# Patient Record
Sex: FEMALE | Race: WHITE | NOT HISPANIC OR LATINO | Employment: FULL TIME | URBAN - METROPOLITAN AREA
[De-identification: names, ages, dates, MRNs, and addresses within clinical notes are randomized per-mention and may not be internally consistent; named-entity substitution may affect disease eponyms.]

---

## 2017-03-20 PROBLEM — D25.9 UTERINE LEIOMYOMA: Status: ACTIVE | Noted: 2017-03-20

## 2017-03-23 ENCOUNTER — ANESTHESIA EVENT (OUTPATIENT)
Dept: PERIOP | Facility: HOSPITAL | Age: 37
End: 2017-03-23
Payer: COMMERCIAL

## 2017-03-24 ENCOUNTER — ANESTHESIA (OUTPATIENT)
Dept: PERIOP | Facility: HOSPITAL | Age: 37
End: 2017-03-24
Payer: COMMERCIAL

## 2017-03-24 ENCOUNTER — HOSPITAL ENCOUNTER (OUTPATIENT)
Facility: HOSPITAL | Age: 37
Setting detail: OUTPATIENT SURGERY
Discharge: HOME | End: 2017-03-24
Attending: OBSTETRICS & GYNECOLOGY | Admitting: OBSTETRICS & GYNECOLOGY
Payer: COMMERCIAL

## 2017-03-24 VITALS
BODY MASS INDEX: 21.66 KG/M2 | HEIGHT: 65 IN | HEART RATE: 97 BPM | OXYGEN SATURATION: 100 % | WEIGHT: 130 LBS | DIASTOLIC BLOOD PRESSURE: 63 MMHG | TEMPERATURE: 99.9 F | RESPIRATION RATE: 16 BRPM | SYSTOLIC BLOOD PRESSURE: 106 MMHG

## 2017-03-24 DIAGNOSIS — N92.1 EXCESSIVE AND FREQUENT MENSTRUATION WITH IRREGULAR CYCLE: ICD-10-CM

## 2017-03-24 DIAGNOSIS — D25.9 FIBROID UTERUS: ICD-10-CM

## 2017-03-24 LAB
ABO GROUP BLD: NORMAL
BASOPHILS # BLD AUTO: 0.02 THOUSANDS/ΜL (ref 0–0.1)
BASOPHILS NFR BLD AUTO: 1 % (ref 0–1)
BLD GP AB SCN SERPL QL: NEGATIVE
EOSINOPHIL # BLD AUTO: 0.06 THOUSAND/ΜL (ref 0–0.61)
EOSINOPHIL NFR BLD AUTO: 2 % (ref 0–6)
ERYTHROCYTE [DISTWIDTH] IN BLOOD BY AUTOMATED COUNT: 16.5 % (ref 11.6–15.1)
EXT PREGNANCY TEST URINE: NEGATIVE
GLUCOSE SERPL-MCNC: 128 MG/DL (ref 65–140)
HCT VFR BLD AUTO: 25.3 % (ref 34.8–46.1)
HGB BLD-MCNC: 7.3 G/DL (ref 11.5–15.4)
LYMPHOCYTES # BLD AUTO: 1.61 THOUSANDS/ΜL (ref 0.6–4.47)
LYMPHOCYTES NFR BLD AUTO: 42 % (ref 14–44)
MCH RBC QN AUTO: 21 PG (ref 26.8–34.3)
MCHC RBC AUTO-ENTMCNC: 28.9 G/DL (ref 31.4–37.4)
MCV RBC AUTO: 73 FL (ref 82–98)
MONOCYTES # BLD AUTO: 0.28 THOUSAND/ΜL (ref 0.17–1.22)
MONOCYTES NFR BLD AUTO: 7 % (ref 4–12)
NEUTROPHILS # BLD AUTO: 1.86 THOUSANDS/ΜL (ref 1.85–7.62)
NEUTS SEG NFR BLD AUTO: 48 % (ref 43–75)
NRBC BLD AUTO-RTO: 0 /100 WBCS
PLATELET # BLD AUTO: 211 THOUSANDS/UL (ref 149–390)
PMV BLD AUTO: 10.9 FL (ref 8.9–12.7)
RBC # BLD AUTO: 3.48 MILLION/UL (ref 3.81–5.12)
RH BLD: POSITIVE
WBC # BLD AUTO: 3.83 THOUSAND/UL (ref 4.31–10.16)

## 2017-03-24 PROCEDURE — 81025 URINE PREGNANCY TEST: CPT | Performed by: OBSTETRICS & GYNECOLOGY

## 2017-03-24 PROCEDURE — 82948 REAGENT STRIP/BLOOD GLUCOSE: CPT

## 2017-03-24 PROCEDURE — 86850 RBC ANTIBODY SCREEN: CPT | Performed by: OBSTETRICS & GYNECOLOGY

## 2017-03-24 PROCEDURE — 88307 TISSUE EXAM BY PATHOLOGIST: CPT | Performed by: OBSTETRICS & GYNECOLOGY

## 2017-03-24 PROCEDURE — 86901 BLOOD TYPING SEROLOGIC RH(D): CPT | Performed by: OBSTETRICS & GYNECOLOGY

## 2017-03-24 PROCEDURE — 86900 BLOOD TYPING SEROLOGIC ABO: CPT | Performed by: OBSTETRICS & GYNECOLOGY

## 2017-03-24 PROCEDURE — 85025 COMPLETE CBC W/AUTO DIFF WBC: CPT | Performed by: OBSTETRICS & GYNECOLOGY

## 2017-03-24 RX ORDER — FENTANYL CITRATE 50 UG/ML
INJECTION, SOLUTION INTRAMUSCULAR; INTRAVENOUS AS NEEDED
Status: DISCONTINUED | OUTPATIENT
Start: 2017-03-24 | End: 2017-03-24 | Stop reason: SURG

## 2017-03-24 RX ORDER — ONDANSETRON 2 MG/ML
INJECTION INTRAMUSCULAR; INTRAVENOUS AS NEEDED
Status: DISCONTINUED | OUTPATIENT
Start: 2017-03-24 | End: 2017-03-24 | Stop reason: SURG

## 2017-03-24 RX ORDER — FENTANYL CITRATE/PF 50 MCG/ML
25 SYRINGE (ML) INJECTION
Status: DISCONTINUED | OUTPATIENT
Start: 2017-03-24 | End: 2017-03-24 | Stop reason: HOSPADM

## 2017-03-24 RX ORDER — PROMETHAZINE HYDROCHLORIDE 25 MG/ML
12.5 INJECTION, SOLUTION INTRAMUSCULAR; INTRAVENOUS ONCE
Status: COMPLETED | OUTPATIENT
Start: 2017-03-24 | End: 2017-03-24

## 2017-03-24 RX ORDER — OXYCODONE HYDROCHLORIDE AND ACETAMINOPHEN 5; 325 MG/1; MG/1
TABLET ORAL
Qty: 28 TABLET | Refills: 0 | Status: SHIPPED | OUTPATIENT
Start: 2017-03-24 | End: 2017-10-29

## 2017-03-24 RX ORDER — IBUPROFEN 600 MG/1
600 TABLET ORAL EVERY 6 HOURS PRN
Status: DISCONTINUED | OUTPATIENT
Start: 2017-03-24 | End: 2017-03-24 | Stop reason: HOSPADM

## 2017-03-24 RX ORDER — PROPOFOL 10 MG/ML
INJECTION, EMULSION INTRAVENOUS AS NEEDED
Status: DISCONTINUED | OUTPATIENT
Start: 2017-03-24 | End: 2017-03-24 | Stop reason: SURG

## 2017-03-24 RX ORDER — ONDANSETRON 2 MG/ML
4 INJECTION INTRAMUSCULAR; INTRAVENOUS ONCE
Status: DISCONTINUED | OUTPATIENT
Start: 2017-03-24 | End: 2017-03-24 | Stop reason: HOSPADM

## 2017-03-24 RX ORDER — LIDOCAINE HYDROCHLORIDE 10 MG/ML
INJECTION, SOLUTION INFILTRATION; PERINEURAL AS NEEDED
Status: DISCONTINUED | OUTPATIENT
Start: 2017-03-24 | End: 2017-03-24 | Stop reason: SURG

## 2017-03-24 RX ORDER — KETOROLAC TROMETHAMINE 30 MG/ML
INJECTION, SOLUTION INTRAMUSCULAR; INTRAVENOUS AS NEEDED
Status: DISCONTINUED | OUTPATIENT
Start: 2017-03-24 | End: 2017-03-24 | Stop reason: SURG

## 2017-03-24 RX ORDER — OXYCODONE HYDROCHLORIDE AND ACETAMINOPHEN 5; 325 MG/1; MG/1
2 TABLET ORAL EVERY 4 HOURS PRN
Status: DISCONTINUED | OUTPATIENT
Start: 2017-03-24 | End: 2017-03-24 | Stop reason: HOSPADM

## 2017-03-24 RX ORDER — ROCURONIUM BROMIDE 10 MG/ML
INJECTION, SOLUTION INTRAVENOUS AS NEEDED
Status: DISCONTINUED | OUTPATIENT
Start: 2017-03-24 | End: 2017-03-24 | Stop reason: SURG

## 2017-03-24 RX ORDER — OXYCODONE HYDROCHLORIDE AND ACETAMINOPHEN 5; 325 MG/1; MG/1
1 TABLET ORAL EVERY 4 HOURS PRN
Status: DISCONTINUED | OUTPATIENT
Start: 2017-03-24 | End: 2017-03-24 | Stop reason: HOSPADM

## 2017-03-24 RX ORDER — SODIUM CHLORIDE, SODIUM LACTATE, POTASSIUM CHLORIDE, CALCIUM CHLORIDE 600; 310; 30; 20 MG/100ML; MG/100ML; MG/100ML; MG/100ML
100 INJECTION, SOLUTION INTRAVENOUS CONTINUOUS
Status: DISCONTINUED | OUTPATIENT
Start: 2017-03-24 | End: 2017-03-24 | Stop reason: HOSPADM

## 2017-03-24 RX ORDER — GLYCOPYRROLATE 0.2 MG/ML
INJECTION INTRAMUSCULAR; INTRAVENOUS AS NEEDED
Status: DISCONTINUED | OUTPATIENT
Start: 2017-03-24 | End: 2017-03-24 | Stop reason: SURG

## 2017-03-24 RX ORDER — METOCLOPRAMIDE HYDROCHLORIDE 5 MG/ML
INJECTION INTRAMUSCULAR; INTRAVENOUS AS NEEDED
Status: DISCONTINUED | OUTPATIENT
Start: 2017-03-24 | End: 2017-03-24 | Stop reason: SURG

## 2017-03-24 RX ORDER — SODIUM CHLORIDE, SODIUM LACTATE, POTASSIUM CHLORIDE, CALCIUM CHLORIDE 600; 310; 30; 20 MG/100ML; MG/100ML; MG/100ML; MG/100ML
125 INJECTION, SOLUTION INTRAVENOUS CONTINUOUS
Status: DISCONTINUED | OUTPATIENT
Start: 2017-03-24 | End: 2017-03-24 | Stop reason: HOSPADM

## 2017-03-24 RX ORDER — ONDANSETRON 2 MG/ML
4 INJECTION INTRAMUSCULAR; INTRAVENOUS EVERY 6 HOURS PRN
Status: DISCONTINUED | OUTPATIENT
Start: 2017-03-24 | End: 2017-03-24 | Stop reason: HOSPADM

## 2017-03-24 RX ORDER — MAGNESIUM HYDROXIDE 1200 MG/15ML
LIQUID ORAL AS NEEDED
Status: DISCONTINUED | OUTPATIENT
Start: 2017-03-24 | End: 2017-03-24 | Stop reason: HOSPADM

## 2017-03-24 RX ORDER — BUPIVACAINE HYDROCHLORIDE 2.5 MG/ML
INJECTION, SOLUTION INFILTRATION; PERINEURAL AS NEEDED
Status: DISCONTINUED | OUTPATIENT
Start: 2017-03-24 | End: 2017-03-24 | Stop reason: HOSPADM

## 2017-03-24 RX ADMIN — PROPOFOL 130 MG: 10 INJECTION, EMULSION INTRAVENOUS at 12:23

## 2017-03-24 RX ADMIN — METOCLOPRAMIDE HYDROCHLORIDE 10 MG: 5 INJECTION INTRAMUSCULAR; INTRAVENOUS at 14:50

## 2017-03-24 RX ADMIN — CEFAZOLIN SODIUM 1000 MG: 1 SOLUTION INTRAVENOUS at 12:34

## 2017-03-24 RX ADMIN — ROCURONIUM BROMIDE 40 MG: 10 INJECTION, SOLUTION INTRAVENOUS at 12:24

## 2017-03-24 RX ADMIN — DEXAMETHASONE SODIUM PHOSPHATE 10 MG: 10 INJECTION INTRAMUSCULAR; INTRAVENOUS at 12:47

## 2017-03-24 RX ADMIN — IBUPROFEN 600 MG: 600 TABLET, FILM COATED ORAL at 18:26

## 2017-03-24 RX ADMIN — PROMETHAZINE HYDROCHLORIDE 12.5 MG: 25 INJECTION, SOLUTION INTRAMUSCULAR; INTRAVENOUS at 15:26

## 2017-03-24 RX ADMIN — LIDOCAINE HYDROCHLORIDE 50 MG: 10 INJECTION, SOLUTION INFILTRATION; PERINEURAL at 12:22

## 2017-03-24 RX ADMIN — PROPOFOL 30 MG: 10 INJECTION, EMULSION INTRAVENOUS at 13:47

## 2017-03-24 RX ADMIN — FENTANYL CITRATE 50 MCG: 50 INJECTION, SOLUTION INTRAMUSCULAR; INTRAVENOUS at 13:47

## 2017-03-24 RX ADMIN — KETOROLAC TROMETHAMINE 30 MG: 30 INJECTION, SOLUTION INTRAMUSCULAR at 15:06

## 2017-03-24 RX ADMIN — SODIUM CHLORIDE, SODIUM LACTATE, POTASSIUM CHLORIDE, AND CALCIUM CHLORIDE: .6; .31; .03; .02 INJECTION, SOLUTION INTRAVENOUS at 14:24

## 2017-03-24 RX ADMIN — FENTANYL CITRATE 50 MCG: 50 INJECTION, SOLUTION INTRAMUSCULAR; INTRAVENOUS at 14:19

## 2017-03-24 RX ADMIN — ONDANSETRON 4 MG: 2 INJECTION INTRAMUSCULAR; INTRAVENOUS at 15:01

## 2017-03-24 RX ADMIN — NEOSTIGMINE METHYLSULFATE 2.5 MG: 1 INJECTION INTRAMUSCULAR; INTRAVENOUS; SUBCUTANEOUS at 15:08

## 2017-03-24 RX ADMIN — FENTANYL CITRATE 50 MCG: 50 INJECTION, SOLUTION INTRAMUSCULAR; INTRAVENOUS at 12:22

## 2017-03-24 RX ADMIN — OXYCODONE HYDROCHLORIDE AND ACETAMINOPHEN 1 TABLET: 5; 325 TABLET ORAL at 18:26

## 2017-03-24 RX ADMIN — SODIUM CHLORIDE, SODIUM LACTATE, POTASSIUM CHLORIDE, AND CALCIUM CHLORIDE 100 ML/HR: .6; .31; .03; .02 INJECTION, SOLUTION INTRAVENOUS at 11:58

## 2017-03-24 RX ADMIN — GLYCOPYRROLATE 0.5 MG: 0.2 INJECTION INTRAMUSCULAR; INTRAVENOUS at 15:08

## 2017-03-27 ENCOUNTER — ANESTHESIA (OUTPATIENT)
Dept: PERIOP | Facility: HOSPITAL | Age: 37
DRG: 694 | End: 2017-03-27
Payer: COMMERCIAL

## 2017-03-27 ENCOUNTER — APPOINTMENT (EMERGENCY)
Dept: RADIOLOGY | Facility: HOSPITAL | Age: 37
DRG: 694 | End: 2017-03-27
Payer: COMMERCIAL

## 2017-03-27 ENCOUNTER — APPOINTMENT (OUTPATIENT)
Dept: RADIOLOGY | Facility: HOSPITAL | Age: 37
DRG: 694 | End: 2017-03-27
Payer: COMMERCIAL

## 2017-03-27 ENCOUNTER — HOSPITAL ENCOUNTER (INPATIENT)
Facility: HOSPITAL | Age: 37
LOS: 1 days | Discharge: HOME | DRG: 694 | End: 2017-03-28
Attending: OBSTETRICS & GYNECOLOGY | Admitting: OBSTETRICS & GYNECOLOGY
Payer: COMMERCIAL

## 2017-03-27 ENCOUNTER — ANESTHESIA EVENT (OUTPATIENT)
Dept: PERIOP | Facility: HOSPITAL | Age: 37
DRG: 694 | End: 2017-03-27
Payer: COMMERCIAL

## 2017-03-27 DIAGNOSIS — N13.30 HYDRONEPHROSIS OF RIGHT KIDNEY: Primary | ICD-10-CM

## 2017-03-27 LAB
ABO GROUP BLD BPU: NORMAL
ABO GROUP BLD BPU: NORMAL
ABO GROUP BLD: NORMAL
ANION GAP SERPL CALCULATED.3IONS-SCNC: 8 MMOL/L (ref 4–13)
BASOPHILS # BLD AUTO: 0.02 THOUSANDS/ΜL (ref 0–0.1)
BASOPHILS NFR BLD AUTO: 0 % (ref 0–1)
BILIRUB UR QL STRIP: NEGATIVE
BLD GP AB SCN SERPL QL: NEGATIVE
BPU ID: NORMAL
BPU ID: NORMAL
BUN SERPL-MCNC: 9 MG/DL (ref 5–25)
CALCIUM SERPL-MCNC: 8.8 MG/DL (ref 8.3–10.1)
CHLORIDE SERPL-SCNC: 107 MMOL/L (ref 100–108)
CLARITY UR: CLEAR
CO2 SERPL-SCNC: 26 MMOL/L (ref 21–32)
COLOR UR: YELLOW
CREAT SERPL-MCNC: 1.36 MG/DL (ref 0.6–1.3)
EOSINOPHIL # BLD AUTO: 0.08 THOUSAND/ΜL (ref 0–0.61)
EOSINOPHIL NFR BLD AUTO: 1 % (ref 0–6)
ERYTHROCYTE [DISTWIDTH] IN BLOOD BY AUTOMATED COUNT: 16.8 % (ref 11.6–15.1)
GFR SERPL CREATININE-BSD FRML MDRD: 43.5 ML/MIN/1.73SQ M
GLUCOSE SERPL-MCNC: 90 MG/DL (ref 65–140)
GLUCOSE UR STRIP-MCNC: NEGATIVE MG/DL
HCT VFR BLD AUTO: 21.6 % (ref 34.8–46.1)
HGB BLD-MCNC: 6.2 G/DL (ref 11.5–15.4)
HGB UR QL STRIP.AUTO: NEGATIVE
KETONES UR STRIP-MCNC: NEGATIVE MG/DL
LEUKOCYTE ESTERASE UR QL STRIP: NEGATIVE
LYMPHOCYTES # BLD AUTO: 1.82 THOUSANDS/ΜL (ref 0.6–4.47)
LYMPHOCYTES NFR BLD AUTO: 22 % (ref 14–44)
MCH RBC QN AUTO: 20.3 PG (ref 26.8–34.3)
MCHC RBC AUTO-ENTMCNC: 28.7 G/DL (ref 31.4–37.4)
MCV RBC AUTO: 71 FL (ref 82–98)
MONOCYTES # BLD AUTO: 0.63 THOUSAND/ΜL (ref 0.17–1.22)
MONOCYTES NFR BLD AUTO: 8 % (ref 4–12)
NEUTROPHILS # BLD AUTO: 5.63 THOUSANDS/ΜL (ref 1.85–7.62)
NEUTS SEG NFR BLD AUTO: 69 % (ref 43–75)
NITRITE UR QL STRIP: NEGATIVE
PH UR STRIP.AUTO: 7.5 [PH] (ref 4.5–8)
PLATELET # BLD AUTO: 194 THOUSANDS/UL (ref 149–390)
PMV BLD AUTO: 11 FL (ref 8.9–12.7)
POTASSIUM SERPL-SCNC: 3.9 MMOL/L (ref 3.5–5.3)
PROT UR STRIP-MCNC: NEGATIVE MG/DL
RBC # BLD AUTO: 3.05 MILLION/UL (ref 3.81–5.12)
RH BLD: POSITIVE
SODIUM SERPL-SCNC: 141 MMOL/L (ref 136–145)
SP GR UR STRIP.AUTO: 1 (ref 1–1.03)
UNIT DISPENSE STATUS: NORMAL
UNIT DISPENSE STATUS: NORMAL
UNIT PRODUCT CODE: NORMAL
UNIT PRODUCT CODE: NORMAL
UNIT RH: NORMAL
UNIT RH: NORMAL
UROBILINOGEN UR QL STRIP.AUTO: 0.2 E.U./DL
WBC # BLD AUTO: 8.22 THOUSAND/UL (ref 4.31–10.16)

## 2017-03-27 PROCEDURE — P9016 RBC LEUKOCYTES REDUCED: HCPCS

## 2017-03-27 PROCEDURE — P9021 RED BLOOD CELLS UNIT: HCPCS

## 2017-03-27 PROCEDURE — 74176 CT ABD & PELVIS W/O CONTRAST: CPT

## 2017-03-27 PROCEDURE — 86850 RBC ANTIBODY SCREEN: CPT | Performed by: OBSTETRICS & GYNECOLOGY

## 2017-03-27 PROCEDURE — 85025 COMPLETE CBC W/AUTO DIFF WBC: CPT | Performed by: OBSTETRICS & GYNECOLOGY

## 2017-03-27 PROCEDURE — C1769 GUIDE WIRE: HCPCS | Performed by: UROLOGY

## 2017-03-27 PROCEDURE — 86901 BLOOD TYPING SEROLOGIC RH(D): CPT | Performed by: OBSTETRICS & GYNECOLOGY

## 2017-03-27 PROCEDURE — 86923 COMPATIBILITY TEST ELECTRIC: CPT

## 2017-03-27 PROCEDURE — 99284 EMERGENCY DEPT VISIT MOD MDM: CPT

## 2017-03-27 PROCEDURE — 74420 UROGRAPHY RTRGR +-KUB: CPT

## 2017-03-27 PROCEDURE — 36430 TRANSFUSION BLD/BLD COMPNT: CPT

## 2017-03-27 PROCEDURE — 36415 COLL VENOUS BLD VENIPUNCTURE: CPT | Performed by: OBSTETRICS & GYNECOLOGY

## 2017-03-27 PROCEDURE — 81003 URINALYSIS AUTO W/O SCOPE: CPT | Performed by: OBSTETRICS & GYNECOLOGY

## 2017-03-27 PROCEDURE — 86900 BLOOD TYPING SEROLOGIC ABO: CPT | Performed by: OBSTETRICS & GYNECOLOGY

## 2017-03-27 PROCEDURE — 0T768DZ DILATION OF RIGHT URETER WITH INTRALUMINAL DEVICE, VIA NATURAL OR ARTIFICIAL OPENING ENDOSCOPIC: ICD-10-PCS | Performed by: UROLOGY

## 2017-03-27 PROCEDURE — 80048 BASIC METABOLIC PNL TOTAL CA: CPT | Performed by: OBSTETRICS & GYNECOLOGY

## 2017-03-27 PROCEDURE — C2617 STENT, NON-COR, TEM W/O DEL: HCPCS | Performed by: UROLOGY

## 2017-03-27 PROCEDURE — BT140ZZ FLUOROSCOPY OF KIDNEYS, URETERS AND BLADDER USING HIGH OSMOLAR CONTRAST: ICD-10-PCS | Performed by: UROLOGY

## 2017-03-27 PROCEDURE — 30233N1 TRANSFUSION OF NONAUTOLOGOUS RED BLOOD CELLS INTO PERIPHERAL VEIN, PERCUTANEOUS APPROACH: ICD-10-PCS | Performed by: OBSTETRICS & GYNECOLOGY

## 2017-03-27 DEVICE — STENT URETERAL 6 FR 26CM INLAY OPTIMA: Type: IMPLANTABLE DEVICE | Site: URETER | Status: FUNCTIONAL

## 2017-03-27 RX ORDER — DIPHENHYDRAMINE HYDROCHLORIDE 50 MG/ML
25 INJECTION INTRAMUSCULAR; INTRAVENOUS ONCE
Status: COMPLETED | OUTPATIENT
Start: 2017-03-27 | End: 2017-03-27

## 2017-03-27 RX ORDER — SODIUM CHLORIDE, SODIUM LACTATE, POTASSIUM CHLORIDE, CALCIUM CHLORIDE 600; 310; 30; 20 MG/100ML; MG/100ML; MG/100ML; MG/100ML
100 INJECTION, SOLUTION INTRAVENOUS CONTINUOUS
Status: DISCONTINUED | OUTPATIENT
Start: 2017-03-27 | End: 2017-03-28

## 2017-03-27 RX ORDER — SODIUM CHLORIDE 9 MG/ML
125 INJECTION, SOLUTION INTRAVENOUS CONTINUOUS
Status: DISCONTINUED | OUTPATIENT
Start: 2017-03-27 | End: 2017-03-27

## 2017-03-27 RX ORDER — DOCUSATE SODIUM 100 MG/1
100 CAPSULE, LIQUID FILLED ORAL 2 TIMES DAILY
Status: DISCONTINUED | OUTPATIENT
Start: 2017-03-27 | End: 2017-03-27 | Stop reason: SDUPTHER

## 2017-03-27 RX ORDER — OXYCODONE HYDROCHLORIDE AND ACETAMINOPHEN 5; 325 MG/1; MG/1
1 TABLET ORAL EVERY 4 HOURS PRN
Status: DISCONTINUED | OUTPATIENT
Start: 2017-03-27 | End: 2017-03-28

## 2017-03-27 RX ORDER — DOCUSATE SODIUM 100 MG/1
100 CAPSULE, LIQUID FILLED ORAL 2 TIMES DAILY
Status: DISCONTINUED | OUTPATIENT
Start: 2017-03-28 | End: 2017-03-28 | Stop reason: HOSPADM

## 2017-03-27 RX ORDER — PROPOFOL 10 MG/ML
INJECTION, EMULSION INTRAVENOUS AS NEEDED
Status: DISCONTINUED | OUTPATIENT
Start: 2017-03-27 | End: 2017-03-27 | Stop reason: SURG

## 2017-03-27 RX ORDER — ROCURONIUM BROMIDE 10 MG/ML
INJECTION, SOLUTION INTRAVENOUS AS NEEDED
Status: DISCONTINUED | OUTPATIENT
Start: 2017-03-27 | End: 2017-03-27 | Stop reason: SURG

## 2017-03-27 RX ORDER — SODIUM CHLORIDE, SODIUM LACTATE, POTASSIUM CHLORIDE, CALCIUM CHLORIDE 600; 310; 30; 20 MG/100ML; MG/100ML; MG/100ML; MG/100ML
INJECTION, SOLUTION INTRAVENOUS CONTINUOUS PRN
Status: DISCONTINUED | OUTPATIENT
Start: 2017-03-27 | End: 2017-03-27 | Stop reason: SURG

## 2017-03-27 RX ORDER — GLYCOPYRROLATE 0.2 MG/ML
INJECTION INTRAMUSCULAR; INTRAVENOUS AS NEEDED
Status: DISCONTINUED | OUTPATIENT
Start: 2017-03-27 | End: 2017-03-27 | Stop reason: SURG

## 2017-03-27 RX ORDER — SUCCINYLCHOLINE CHLORIDE 20 MG/ML
INJECTION INTRAMUSCULAR; INTRAVENOUS AS NEEDED
Status: DISCONTINUED | OUTPATIENT
Start: 2017-03-27 | End: 2017-03-27 | Stop reason: SURG

## 2017-03-27 RX ORDER — SODIUM CHLORIDE 9 MG/ML
INJECTION, SOLUTION INTRAVENOUS CONTINUOUS PRN
Status: DISCONTINUED | OUTPATIENT
Start: 2017-03-27 | End: 2017-03-27 | Stop reason: SURG

## 2017-03-27 RX ORDER — FENTANYL CITRATE 50 UG/ML
INJECTION, SOLUTION INTRAMUSCULAR; INTRAVENOUS AS NEEDED
Status: DISCONTINUED | OUTPATIENT
Start: 2017-03-27 | End: 2017-03-27

## 2017-03-27 RX ORDER — FERROUS SULFATE 325(65) MG
325 TABLET ORAL
Status: DISCONTINUED | OUTPATIENT
Start: 2017-03-28 | End: 2017-03-28 | Stop reason: HOSPADM

## 2017-03-27 RX ORDER — SODIUM CHLORIDE, SODIUM LACTATE, POTASSIUM CHLORIDE, CALCIUM CHLORIDE 600; 310; 30; 20 MG/100ML; MG/100ML; MG/100ML; MG/100ML
50 INJECTION, SOLUTION INTRAVENOUS CONTINUOUS
Status: DISCONTINUED | OUTPATIENT
Start: 2017-03-27 | End: 2017-03-27

## 2017-03-27 RX ORDER — MAGNESIUM HYDROXIDE 1200 MG/15ML
LIQUID ORAL AS NEEDED
Status: DISCONTINUED | OUTPATIENT
Start: 2017-03-27 | End: 2017-03-27 | Stop reason: HOSPADM

## 2017-03-27 RX ORDER — ONDANSETRON 2 MG/ML
INJECTION INTRAMUSCULAR; INTRAVENOUS AS NEEDED
Status: DISCONTINUED | OUTPATIENT
Start: 2017-03-27 | End: 2017-03-27 | Stop reason: SURG

## 2017-03-27 RX ORDER — POLYETHYLENE GLYCOL 3350 17 G/17G
17 POWDER, FOR SOLUTION ORAL DAILY
Status: DISCONTINUED | OUTPATIENT
Start: 2017-03-28 | End: 2017-03-28 | Stop reason: HOSPADM

## 2017-03-27 RX ORDER — FENTANYL CITRATE/PF 50 MCG/ML
50 SYRINGE (ML) INJECTION
Status: DISCONTINUED | OUTPATIENT
Start: 2017-03-27 | End: 2017-03-27 | Stop reason: HOSPADM

## 2017-03-27 RX ORDER — ONDANSETRON 2 MG/ML
4 INJECTION INTRAMUSCULAR; INTRAVENOUS EVERY 4 HOURS PRN
Status: DISCONTINUED | OUTPATIENT
Start: 2017-03-27 | End: 2017-03-28 | Stop reason: HOSPADM

## 2017-03-27 RX ORDER — MIDAZOLAM HYDROCHLORIDE 1 MG/ML
INJECTION INTRAMUSCULAR; INTRAVENOUS AS NEEDED
Status: DISCONTINUED | OUTPATIENT
Start: 2017-03-27 | End: 2017-03-27

## 2017-03-27 RX ORDER — METOCLOPRAMIDE HYDROCHLORIDE 5 MG/ML
INJECTION INTRAMUSCULAR; INTRAVENOUS AS NEEDED
Status: DISCONTINUED | OUTPATIENT
Start: 2017-03-27 | End: 2017-03-27 | Stop reason: SURG

## 2017-03-27 RX ADMIN — NEOSTIGMINE METHYLSULFATE 1 MG: 1 INJECTION INTRAMUSCULAR; INTRAVENOUS; SUBCUTANEOUS at 19:04

## 2017-03-27 RX ADMIN — SUCCINYLCHOLINE CHLORIDE 100 MG: 20 INJECTION, SOLUTION INTRAMUSCULAR; INTRAVENOUS at 18:32

## 2017-03-27 RX ADMIN — SODIUM CHLORIDE: 0.9 INJECTION, SOLUTION INTRAVENOUS at 18:36

## 2017-03-27 RX ADMIN — PROPOFOL 150 MG: 10 INJECTION, EMULSION INTRAVENOUS at 18:32

## 2017-03-27 RX ADMIN — DIPHENHYDRAMINE HYDROCHLORIDE 25 MG: 50 INJECTION, SOLUTION INTRAMUSCULAR; INTRAVENOUS at 16:59

## 2017-03-27 RX ADMIN — ONDANSETRON 4 MG: 2 INJECTION INTRAMUSCULAR; INTRAVENOUS at 16:59

## 2017-03-27 RX ADMIN — ROCURONIUM BROMIDE 10 MG: 10 INJECTION, SOLUTION INTRAVENOUS at 18:45

## 2017-03-27 RX ADMIN — METOCLOPRAMIDE HYDROCHLORIDE 10 MG: 5 INJECTION INTRAMUSCULAR; INTRAVENOUS at 19:15

## 2017-03-27 RX ADMIN — HYDROMORPHONE HYDROCHLORIDE 0.5 MG: 1 INJECTION, SOLUTION INTRAMUSCULAR; INTRAVENOUS; SUBCUTANEOUS at 16:59

## 2017-03-27 RX ADMIN — ONDANSETRON 4 MG: 2 INJECTION INTRAMUSCULAR; INTRAVENOUS at 19:14

## 2017-03-27 RX ADMIN — ONDANSETRON 4 MG: 2 INJECTION INTRAMUSCULAR; INTRAVENOUS at 18:47

## 2017-03-27 RX ADMIN — PROPOFOL 50 MG: 10 INJECTION, EMULSION INTRAVENOUS at 18:40

## 2017-03-27 RX ADMIN — GLYCOPYRROLATE 0.2 MG: 0.2 INJECTION INTRAMUSCULAR; INTRAVENOUS at 19:04

## 2017-03-27 RX ADMIN — SODIUM CHLORIDE, SODIUM LACTATE, POTASSIUM CHLORIDE, AND CALCIUM CHLORIDE: .6; .31; .03; .02 INJECTION, SOLUTION INTRAVENOUS at 18:19

## 2017-03-27 RX ADMIN — DEXAMETHASONE SODIUM PHOSPHATE 10 MG: 10 INJECTION INTRAMUSCULAR; INTRAVENOUS at 18:47

## 2017-03-27 RX ADMIN — CEFAZOLIN SODIUM 1000 MG: 2 SOLUTION INTRAVENOUS at 18:38

## 2017-03-28 VITALS
TEMPERATURE: 97.8 F | DIASTOLIC BLOOD PRESSURE: 65 MMHG | SYSTOLIC BLOOD PRESSURE: 115 MMHG | HEART RATE: 57 BPM | OXYGEN SATURATION: 98 % | RESPIRATION RATE: 20 BRPM

## 2017-03-28 PROBLEM — N13.30 HYDRONEPHROSIS OF RIGHT KIDNEY: Status: ACTIVE | Noted: 2017-03-28

## 2017-03-28 LAB
ANION GAP SERPL CALCULATED.3IONS-SCNC: 6 MMOL/L (ref 4–13)
BUN SERPL-MCNC: 9 MG/DL (ref 5–25)
CALCIUM SERPL-MCNC: 8.6 MG/DL (ref 8.3–10.1)
CHLORIDE SERPL-SCNC: 107 MMOL/L (ref 100–108)
CO2 SERPL-SCNC: 27 MMOL/L (ref 21–32)
CREAT SERPL-MCNC: 0.71 MG/DL (ref 0.6–1.3)
ERYTHROCYTE [DISTWIDTH] IN BLOOD BY AUTOMATED COUNT: 17.7 % (ref 11.6–15.1)
GFR SERPL CREATININE-BSD FRML MDRD: >60 ML/MIN/1.73SQ M
GLUCOSE SERPL-MCNC: 139 MG/DL (ref 65–140)
HCT VFR BLD AUTO: 26.8 % (ref 34.8–46.1)
HGB BLD-MCNC: 8.2 G/DL (ref 11.5–15.4)
MCH RBC QN AUTO: 23.1 PG (ref 26.8–34.3)
MCHC RBC AUTO-ENTMCNC: 30.6 G/DL (ref 31.4–37.4)
MCV RBC AUTO: 76 FL (ref 82–98)
PLATELET # BLD AUTO: 125 THOUSANDS/UL (ref 149–390)
PMV BLD AUTO: 10.6 FL (ref 8.9–12.7)
POTASSIUM SERPL-SCNC: 4.1 MMOL/L (ref 3.5–5.3)
RBC # BLD AUTO: 3.55 MILLION/UL (ref 3.81–5.12)
SODIUM SERPL-SCNC: 140 MMOL/L (ref 136–145)
WBC # BLD AUTO: 9.22 THOUSAND/UL (ref 4.31–10.16)

## 2017-03-28 PROCEDURE — 85027 COMPLETE CBC AUTOMATED: CPT | Performed by: OBSTETRICS & GYNECOLOGY

## 2017-03-28 PROCEDURE — 80048 BASIC METABOLIC PNL TOTAL CA: CPT | Performed by: UROLOGY

## 2017-03-28 RX ORDER — POLYETHYLENE GLYCOL 3350 17 G/17G
17 POWDER, FOR SOLUTION ORAL DAILY
Qty: 510 G | Refills: 0
Start: 2017-03-28 | End: 2017-10-29

## 2017-03-28 RX ORDER — OXYCODONE HYDROCHLORIDE AND ACETAMINOPHEN 5; 325 MG/1; MG/1
2 TABLET ORAL EVERY 4 HOURS PRN
Status: DISCONTINUED | OUTPATIENT
Start: 2017-03-28 | End: 2017-03-28 | Stop reason: HOSPADM

## 2017-03-28 RX ORDER — OXYCODONE HYDROCHLORIDE AND ACETAMINOPHEN 5; 325 MG/1; MG/1
1 TABLET ORAL EVERY 4 HOURS PRN
Status: DISCONTINUED | OUTPATIENT
Start: 2017-03-28 | End: 2017-03-28 | Stop reason: HOSPADM

## 2017-03-28 RX ORDER — IBUPROFEN 600 MG/1
600 TABLET ORAL EVERY 4 HOURS PRN
Status: DISCONTINUED | OUTPATIENT
Start: 2017-03-28 | End: 2017-03-28

## 2017-03-28 RX ORDER — DOCUSATE SODIUM 100 MG/1
100 CAPSULE, LIQUID FILLED ORAL 2 TIMES DAILY
Qty: 60 CAPSULE | Refills: 0
Start: 2017-03-28 | End: 2017-10-29

## 2017-03-28 RX ADMIN — ENOXAPARIN SODIUM 30 MG: 30 INJECTION SUBCUTANEOUS at 08:03

## 2017-03-28 RX ADMIN — DOCUSATE SODIUM 100 MG: 100 CAPSULE, LIQUID FILLED ORAL at 08:03

## 2017-03-28 RX ADMIN — SODIUM CHLORIDE, SODIUM LACTATE, POTASSIUM CHLORIDE, AND CALCIUM CHLORIDE 100 ML/HR: .6; .31; .03; .02 INJECTION, SOLUTION INTRAVENOUS at 01:18

## 2017-03-28 RX ADMIN — POLYETHYLENE GLYCOL 3350 17 G: 17 POWDER, FOR SOLUTION ORAL at 08:03

## 2017-03-28 RX ADMIN — FERROUS SULFATE TAB 325 MG (65 MG ELEMENTAL FE) 325 MG: 325 (65 FE) TAB at 08:03

## 2017-03-29 LAB
ABO GROUP BLD BPU: NORMAL
ABO GROUP BLD BPU: NORMAL
BPU ID: NORMAL
BPU ID: NORMAL
UNIT DISPENSE STATUS: NORMAL
UNIT DISPENSE STATUS: NORMAL
UNIT PRODUCT CODE: NORMAL
UNIT PRODUCT CODE: NORMAL
UNIT RH: NORMAL
UNIT RH: NORMAL

## 2017-04-04 ENCOUNTER — TRANSCRIBE ORDERS (OUTPATIENT)
Dept: ADMINISTRATIVE | Facility: HOSPITAL | Age: 37
End: 2017-04-04

## 2017-04-04 DIAGNOSIS — N13.30 HYDRONEPHROSIS, UNSPECIFIED HYDRONEPHROSIS TYPE: Primary | ICD-10-CM

## 2017-04-12 ENCOUNTER — HOSPITAL ENCOUNTER (OUTPATIENT)
Dept: RADIOLOGY | Age: 37
Discharge: HOME | End: 2017-04-12
Payer: COMMERCIAL

## 2017-04-12 DIAGNOSIS — N13.30 HYDRONEPHROSIS, UNSPECIFIED HYDRONEPHROSIS TYPE: ICD-10-CM

## 2017-04-12 PROCEDURE — 76770 US EXAM ABDO BACK WALL COMP: CPT

## 2017-04-28 ENCOUNTER — LAB CONVERSION - ENCOUNTER (OUTPATIENT)
Dept: OTHER | Facility: OTHER | Age: 37
End: 2017-04-28

## 2017-04-28 ENCOUNTER — LAB REQUISITION (OUTPATIENT)
Dept: LAB | Facility: HOSPITAL | Age: 37
End: 2017-04-28
Payer: COMMERCIAL

## 2017-04-28 ENCOUNTER — TRANSCRIBE ORDERS (OUTPATIENT)
Dept: ADMINISTRATIVE | Facility: HOSPITAL | Age: 37
End: 2017-04-28

## 2017-04-28 DIAGNOSIS — R31.29 OTHER MICROSCOPIC HEMATURIA: ICD-10-CM

## 2017-04-28 DIAGNOSIS — N13.30 HYDRONEPHROSIS, UNSPECIFIED HYDRONEPHROSIS TYPE: Primary | ICD-10-CM

## 2017-04-28 PROCEDURE — 87086 URINE CULTURE/COLONY COUNT: CPT | Performed by: UROLOGY

## 2017-04-29 LAB — BACTERIA UR CULT: NORMAL

## 2017-05-22 ENCOUNTER — ANESTHESIA (EMERGENCY)
Dept: PERIOP | Facility: HOSPITAL | Age: 37
End: 2017-05-22

## 2017-05-22 ENCOUNTER — HOSPITAL ENCOUNTER (OUTPATIENT)
Facility: HOSPITAL | Age: 37
Setting detail: OUTPATIENT SURGERY
Discharge: HOME | End: 2017-05-22
Attending: OBSTETRICS & GYNECOLOGY | Admitting: OBSTETRICS & GYNECOLOGY

## 2017-05-22 ENCOUNTER — ANESTHESIA EVENT (EMERGENCY)
Dept: PERIOP | Facility: HOSPITAL | Age: 37
End: 2017-05-22

## 2017-05-22 VITALS
SYSTOLIC BLOOD PRESSURE: 100 MMHG | DIASTOLIC BLOOD PRESSURE: 55 MMHG | WEIGHT: 130 LBS | TEMPERATURE: 97.9 F | OXYGEN SATURATION: 98 % | BODY MASS INDEX: 19.26 KG/M2 | RESPIRATION RATE: 16 BRPM | HEART RATE: 74 BPM | HEIGHT: 69 IN

## 2017-05-22 PROBLEM — D25.9 UTERINE LEIOMYOMA: Status: RESOLVED | Noted: 2017-03-20 | Resolved: 2017-05-22

## 2017-05-22 PROBLEM — N13.30 HYDRONEPHROSIS OF RIGHT KIDNEY: Status: RESOLVED | Noted: 2017-03-28 | Resolved: 2017-05-22

## 2017-05-22 PROBLEM — S39.93XA VAGINAL INJURY: Status: ACTIVE | Noted: 2017-05-22

## 2017-05-22 PROCEDURE — 99284 EMERGENCY DEPT VISIT MOD MDM: CPT

## 2017-05-22 RX ORDER — LIDOCAINE HYDROCHLORIDE 10 MG/ML
INJECTION, SOLUTION INFILTRATION; PERINEURAL AS NEEDED
Status: DISCONTINUED | OUTPATIENT
Start: 2017-05-22 | End: 2017-05-22 | Stop reason: SURG

## 2017-05-22 RX ORDER — METOCLOPRAMIDE HYDROCHLORIDE 5 MG/ML
10 INJECTION INTRAMUSCULAR; INTRAVENOUS ONCE AS NEEDED
Status: COMPLETED | OUTPATIENT
Start: 2017-05-22 | End: 2017-05-22

## 2017-05-22 RX ORDER — CLINDAMYCIN PHOSPHATE 150 MG/ML
INJECTION, SOLUTION INTRAVENOUS AS NEEDED
Status: DISCONTINUED | OUTPATIENT
Start: 2017-05-22 | End: 2017-05-22 | Stop reason: SURG

## 2017-05-22 RX ORDER — MIDAZOLAM HYDROCHLORIDE 1 MG/ML
INJECTION INTRAMUSCULAR; INTRAVENOUS AS NEEDED
Status: DISCONTINUED | OUTPATIENT
Start: 2017-05-22 | End: 2017-05-22 | Stop reason: SURG

## 2017-05-22 RX ORDER — SODIUM CHLORIDE, SODIUM LACTATE, POTASSIUM CHLORIDE, CALCIUM CHLORIDE 600; 310; 30; 20 MG/100ML; MG/100ML; MG/100ML; MG/100ML
INJECTION, SOLUTION INTRAVENOUS CONTINUOUS PRN
Status: DISCONTINUED | OUTPATIENT
Start: 2017-05-22 | End: 2017-05-22 | Stop reason: SURG

## 2017-05-22 RX ORDER — AMPICILLIN 2 G/1
INJECTION, POWDER, FOR SOLUTION INTRAVENOUS AS NEEDED
Status: DISCONTINUED | OUTPATIENT
Start: 2017-05-22 | End: 2017-05-22 | Stop reason: SURG

## 2017-05-22 RX ORDER — PROPOFOL 10 MG/ML
INJECTION, EMULSION INTRAVENOUS AS NEEDED
Status: DISCONTINUED | OUTPATIENT
Start: 2017-05-22 | End: 2017-05-22 | Stop reason: SURG

## 2017-05-22 RX ORDER — ONDANSETRON 2 MG/ML
4 INJECTION INTRAMUSCULAR; INTRAVENOUS ONCE AS NEEDED
Status: COMPLETED | OUTPATIENT
Start: 2017-05-22 | End: 2017-05-22

## 2017-05-22 RX ORDER — FENTANYL CITRATE 50 UG/ML
INJECTION, SOLUTION INTRAMUSCULAR; INTRAVENOUS AS NEEDED
Status: DISCONTINUED | OUTPATIENT
Start: 2017-05-22 | End: 2017-05-22 | Stop reason: SURG

## 2017-05-22 RX ORDER — ONDANSETRON 2 MG/ML
INJECTION INTRAMUSCULAR; INTRAVENOUS AS NEEDED
Status: DISCONTINUED | OUTPATIENT
Start: 2017-05-22 | End: 2017-05-22 | Stop reason: SURG

## 2017-05-22 RX ORDER — MEPERIDINE HYDROCHLORIDE 25 MG/ML
12.5 INJECTION INTRAMUSCULAR; INTRAVENOUS; SUBCUTANEOUS AS NEEDED
Status: DISCONTINUED | OUTPATIENT
Start: 2017-05-22 | End: 2017-05-22 | Stop reason: HOSPADM

## 2017-05-22 RX ORDER — FENTANYL CITRATE/PF 50 MCG/ML
25 SYRINGE (ML) INJECTION
Status: DISCONTINUED | OUTPATIENT
Start: 2017-05-22 | End: 2017-05-22 | Stop reason: HOSPADM

## 2017-05-22 RX ORDER — GENTAMICIN SULFATE 40 MG/ML
INJECTION, SOLUTION INTRAMUSCULAR; INTRAVENOUS AS NEEDED
Status: DISCONTINUED | OUTPATIENT
Start: 2017-05-22 | End: 2017-05-22 | Stop reason: SURG

## 2017-05-22 RX ORDER — SUCCINYLCHOLINE CHLORIDE 20 MG/ML
INJECTION INTRAMUSCULAR; INTRAVENOUS AS NEEDED
Status: DISCONTINUED | OUTPATIENT
Start: 2017-05-22 | End: 2017-05-22 | Stop reason: SURG

## 2017-05-22 RX ADMIN — LIDOCAINE HYDROCHLORIDE 40 MG: 10 INJECTION, SOLUTION INFILTRATION; PERINEURAL at 19:53

## 2017-05-22 RX ADMIN — FENTANYL CITRATE 50 MCG: 50 INJECTION, SOLUTION INTRAMUSCULAR; INTRAVENOUS at 19:37

## 2017-05-22 RX ADMIN — CLINDAMYCIN PHOSPHATE 900 MG: 150 INJECTION, SOLUTION INTRAMUSCULAR; INTRAVENOUS at 19:41

## 2017-05-22 RX ADMIN — AMPICILLIN SODIUM 2000 MG: 2 INJECTION, POWDER, FOR SOLUTION INTRAMUSCULAR; INTRAVENOUS at 19:41

## 2017-05-22 RX ADMIN — FENTANYL CITRATE 50 MCG: 50 INJECTION, SOLUTION INTRAMUSCULAR; INTRAVENOUS at 19:53

## 2017-05-22 RX ADMIN — METOCLOPRAMIDE 10 MG: 5 INJECTION, SOLUTION INTRAMUSCULAR; INTRAVENOUS at 20:53

## 2017-05-22 RX ADMIN — SODIUM CHLORIDE, SODIUM LACTATE, POTASSIUM CHLORIDE, AND CALCIUM CHLORIDE: .6; .31; .03; .02 INJECTION, SOLUTION INTRAVENOUS at 19:20

## 2017-05-22 RX ADMIN — ONDANSETRON 4 MG: 2 INJECTION INTRAMUSCULAR; INTRAVENOUS at 20:33

## 2017-05-22 RX ADMIN — LIDOCAINE HYDROCHLORIDE 60 MG: 10 INJECTION, SOLUTION INFILTRATION; PERINEURAL at 19:37

## 2017-05-22 RX ADMIN — ONDANSETRON 4 MG: 2 INJECTION INTRAMUSCULAR; INTRAVENOUS at 19:58

## 2017-05-22 RX ADMIN — DEXAMETHASONE SODIUM PHOSPHATE 10 MG: 10 INJECTION INTRAMUSCULAR; INTRAVENOUS at 19:58

## 2017-05-22 RX ADMIN — SUCCINYLCHOLINE CHLORIDE 100 MG: 20 INJECTION, SOLUTION INTRAMUSCULAR; INTRAVENOUS at 19:37

## 2017-05-22 RX ADMIN — PROPOFOL 200 MG: 10 INJECTION, EMULSION INTRAVENOUS at 19:37

## 2017-05-22 RX ADMIN — MIDAZOLAM HYDROCHLORIDE 2 MG: 1 INJECTION, SOLUTION INTRAMUSCULAR; INTRAVENOUS at 19:29

## 2017-05-22 RX ADMIN — PROPOFOL 100 MG: 10 INJECTION, EMULSION INTRAVENOUS at 19:53

## 2017-05-22 RX ADMIN — GENTAMICIN SULFATE 300 MG: 40 INJECTION, SOLUTION INTRAMUSCULAR; INTRAVENOUS at 19:41

## 2017-10-29 ENCOUNTER — HOSPITAL ENCOUNTER (EMERGENCY)
Facility: HOSPITAL | Age: 37
Discharge: HOME | End: 2017-10-29
Attending: EMERGENCY MEDICINE | Admitting: EMERGENCY MEDICINE
Payer: COMMERCIAL

## 2017-10-29 ENCOUNTER — APPOINTMENT (EMERGENCY)
Dept: RADIOLOGY | Facility: HOSPITAL | Age: 37
End: 2017-10-29
Payer: COMMERCIAL

## 2017-10-29 VITALS
DIASTOLIC BLOOD PRESSURE: 73 MMHG | RESPIRATION RATE: 18 BRPM | OXYGEN SATURATION: 95 % | BODY MASS INDEX: 19.2 KG/M2 | TEMPERATURE: 98.6 F | HEART RATE: 84 BPM | WEIGHT: 130 LBS | SYSTOLIC BLOOD PRESSURE: 131 MMHG

## 2017-10-29 DIAGNOSIS — R10.9 RT FLANK PAIN: Primary | ICD-10-CM

## 2017-10-29 LAB
ANION GAP SERPL CALCULATED.3IONS-SCNC: 6 MMOL/L (ref 4–13)
BACTERIA UR QL AUTO: ABNORMAL /HPF
BASOPHILS # BLD AUTO: 0.03 THOUSANDS/ΜL (ref 0–0.1)
BASOPHILS NFR BLD AUTO: 0 % (ref 0–1)
BILIRUB UR QL STRIP: NEGATIVE
BUN SERPL-MCNC: 12 MG/DL (ref 5–25)
CALCIUM SERPL-MCNC: 8.6 MG/DL (ref 8.3–10.1)
CHLORIDE SERPL-SCNC: 107 MMOL/L (ref 100–108)
CLARITY UR: CLEAR
CO2 SERPL-SCNC: 27 MMOL/L (ref 21–32)
COLOR UR: YELLOW
CREAT SERPL-MCNC: 0.75 MG/DL (ref 0.6–1.3)
EOSINOPHIL # BLD AUTO: 0.08 THOUSAND/ΜL (ref 0–0.61)
EOSINOPHIL NFR BLD AUTO: 1 % (ref 0–6)
ERYTHROCYTE [DISTWIDTH] IN BLOOD BY AUTOMATED COUNT: 13.8 % (ref 11.6–15.1)
GFR SERPL CREATININE-BSD FRML MDRD: 101 ML/MIN/1.73SQ M
GLUCOSE SERPL-MCNC: 111 MG/DL (ref 65–140)
GLUCOSE UR STRIP-MCNC: NEGATIVE MG/DL
HCT VFR BLD AUTO: 36 % (ref 34.8–46.1)
HGB BLD-MCNC: 12.3 G/DL (ref 11.5–15.4)
HGB UR QL STRIP.AUTO: NEGATIVE
HYALINE CASTS #/AREA URNS LPF: ABNORMAL /LPF
KETONES UR STRIP-MCNC: NEGATIVE MG/DL
LEUKOCYTE ESTERASE UR QL STRIP: NEGATIVE
LYMPHOCYTES # BLD AUTO: 1.06 THOUSANDS/ΜL (ref 0.6–4.47)
LYMPHOCYTES NFR BLD AUTO: 14 % (ref 14–44)
MCH RBC QN AUTO: 28.9 PG (ref 26.8–34.3)
MCHC RBC AUTO-ENTMCNC: 34.2 G/DL (ref 31.4–37.4)
MCV RBC AUTO: 85 FL (ref 82–98)
MONOCYTES # BLD AUTO: 0.6 THOUSAND/ΜL (ref 0.17–1.22)
MONOCYTES NFR BLD AUTO: 8 % (ref 4–12)
NEUTROPHILS # BLD AUTO: 6 THOUSANDS/ΜL (ref 1.85–7.62)
NEUTS SEG NFR BLD AUTO: 77 % (ref 43–75)
NITRITE UR QL STRIP: NEGATIVE
NON-SQ EPI CELLS URNS QL MICRO: ABNORMAL /HPF
NRBC BLD AUTO-RTO: 0 /100 WBCS
PH UR STRIP.AUTO: 6.5 [PH] (ref 4.5–8)
PLATELET # BLD AUTO: 172 THOUSANDS/UL (ref 149–390)
PMV BLD AUTO: 10.6 FL (ref 8.9–12.7)
POTASSIUM SERPL-SCNC: 3.9 MMOL/L (ref 3.5–5.3)
PROT UR STRIP-MCNC: ABNORMAL MG/DL
RBC # BLD AUTO: 4.26 MILLION/UL (ref 3.81–5.12)
RBC #/AREA URNS AUTO: ABNORMAL /HPF
SODIUM SERPL-SCNC: 140 MMOL/L (ref 136–145)
SP GR UR STRIP.AUTO: 1.02 (ref 1–1.03)
UROBILINOGEN UR QL STRIP.AUTO: 1 E.U./DL
WBC # BLD AUTO: 7.78 THOUSAND/UL (ref 4.31–10.16)
WBC #/AREA URNS AUTO: ABNORMAL /HPF

## 2017-10-29 PROCEDURE — 36415 COLL VENOUS BLD VENIPUNCTURE: CPT | Performed by: EMERGENCY MEDICINE

## 2017-10-29 PROCEDURE — 85025 COMPLETE CBC W/AUTO DIFF WBC: CPT | Performed by: EMERGENCY MEDICINE

## 2017-10-29 PROCEDURE — 96360 HYDRATION IV INFUSION INIT: CPT

## 2017-10-29 PROCEDURE — 99284 EMERGENCY DEPT VISIT MOD MDM: CPT

## 2017-10-29 PROCEDURE — 81001 URINALYSIS AUTO W/SCOPE: CPT | Performed by: EMERGENCY MEDICINE

## 2017-10-29 PROCEDURE — 80048 BASIC METABOLIC PNL TOTAL CA: CPT | Performed by: EMERGENCY MEDICINE

## 2017-10-29 PROCEDURE — 74176 CT ABD & PELVIS W/O CONTRAST: CPT

## 2017-10-29 RX ORDER — OXYCODONE HYDROCHLORIDE AND ACETAMINOPHEN 5; 325 MG/1; MG/1
1 TABLET ORAL ONCE
Status: COMPLETED | OUTPATIENT
Start: 2017-10-29 | End: 2017-10-29

## 2017-10-29 RX ORDER — KETOROLAC TROMETHAMINE 30 MG/ML
15 INJECTION, SOLUTION INTRAMUSCULAR; INTRAVENOUS ONCE
Status: DISCONTINUED | OUTPATIENT
Start: 2017-10-29 | End: 2017-10-29

## 2017-10-29 RX ORDER — OXYCODONE HYDROCHLORIDE AND ACETAMINOPHEN 5; 325 MG/1; MG/1
1 TABLET ORAL EVERY 4 HOURS PRN
Qty: 5 TABLET | Refills: 0 | Status: SHIPPED | OUTPATIENT
Start: 2017-10-29 | End: 2017-11-08

## 2017-10-29 RX ADMIN — SODIUM CHLORIDE 1000 ML: 0.9 INJECTION, SOLUTION INTRAVENOUS at 02:07

## 2017-10-29 RX ADMIN — OXYCODONE HYDROCHLORIDE AND ACETAMINOPHEN 1 TABLET: 5; 325 TABLET ORAL at 02:08

## 2017-10-29 NOTE — ED ATTENDING ATTESTATION
Zoey Ramos MD, saw and evaluated the patient  I have discussed the patient with the resident/non-physician practitioner and agree with the resident's/non-physician practitioner's findings, Plan of Care, and MDM as documented in the resident's/non-physician practitioner's note, except where noted  All available labs and Radiology studies were reviewed  At this point I agree with the current assessment done in the Emergency Department  I have conducted an independent evaluation of this patient including a focused history of:    Emergency Department Note- Hannah Mark 42 y o  female MRN: [id number]    Unit/Bed#: ED 06 Encounter: [id number]    Hannah Mark is a 42 y o  female who presents with   Chief Complaint   Patient presents with    Flank Pain     pt c/o flank pain starting tonight getting worse now          History of Present Illness   HPI:  Hannah Mark is a 42 y o  female who presents for evaluation of:  Right flank pain reminiscent of when she had postoperative ureteral damage  Pain is severe  Patient denies hematuria and dysuria  The patient denies associated nausea, vomiting, fevers, chills, dyspnea, sputum production  The pain is a dull aching sensation in her right flank right lower back area  Movement does not exacerbate the discomfort  The pain is moderate to severe in intensity      Review of Systems    Historical Information   Past Medical History:   Diagnosis Date    Migraine     PONV (postoperative nausea and vomiting)      Past Surgical History:   Procedure Laterality Date    APPENDECTOMY      DILATION AND CURETTAGE OF UTERUS N/A 12/30/2016    Procedure: DILATATION AND CURETTAGE;  Surgeon: Antoine Yepez MD;  [facility];  Service:    Bryanna Irish DILATION AND EVACUATION      HYSTERECTOMY N/A 5/22/2017    Procedure: EXAM UNDER ANESTHESIA;  Surgeon: Antoine Yepez MD;  [facility];  Service:     HYSTEROSCOPY N/A 12/30/2016    Procedure: HYSTEROSCOPY;  Surgeon: Ani Bradley Lilly Donnelly MD;  [facility];  Service:    Karson Salazar CYSTOURETHROSCOPY,URETER CATHETER Bilateral 3/27/2017    Procedure: CYSTOSCOPY WITH RETROGRADE PYELOGRAM; RIGHT STENT INSERTION;  Surgeon: Rambo Ardno MD;  [facility];  Service: Urology    OK LAPAROSCOPY W TOT HYSTERECT UTERUS 250 GRAM OR LESS N/A 3/24/2017    Procedure: TOTAL LAPAROSCOPIC HYSTERECTOMY   bilateral salpingectomy, cysto; Surgeon: Mirta Moffett MD;  [facility];  Service: Gynecology     Social History   History   Alcohol Use    Yes     Comment: occasional     History   Drug Use No     History   Smoking Status    Former Smoker    Packs/day: 0 20    Types: Cigarettes    Quit date: 2007   Smokeless Tobacco    Never Used     Family History: non-contributory    Meds/Allergies   all medications and allergies reviewed  Allergies   Allergen Reactions    Shellfish-Derived Products Anaphylaxis       Objective   First Vitals:   Blood Pressure: 131/73 (10/29/17 0034)  Pulse: 84 (10/29/17 0034)  Temperature: 98 6 °F (37 °C) (10/29/17 0034)  Temp Source: Oral (10/29/17 0034)  Respirations: 18 (10/29/17 0034)  Weight - Scale: 59 kg (130 lb) (10/29/17 0034)  SpO2: 95 % (10/29/17 0034)    Current Vitals:   Blood Pressure: 131/73 (10/29/17 0034)  Pulse: 84 (10/29/17 0034)  Temperature: 98 6 °F (37 °C) (10/29/17 0034)  Temp Source: Oral (10/29/17 0034)  Respirations: 18 (10/29/17 0034)  Weight - Scale: 59 kg (130 lb) (10/29/17 0034)  SpO2: 95 % (10/29/17 0034)      Intake/Output Summary (Last 24 hours) at 10/29/17 1447  Last data filed at 10/29/17 0426   Gross per 24 hour   Intake              330 ml   Output                0 ml   Net              330 ml       Invasive Devices          No matching active lines, drains, or airways          Physical Exam   Constitutional: She is oriented to person, place, and time  She appears well-developed and well-nourished  HENT:   Head: Normocephalic and atraumatic     Eyes: Conjunctivae are normal  Pupils are equal, round, and reactive to light  Abdominal: Soft  Bowel sounds are normal    Musculoskeletal: Normal range of motion  She exhibits no deformity  Neurological: She is alert and oriented to person, place, and time  Skin: Skin is warm and dry  Psychiatric: She has a normal mood and affect  Her behavior is normal  Judgment and thought content normal    Nursing note and vitals reviewed  Medical Decision Makin  Acute flank pain:  Plan to obtain a CT scan of the abdomen and pelvis to rule out acute ureteral colic  Plan to obtain a basic metabolic profile to rule out uremia and electrolyte disturbance  Plan to administer pain control      Recent Results (from the past 36 hour(s))   Basic metabolic panel    Collection Time: 10/29/17  2:07 AM   Result Value Ref Range    Sodium 140 136 - 145 mmol/L    Potassium 3 9 3 5 - 5 3 mmol/L    Chloride 107 100 - 108 mmol/L    CO2 27 21 - 32 mmol/L    Anion Gap 6 4 - 13 mmol/L    BUN 12 5 - 25 mg/dL    Creatinine 0 75 0 60 - 1 30 mg/dL    Glucose 111 65 - 140 mg/dL    Calcium 8 6 8 3 - 10 1 mg/dL    eGFR 101 ml/min/1 73sq m   CBC and differential    Collection Time: 10/29/17  2:07 AM   Result Value Ref Range    WBC 7 78 4 31 - 10 16 Thousand/uL    RBC 4 26 3 81 - 5 12 Million/uL    Hemoglobin 12 3 11 5 - 15 4 g/dL    Hematocrit 36 0 34 8 - 46 1 %    MCV 85 82 - 98 fL    MCH 28 9 26 8 - 34 3 pg    MCHC 34 2 31 4 - 37 4 g/dL    RDW 13 8 11 6 - 15 1 %    MPV 10 6 8 9 - 12 7 fL    Platelets 447 845 - 944 Thousands/uL    nRBC 0 /100 WBCs    Neutrophils Relative 77 (H) 43 - 75 %    Lymphocytes Relative 14 14 - 44 %    Monocytes Relative 8 4 - 12 %    Eosinophils Relative 1 0 - 6 %    Basophils Relative 0 0 - 1 %    Neutrophils Absolute 6 00 1 85 - 7 62 Thousands/µL    Lymphocytes Absolute 1 06 0 60 - 4 47 Thousands/µL    Monocytes Absolute 0 60 0 17 - 1 22 Thousand/µL    Eosinophils Absolute 0 08 0 00 - 0 61 Thousand/µL    Basophils Absolute 0 03 0 00 - 0 10 Thousands/µL   UA w Reflex to Microscopic w Reflex to Culture    Collection Time: 10/29/17  2:09 AM   Result Value Ref Range    Color, UA Yellow     Clarity, UA Clear     Specific Gravity, UA 1 020 1 003 - 1 030    pH, UA 6 5 4 5 - 8 0    Leukocytes, UA Negative Negative    Nitrite, UA Negative Negative    Protein, UA 30 (1+) (A) Negative mg/dl    Glucose, UA Negative Negative mg/dl    Ketones, UA Negative Negative mg/dl    Urobilinogen, UA 1 0 0 2, 1 0 E U /dl E U /dl    Bilirubin, UA Negative Negative    Blood, UA Negative Negative   Urine Microscopic    Collection Time: 10/29/17  2:09 AM   Result Value Ref Range    RBC, UA 2-4 (A) None Seen, 0-5 /hpf    WBC, UA None Seen None Seen, 0-5, 5-55, 5-65 /hpf    Epithelial Cells None Seen None Seen, Occasional /hpf    Bacteria, UA None Seen None Seen, Occasional /hpf    Hyaline Casts, UA None Seen None Seen /lpf     CT renal stone study abdomen pelvis wo contrast   Final Result   Recurrent severe right-sided hydroureteronephrosis, similar to the prior CT scan of March 27, 2017  This however is new from the ultrasound of April 12, 2017  Findings are consistent with the preliminary report from [facility] which was provided shortly after completion of the exam                       Workstation performed: [id number]               Portions of the record may have been created with voice recognition software  Occasional wrong word or "sound a like" substitutions may have occurred due to the inherent limitations of voice recognition software  Read the chart carefully and recognize, using context, where substitutions have occurred

## 2017-10-29 NOTE — ED PROVIDER NOTES
ASSESSMENT AND PLAN    Yael Wilson is a 42 y o  female with a history of a hysterectomy earlier this year, complicated by ureteral damage intraoperatively which has required a stent in the past, which was removed in approximately April of 2017, who presents with right-sided flank pain, which the patient states is similar to how she presented initially with her ureteral damage  She is here currently hemodynamically stable, well-appearing, and appears very uncomfortable at bedside, but is in no other acute distress  Differential diagnosis nephrolithiasis, versus exacerbation of her previous ureteral condition   -check CBC and BMP  -bedside right right upper quadrant ultrasound significant for marked hydronephrosis and hydroureter  According to her patient's previous imaging, and previous urology notes, this is likely a chronic finding   -will check CT stone study  -urinalysis  -will get Percocet for pain  Toradol likely contraindicated, as it is likely that her right kidney is minimally functional due to the nature of her chronic hydronephrosis  -frequent vital signs  -disposition pending clinical workup    ED COURSE    Labwork and Imaging reviewed and unremarkable  CT stone study negative for stone, but did show chronic hydrocephalus  -on re-evaluation, the patient pain is now under control, and the patient expresses relief with Percocet  The patient expresses that she feels able to go home, and follow up with her urologist and PCP as an outpatient for further workup  -plans discharge the patient home to self-care  The patient was prescribed with strict return precautions  I discussed this with the patient and her , they are both in agreement understanding of this plan  -I will write for 5 dose of Percocet p r n  for pain  The patient states that she will call her urologist on Monday with her office is open to schedule appointment for further follow-up      History  Chief Complaint   Patient presents with    Flank Pain     pt c/o flank pain starting tonight getting worse now      42-year-old female with a history of a hysterectomy earlier this year, complicated at that time by ureteral damage intraoperatively, which was treated with a stent for 1 month her urologist Dr Marck Rothman  No other relevant medical history  The patient presents with right-sided flank pain, the patient states that the pain started earlier this evening approximately between 10 and 11 o'clock, the patient did go to sleep this time hoping that would resolve, the pain did wake up again at around midnight  She took Advil at this time which did relieve her pain mildly, due to her discussed comfort she decided to come to the emergency department for further evaluation  The patient states the pain is sharp, constant, nonradiating, and reminds her of the pain she had immediately after surgery when she had her ureteral complication  She denies fevers, chills, chest pain, shortness of breath,, nausea, vomiting, diarrhea, abdominal pain, hematuria, dysuria, vaginal discharge  Prior to Admission Medications   Prescriptions Last Dose Informant Patient Reported?  Taking?   ferrous sulfate 325 (65 Fe) mg tablet 10/28/2017 at Unknown time  Yes Yes   Sig: Take 325 mg by mouth daily with breakfast      Facility-Administered Medications: None       Past Medical History:   Diagnosis Date    Migraine     PONV (postoperative nausea and vomiting)        Past Surgical History:   Procedure Laterality Date    APPENDECTOMY      DILATION AND CURETTAGE OF UTERUS N/A 12/30/2016    Procedure: DILATATION AND CURETTAGE;  Surgeon: Yoni Pretty MD;  [facility];  Service:    [address] [place] [address] N/A 5/22/2017    Procedure: EXAM UNDER ANESTHESIA;  Surgeon: Yoni Pretty MD;  [facility];  Service:     HYSTEROSCOPY N/A 12/30/2016    Procedure: HYSTEROSCOPY;  Surgeon: Yoni Pretty MD;  [facility]; Service:     VA CYSTOURETHROSCOPY,URETER CATHETER Bilateral 3/27/2017    Procedure: CYSTOSCOPY WITH RETROGRADE PYELOGRAM; RIGHT STENT INSERTION;  Surgeon: Radha Amado MD;  [facility];  Service: Urology    VA LAPAROSCOPY W TOT HYSTERECT UTERUS 250 GRAM OR LESS N/A 3/24/2017    Procedure: TOTAL LAPAROSCOPIC HYSTERECTOMY   bilateral salpingectomy, cysto; Surgeon: Dionne Alarcon MD;  [facility];  Service: Gynecology       History reviewed  No pertinent family history  I have reviewed and agree with the history as documented  Social History   Substance Use Topics    Smoking status: Former Smoker     Packs/day: 0 20     Types: Cigarettes     Quit date: 2007    Smokeless tobacco: Never Used    Alcohol use Yes      Comment: occasional        Review of Systems   Constitutional: Negative for chills and fever  HENT: Negative for congestion  Eyes: Negative for photophobia and visual disturbance  Respiratory: Negative for cough and shortness of breath  Cardiovascular: Negative for chest pain and palpitations  Gastrointestinal: Negative for diarrhea, nausea and vomiting  Endocrine: Negative for polyphagia and polyuria  Genitourinary: Positive for flank pain  Negative for decreased urine volume, difficulty urinating, dysuria and hematuria  Musculoskeletal: Negative for neck pain and neck stiffness  Skin: Negative for pallor and rash  Neurological: Negative for syncope, light-headedness and headaches         Physical Exam  ED Triage Vitals [10/29/17 0034]   Temperature Pulse Respirations Blood Pressure SpO2   98 6 °F (37 °C) 84 18 131/73 95 %      Temp Source Heart Rate Source Patient Position - Orthostatic VS BP Location FiO2 (%)   Oral Monitor Lying Right arm --      Pain Score       --           Orthostatic Vital Signs  Vitals:    10/29/17 0034   BP: 131/73   Pulse: 84   Patient Position - Orthostatic VS: Lying       Physical Exam   Constitutional: She is oriented to person, place, and time  Awake and alert, uncomfortable appearing, no acute distress   HENT:   Head: Normocephalic  Mouth/Throat: No oropharyngeal exudate  Eyes: Pupils are equal, round, and reactive to light  No scleral icterus  Neck: Normal range of motion  No JVD present  Cardiovascular: Normal rate, regular rhythm and normal heart sounds  No murmur heard  Pulmonary/Chest: Effort normal  No respiratory distress  She has no wheezes  She has no rales  Abdominal: Soft  She exhibits no distension  There is tenderness (Right lower quadrant tenderness to palpation, that rigidity, guarding, distention, rebound)  Musculoskeletal: Normal range of motion  She exhibits no edema  Neurological: She is alert and oriented to person, place, and time  No cranial nerve deficit  Skin: Skin is warm and dry  No rash noted  ED Medications  Medications   sodium chloride 0 9 % bolus 1,000 mL (0 mL Intravenous Stopped 10/29/17 0426)   oxyCODONE-acetaminophen (PERCOCET) 5-325 mg per tablet 1 tablet (1 tablet Oral Given 10/29/17 0208)       Diagnostic Studies  Results Reviewed     Procedure Component Value Units Date/Time    Basic metabolic panel [[id number]] Collected:  10/29/17 0207    Lab Status:  Final result Specimen:  Blood from Arm, Left Updated:  10/29/17 0234     Sodium 140 mmol/L      Potassium 3 9 mmol/L      Chloride 107 mmol/L      CO2 27 mmol/L      Anion Gap 6 mmol/L      BUN 12 mg/dL      Creatinine 0 75 mg/dL      Glucose 111 mg/dL      Calcium 8 6 mg/dL      eGFR 101 ml/min/1 73sq m     Narrative:         National Kidney Disease Education Program recommendations are as follows:  GFR calculation is accurate only with a steady state creatinine  Chronic Kidney disease less than 60 ml/min/1 73 sq  meters  Kidney failure less than 15 ml/min/1 73 sq  meters      Urine Microscopic [[id number]]  (Abnormal) Collected:  10/29/17 0209    Lab Status:  Final result Specimen:  Urine from Urine, Clean Catch Updated: 10/29/17 0225     RBC, UA 2-4 (A) /hpf      WBC, UA None Seen /hpf      Epithelial Cells None Seen /hpf      Bacteria, UA None Seen /hpf      Hyaline Casts, UA None Seen /lpf     UA w Reflex to Microscopic w Reflex to Culture [[id number]]  (Abnormal) Collected:  10/29/17 0209    Lab Status:  Final result Specimen:  Urine from Urine, Clean Catch Updated:  10/29/17 0221     Color, UA Yellow     Clarity, UA Clear     Specific [place], UA 1 020     pH, UA 6 5     Leukocytes, UA Negative     Nitrite, UA Negative     Protein, UA 30 (1+) (A) mg/dl      Glucose, UA Negative mg/dl      Ketones, UA Negative mg/dl      Urobilinogen, UA 1 0 E U /dl      Bilirubin, UA Negative     Blood, UA Negative    CBC and differential [[id number]]  (Abnormal) Collected:  10/29/17 0207    Lab Status:  Final result Specimen:  Blood from Arm, Left Updated:  10/29/17 0216     WBC 7 78 Thousand/uL      RBC 4 26 Million/uL      Hemoglobin 12 3 g/dL      Hematocrit 36 0 %      MCV 85 fL      MCH 28 9 pg      MCHC 34 2 g/dL      RDW 13 8 %      MPV 10 6 fL      Platelets 989 Thousands/uL      nRBC 0 /100 WBCs      Neutrophils Relative 77 (H) %      Lymphocytes Relative 14 %      Monocytes Relative 8 %      Eosinophils Relative 1 %      Basophils Relative 0 %      Neutrophils Absolute 6 00 Thousands/µL      Lymphocytes Absolute 1 06 Thousands/µL      Monocytes Absolute 0 60 Thousand/µL      Eosinophils Absolute 0 08 Thousand/µL      Basophils Absolute 0 03 Thousands/µL                  CT renal stone study abdomen pelvis wo contrast   Final Result by Namita Mercado DO (10/29 2192)   Recurrent severe right-sided hydroureteronephrosis, similar to the prior CT scan of March 27, 2017  This however is new from the ultrasound of April 12, 2017        Findings are consistent with the preliminary report from [facility] which was provided shortly after completion of the exam                       Workstation performed: [id number] Procedures  Procedures      Phone Consults  ED Phone Contact    ED Course  ED Course                                MDM  CritCare Time    Disposition  Final diagnoses:   Rt flank pain     Time reflects when diagnosis was documented in both MDM as applicable and the Disposition within this note     Time User Action Codes Description Comment    10/29/2017  3:05 AM Roxanne List Add [R10 9] Rt flank pain       ED Disposition     ED Disposition Condition Comment    Discharge  Génesis Villagran discharge to home/self care  Condition at discharge: Good        Follow-up Information     Follow up With Specialties Details Why Contact Info    Ladonna Oseguera DO Internal Medicine, [facility] Call  [address] [facility]      Trey Casiano MD Urology Call today  [address], [address]  [facility] [address]  [phone]          Discharge Medication List as of 10/29/2017  3:08 AM      START taking these medications    Details   oxyCODONE-acetaminophen (PERCOCET) 5-325 mg per tablet Take 1 tablet by mouth every 4 (four) hours as needed for moderate pain for up to 10 days Max Daily Amount: 6 tablets, Starting Sun 10/29/2017, Until Wed 11/8/2017, Print         CONTINUE these medications which have NOT CHANGED    Details   ferrous sulfate 325 (65 Fe) mg tablet Take 325 mg by mouth daily with breakfast, Until Discontinued, Historical Med           No discharge procedures on file  ED Provider  Attending physically available and evaluated May Jas WILKERSON managed the patient along with the ED Attending      Electronically Signed by         Reba Giordano MD  Resident  10/29/17 7451

## 2017-11-02 ENCOUNTER — ANESTHESIA EVENT (OUTPATIENT)
Dept: PERIOP | Facility: HOSPITAL | Age: 37
End: 2017-11-02
Payer: COMMERCIAL

## 2017-11-02 ENCOUNTER — HOSPITAL ENCOUNTER (OUTPATIENT)
Facility: HOSPITAL | Age: 37
Setting detail: OUTPATIENT SURGERY
Discharge: HOME | End: 2017-11-03
Attending: UROLOGY | Admitting: UROLOGY
Payer: COMMERCIAL

## 2017-11-02 ENCOUNTER — ANESTHESIA (OUTPATIENT)
Dept: PERIOP | Facility: HOSPITAL | Age: 37
End: 2017-11-02
Payer: COMMERCIAL

## 2017-11-02 ENCOUNTER — APPOINTMENT (OUTPATIENT)
Dept: RADIOLOGY | Facility: HOSPITAL | Age: 37
End: 2017-11-02
Payer: COMMERCIAL

## 2017-11-02 DIAGNOSIS — N13.39 OTHER HYDRONEPHROSIS: ICD-10-CM

## 2017-11-02 PROBLEM — N13.5 URETERAL STRICTURE, RIGHT: Status: ACTIVE | Noted: 2017-11-02

## 2017-11-02 PROCEDURE — C1758 CATHETER, URETERAL: HCPCS | Performed by: UROLOGY

## 2017-11-02 PROCEDURE — C2617 STENT, NON-COR, TEM W/O DEL: HCPCS | Performed by: UROLOGY

## 2017-11-02 PROCEDURE — C1726 CATH, BAL DIL, NON-VASCULAR: HCPCS | Performed by: UROLOGY

## 2017-11-02 PROCEDURE — C1769 GUIDE WIRE: HCPCS | Performed by: UROLOGY

## 2017-11-02 PROCEDURE — 74420 UROGRAPHY RTRGR +-KUB: CPT

## 2017-11-02 PROCEDURE — 87086 URINE CULTURE/COLONY COUNT: CPT | Performed by: UROLOGY

## 2017-11-02 DEVICE — STENT URETERAL 4.7FR 26CM INLAY OPTIMA
Type: IMPLANTABLE DEVICE | Site: KIDNEY | Status: NON-FUNCTIONAL
Removed: 2018-01-11

## 2017-11-02 RX ORDER — PROPOFOL 10 MG/ML
INJECTION, EMULSION INTRAVENOUS AS NEEDED
Status: DISCONTINUED | OUTPATIENT
Start: 2017-11-02 | End: 2017-11-02 | Stop reason: SURG

## 2017-11-02 RX ORDER — SODIUM CHLORIDE, SODIUM LACTATE, POTASSIUM CHLORIDE, CALCIUM CHLORIDE 600; 310; 30; 20 MG/100ML; MG/100ML; MG/100ML; MG/100ML
75 INJECTION, SOLUTION INTRAVENOUS CONTINUOUS
Status: DISCONTINUED | OUTPATIENT
Start: 2017-11-02 | End: 2017-11-02

## 2017-11-02 RX ORDER — FENTANYL CITRATE 50 UG/ML
INJECTION, SOLUTION INTRAMUSCULAR; INTRAVENOUS AS NEEDED
Status: DISCONTINUED | OUTPATIENT
Start: 2017-11-02 | End: 2017-11-02 | Stop reason: SURG

## 2017-11-02 RX ORDER — ONDANSETRON 2 MG/ML
4 INJECTION INTRAMUSCULAR; INTRAVENOUS EVERY 6 HOURS PRN
Status: DISCONTINUED | OUTPATIENT
Start: 2017-11-02 | End: 2017-11-03 | Stop reason: HOSPADM

## 2017-11-02 RX ORDER — ONDANSETRON 2 MG/ML
INJECTION INTRAMUSCULAR; INTRAVENOUS AS NEEDED
Status: DISCONTINUED | OUTPATIENT
Start: 2017-11-02 | End: 2017-11-02 | Stop reason: SURG

## 2017-11-02 RX ORDER — OXYCODONE HYDROCHLORIDE AND ACETAMINOPHEN 5; 325 MG/1; MG/1
1 TABLET ORAL EVERY 4 HOURS PRN
Status: DISCONTINUED | OUTPATIENT
Start: 2017-11-02 | End: 2017-11-03

## 2017-11-02 RX ORDER — DIPHENHYDRAMINE HYDROCHLORIDE 50 MG/ML
INJECTION INTRAMUSCULAR; INTRAVENOUS AS NEEDED
Status: DISCONTINUED | OUTPATIENT
Start: 2017-11-02 | End: 2017-11-02 | Stop reason: SURG

## 2017-11-02 RX ORDER — OXYCODONE HYDROCHLORIDE AND ACETAMINOPHEN 5; 325 MG/1; MG/1
1 TABLET ORAL EVERY 4 HOURS PRN
Status: DISCONTINUED | OUTPATIENT
Start: 2017-11-02 | End: 2017-11-02 | Stop reason: SDUPTHER

## 2017-11-02 RX ORDER — MAGNESIUM HYDROXIDE 1200 MG/15ML
LIQUID ORAL AS NEEDED
Status: DISCONTINUED | OUTPATIENT
Start: 2017-11-02 | End: 2017-11-02 | Stop reason: HOSPADM

## 2017-11-02 RX ORDER — CEFUROXIME AXETIL 500 MG/1
500 TABLET ORAL EVERY 12 HOURS SCHEDULED
Qty: 10 TABLET | Refills: 0 | Status: SHIPPED | OUTPATIENT
Start: 2017-11-02 | End: 2017-11-03

## 2017-11-02 RX ORDER — MIDAZOLAM HYDROCHLORIDE 1 MG/ML
INJECTION INTRAMUSCULAR; INTRAVENOUS AS NEEDED
Status: DISCONTINUED | OUTPATIENT
Start: 2017-11-02 | End: 2017-11-02 | Stop reason: SURG

## 2017-11-02 RX ORDER — HYDROMORPHONE HYDROCHLORIDE 2 MG/ML
0.5 INJECTION, SOLUTION INTRAMUSCULAR; INTRAVENOUS; SUBCUTANEOUS ONCE
Status: DISCONTINUED | OUTPATIENT
Start: 2017-11-02 | End: 2017-11-02

## 2017-11-02 RX ORDER — LIDOCAINE HYDROCHLORIDE 10 MG/ML
INJECTION, SOLUTION INFILTRATION; PERINEURAL AS NEEDED
Status: DISCONTINUED | OUTPATIENT
Start: 2017-11-02 | End: 2017-11-02 | Stop reason: SURG

## 2017-11-02 RX ORDER — SODIUM CHLORIDE, SODIUM LACTATE, POTASSIUM CHLORIDE, CALCIUM CHLORIDE 600; 310; 30; 20 MG/100ML; MG/100ML; MG/100ML; MG/100ML
150 INJECTION, SOLUTION INTRAVENOUS CONTINUOUS
Status: DISCONTINUED | OUTPATIENT
Start: 2017-11-02 | End: 2017-11-02

## 2017-11-02 RX ORDER — DOCUSATE SODIUM 100 MG/1
100 CAPSULE, LIQUID FILLED ORAL 2 TIMES DAILY
Status: DISCONTINUED | OUTPATIENT
Start: 2017-11-02 | End: 2017-11-03 | Stop reason: HOSPADM

## 2017-11-02 RX ORDER — TRAMADOL HYDROCHLORIDE 50 MG/1
50 TABLET ORAL EVERY 6 HOURS PRN
Qty: 30 TABLET | Refills: 0 | Status: SHIPPED | OUTPATIENT
Start: 2017-11-02 | End: 2017-11-03

## 2017-11-02 RX ORDER — OMEGA-3 FATTY ACIDS/FISH OIL 300-1000MG
2 CAPSULE ORAL
COMMUNITY
End: 2018-01-05

## 2017-11-02 RX ORDER — OXYCODONE HYDROCHLORIDE AND ACETAMINOPHEN 5; 325 MG/1; MG/1
1 TABLET ORAL EVERY 4 HOURS PRN
Status: DISCONTINUED | OUTPATIENT
Start: 2017-11-02 | End: 2017-11-02

## 2017-11-02 RX ORDER — ONDANSETRON 2 MG/ML
4 INJECTION INTRAMUSCULAR; INTRAVENOUS ONCE AS NEEDED
Status: COMPLETED | OUTPATIENT
Start: 2017-11-02 | End: 2017-11-02

## 2017-11-02 RX ORDER — DEXTROSE, SODIUM CHLORIDE, AND POTASSIUM CHLORIDE 5; .45; .15 G/100ML; G/100ML; G/100ML
125 INJECTION INTRAVENOUS CONTINUOUS
Status: DISCONTINUED | OUTPATIENT
Start: 2017-11-02 | End: 2017-11-03 | Stop reason: HOSPADM

## 2017-11-02 RX ORDER — SODIUM CHLORIDE, SODIUM LACTATE, POTASSIUM CHLORIDE, CALCIUM CHLORIDE 600; 310; 30; 20 MG/100ML; MG/100ML; MG/100ML; MG/100ML
100 INJECTION, SOLUTION INTRAVENOUS CONTINUOUS
Status: DISCONTINUED | OUTPATIENT
Start: 2017-11-02 | End: 2017-11-02

## 2017-11-02 RX ORDER — METOCLOPRAMIDE HYDROCHLORIDE 5 MG/ML
10 INJECTION INTRAMUSCULAR; INTRAVENOUS ONCE AS NEEDED
Status: DISCONTINUED | OUTPATIENT
Start: 2017-11-02 | End: 2017-11-02 | Stop reason: HOSPADM

## 2017-11-02 RX ORDER — FENTANYL CITRATE/PF 50 MCG/ML
25 SYRINGE (ML) INJECTION
Status: DISCONTINUED | OUTPATIENT
Start: 2017-11-02 | End: 2017-11-02 | Stop reason: HOSPADM

## 2017-11-02 RX ADMIN — SODIUM CHLORIDE, SODIUM LACTATE, POTASSIUM CHLORIDE, AND CALCIUM CHLORIDE 100 ML/HR: .6; .31; .03; .02 INJECTION, SOLUTION INTRAVENOUS at 09:44

## 2017-11-02 RX ADMIN — PROPOFOL 50 MG: 10 INJECTION, EMULSION INTRAVENOUS at 10:36

## 2017-11-02 RX ADMIN — PROPOFOL 50 MG: 10 INJECTION, EMULSION INTRAVENOUS at 12:18

## 2017-11-02 RX ADMIN — PROPOFOL 50 MG: 10 INJECTION, EMULSION INTRAVENOUS at 12:24

## 2017-11-02 RX ADMIN — SODIUM CHLORIDE, SODIUM LACTATE, POTASSIUM CHLORIDE, AND CALCIUM CHLORIDE: .6; .31; .03; .02 INJECTION, SOLUTION INTRAVENOUS at 12:10

## 2017-11-02 RX ADMIN — FENTANYL CITRATE 50 MCG: 50 INJECTION, SOLUTION INTRAMUSCULAR; INTRAVENOUS at 11:10

## 2017-11-02 RX ADMIN — MIDAZOLAM HYDROCHLORIDE 2 MG: 1 INJECTION, SOLUTION INTRAMUSCULAR; INTRAVENOUS at 10:29

## 2017-11-02 RX ADMIN — HYDROMORPHONE HYDROCHLORIDE 0.5 MG: 1 INJECTION, SOLUTION INTRAMUSCULAR; INTRAVENOUS; SUBCUTANEOUS at 15:30

## 2017-11-02 RX ADMIN — ONDANSETRON 4 MG: 2 INJECTION INTRAMUSCULAR; INTRAVENOUS at 13:03

## 2017-11-02 RX ADMIN — ONDANSETRON 4 MG: 2 INJECTION INTRAMUSCULAR; INTRAVENOUS at 10:35

## 2017-11-02 RX ADMIN — PROPOFOL 150 MG: 10 INJECTION, EMULSION INTRAVENOUS at 11:45

## 2017-11-02 RX ADMIN — DOCUSATE SODIUM 100 MG: 100 CAPSULE, LIQUID FILLED ORAL at 21:14

## 2017-11-02 RX ADMIN — CEFAZOLIN SODIUM 2000 MG: 2 SOLUTION INTRAVENOUS at 10:40

## 2017-11-02 RX ADMIN — LIDOCAINE HYDROCHLORIDE 60 MG: 10 INJECTION, SOLUTION INFILTRATION; PERINEURAL at 10:35

## 2017-11-02 RX ADMIN — OXYCODONE HYDROCHLORIDE AND ACETAMINOPHEN 1 TABLET: 5; 325 TABLET ORAL at 19:39

## 2017-11-02 RX ADMIN — CEFAZOLIN SODIUM 1000 MG: 1 SOLUTION INTRAVENOUS at 21:07

## 2017-11-02 RX ADMIN — DEXTROSE, SODIUM CHLORIDE, AND POTASSIUM CHLORIDE 125 ML/HR: 5; .45; .15 INJECTION INTRAVENOUS at 21:34

## 2017-11-02 RX ADMIN — HYDROMORPHONE HYDROCHLORIDE 0.5 MG: 1 INJECTION, SOLUTION INTRAMUSCULAR; INTRAVENOUS; SUBCUTANEOUS at 17:07

## 2017-11-02 RX ADMIN — PROPOFOL 50 MG: 10 INJECTION, EMULSION INTRAVENOUS at 11:53

## 2017-11-02 RX ADMIN — PROPOFOL 50 MG: 10 INJECTION, EMULSION INTRAVENOUS at 12:25

## 2017-11-02 RX ADMIN — DIPHENHYDRAMINE HYDROCHLORIDE 12.5 MG: 50 INJECTION, SOLUTION INTRAMUSCULAR; INTRAVENOUS at 12:24

## 2017-11-02 RX ADMIN — FENTANYL CITRATE 50 MCG: 50 INJECTION, SOLUTION INTRAMUSCULAR; INTRAVENOUS at 10:39

## 2017-11-02 RX ADMIN — DEXAMETHASONE SODIUM PHOSPHATE 10 MG: 10 INJECTION INTRAMUSCULAR; INTRAVENOUS at 10:35

## 2017-11-02 RX ADMIN — OXYCODONE HYDROCHLORIDE AND ACETAMINOPHEN 1 TABLET: 5; 325 TABLET ORAL at 15:13

## 2017-11-02 RX ADMIN — PROPOFOL 150 MG: 10 INJECTION, EMULSION INTRAVENOUS at 10:35

## 2017-11-02 NOTE — ANESTHESIA PREPROCEDURE EVALUATION
Review of Systems/Medical History  Patient summary reviewed  Chart reviewed  History of anesthetic complications PONV    Cardiovascular  Negative cardio ROS    Pulmonary  Negative pulmonary ROS ,        GI/Hepatic  Negative GI/hepatic ROS          Negative  ROS        Endo/Other  Negative endo/other ROS      GYN    Hysterectomy,        Hematology  Negative hematology ROS      Musculoskeletal  Negative musculoskeletal ROS        Neurology  Negative neurology ROS      Psychology   Negative psychology ROS            Physical Exam    Airway    Mallampati score: I  TM Distance: >3 FB  Neck ROM: full     Dental   No notable dental hx     Cardiovascular  Comment: Negative ROS, Cardiovascular exam normal    Pulmonary  Pulmonary exam normal     Other Findings      Lab Results   Component Value Date    WBC 7 78 10/29/2017    HGB 12 3 10/29/2017    HCT 36 0 10/29/2017    MCV 85 10/29/2017     10/29/2017     Lab Results   Component Value Date    GLUCOSE 111 10/29/2017    CALCIUM 8 6 10/29/2017     10/29/2017    K 3 9 10/29/2017    CO2 27 10/29/2017     10/29/2017    BUN 12 10/29/2017    CREATININE 0 75 10/29/2017     No results found for: INR, PROTIME  No results found for: PTT  Type and Screen:  O        Anesthesia Plan  ASA Score- 1       Anesthesia Type- general with ASA Monitors  Additional Monitors:   Airway Plan: LMA  Induction- intravenous  Informed Consent- Anesthetic plan and risks discussed with patient  I personally reviewed this patient with the CRNA  Discussed and agreed on the Anesthesia Plan with the CRNA  Say Lopes

## 2017-11-02 NOTE — PROGRESS NOTES
Assumed care of patient at this time  Report received from Nette Corbin in [address]  Patient in supine position with HOB elevated to 60 degrees  In NAD  Pt's  Moody Banuelos at bedside

## 2017-11-02 NOTE — OP NOTE
OPERATIVE REPORT  PATIENT NAME: Hannah Mark    :  1980  MRN: [id number]  Pt Location:  CYSTO ROOM 01    SURGERY DATE: 2017    Surgeon(s) and Role:     * Trey Casiano MD - Primary    Preop Diagnosis:  Other hydronephrosis [N13 39]    Post-Op Diagnosis Codes:     * Other hydronephrosis [N13 39]        Right distal ureteral stricture  Procedure(s) (LRB):  CYSTOSCOPY;  RIGHT URETEROSCOPY WITH  HOLMIUM LASER INCISION OF URETERAL STRICTURE; (WITH HYDROSTATIC RIGHT URETERAL DILATION), BILATERAL RETROGRADE PYELOGRAM AND INSERTION OF RIGHT URETERAL STENT X 2 (4 7 X 26) (Right)    Specimen(s):  ID Type Source Tests Collected by Time Destination   A :  Urine Urine, Cystoscopic URINE CULTURE Trey Casiano MD 2017 1044        Estimated Blood Loss:   Minimal    Drains:       Anesthesia Type:   General    Operative Indications: Other hydronephrosis [N13 39]  RIGHT    Operative Findings:  Right hydronephrosis with a distal ureteral stricture    Complications:   None    Procedure and Technique: This 17-year-old female had a hysterectomy earlier this year  After this she had dilatation of the right renal unit  A Stent was placed and subsequently removed  The stent had resolved the hydronephrosis at that time  She then apparently had another gyn procedure  It is noted that she did not return for urologic follow-up  She did present to the emergency room recently with right flank pain and was found to have a significant right hydroureteronephrosis without evidence of a stone  Now for definitive evaluation and management  She is brought to the operating room identified and transferred to the cysto table  General anesthesia was administered  Patient was then placed in lithotomy position with the genitalia were prepped with Betadine and she was draped  A confirmatory time-out was then performed  Cystoscopy then followed  The urethra was unremarkable  The ureteral orifices were unremarkable  There was no evidence of tumor , stone, or other abnormalities of the bladder  Bilateral retrogrades were then performed  The left renal unit essentially was unremarkable  There was mild dilatation of the left renal pelvis  5 minutes film showed good drainage  On the right side there was a hydronephrosis to the distal ureter roughly 2-3 cm from the right ureteral orifice  I could not pass the 5 [place] ureteral catheter beyond the stricture  I could then pass a guidewire up into the right kidney  This is confirmed by fluoroscopy  Attempt was then made to pass a hydrostatic balloon dilator in the short strictured area  However, this would not go beyond the strictured area  Therefore a holmium laser was used to incise the strictured area  Completing this the balloon dilator was again passed and was inflated and confirmed that the stricture had been treated  A right ureteroscope could then be placed and passed through this area without any difficulty  The ureter above this was unremarkable  It was elected to place 2 stents, both measuring 4 7 [place] Cara in diameter  However , I could not get a 2nd wire into the UPJ junction  Therefore the ureteroscope was passed to the level of the UPJ junction  There did not appear to be any obstruction such as a stone  It was suggested that there may be a mild kink in the ureter from the dilatation  I did pass the hydrostatic balloon in this area  Inflated the balloon  The 2nd wire could then passed through this area  I could in fact also pass the ureteroscope through this area as well into the kidney without difficulty  Thus with the 2nd wire in place 2 stents as mention were placed into the ureter to give maximum dilatation of this area and allow the incised stricture to heal   We will keep the stents for approximately 6 weeks  After removal, follow-up imaging studies will be obtained to ascertain relief of the obstruction  She tolerated this well    She was awakened without incident  She was discharged to PACU in good condition     I was present for the entire procedure    Patient Disposition:  PACU     SIGNATURE: Cassi Amin MD  DATE: November 2, 2017  TIME: 12:51 PM

## 2017-11-02 NOTE — DISCHARGE INSTRUCTIONS

## 2017-11-03 VITALS
OXYGEN SATURATION: 95 % | DIASTOLIC BLOOD PRESSURE: 61 MMHG | HEIGHT: 65 IN | WEIGHT: 130 LBS | SYSTOLIC BLOOD PRESSURE: 106 MMHG | BODY MASS INDEX: 21.66 KG/M2 | TEMPERATURE: 98.9 F | RESPIRATION RATE: 20 BRPM | HEART RATE: 101 BPM

## 2017-11-03 RX ORDER — KETOROLAC TROMETHAMINE 30 MG/ML
15 INJECTION, SOLUTION INTRAMUSCULAR; INTRAVENOUS EVERY 6 HOURS PRN
Status: DISCONTINUED | OUTPATIENT
Start: 2017-11-03 | End: 2017-11-03 | Stop reason: HOSPADM

## 2017-11-03 RX ORDER — CEFUROXIME AXETIL 500 MG/1
500 TABLET ORAL EVERY 12 HOURS SCHEDULED
Qty: 10 TABLET | Refills: 0 | Status: SHIPPED | OUTPATIENT
Start: 2017-11-03 | End: 2017-11-08

## 2017-11-03 RX ORDER — OXYBUTYNIN CHLORIDE 5 MG/1
5 TABLET ORAL 3 TIMES DAILY PRN
Qty: 30 TABLET | Refills: 0 | Status: SHIPPED | OUTPATIENT
Start: 2017-11-03 | End: 2018-01-05

## 2017-11-03 RX ORDER — ACETAMINOPHEN 325 MG/1
975 TABLET ORAL EVERY 8 HOURS SCHEDULED
Status: DISCONTINUED | OUTPATIENT
Start: 2017-11-03 | End: 2017-11-03 | Stop reason: HOSPADM

## 2017-11-03 RX ORDER — DOCUSATE SODIUM 100 MG/1
100 CAPSULE, LIQUID FILLED ORAL 2 TIMES DAILY
Status: DISCONTINUED | OUTPATIENT
Start: 2017-11-03 | End: 2017-11-03 | Stop reason: SDUPTHER

## 2017-11-03 RX ORDER — TRAMADOL HYDROCHLORIDE 50 MG/1
50 TABLET ORAL EVERY 6 HOURS PRN
Qty: 30 TABLET | Refills: 0 | Status: SHIPPED | OUTPATIENT
Start: 2017-11-03 | End: 2017-11-13

## 2017-11-03 RX ORDER — METOCLOPRAMIDE 10 MG/1
10 TABLET ORAL
Status: DISCONTINUED | OUTPATIENT
Start: 2017-11-03 | End: 2017-11-03 | Stop reason: HOSPADM

## 2017-11-03 RX ORDER — BISACODYL 10 MG
10 SUPPOSITORY, RECTAL RECTAL ONCE
Status: COMPLETED | OUTPATIENT
Start: 2017-11-03 | End: 2017-11-03

## 2017-11-03 RX ORDER — OXYCODONE HYDROCHLORIDE 5 MG/1
5 TABLET ORAL EVERY 4 HOURS PRN
Status: DISCONTINUED | OUTPATIENT
Start: 2017-11-03 | End: 2017-11-03 | Stop reason: HOSPADM

## 2017-11-03 RX ORDER — ONDANSETRON 4 MG/1
4 TABLET, ORALLY DISINTEGRATING ORAL EVERY 6 HOURS PRN
Status: DISCONTINUED | OUTPATIENT
Start: 2017-11-03 | End: 2017-11-03 | Stop reason: HOSPADM

## 2017-11-03 RX ADMIN — METOCLOPRAMIDE HYDROCHLORIDE 10 MG: 10 TABLET ORAL at 16:02

## 2017-11-03 RX ADMIN — OXYCODONE HYDROCHLORIDE 5 MG: 5 TABLET ORAL at 18:59

## 2017-11-03 RX ADMIN — OXYCODONE HYDROCHLORIDE AND ACETAMINOPHEN 1 TABLET: 5; 325 TABLET ORAL at 09:41

## 2017-11-03 RX ADMIN — DOCUSATE SODIUM 100 MG: 100 CAPSULE, LIQUID FILLED ORAL at 18:03

## 2017-11-03 RX ADMIN — OXYCODONE HYDROCHLORIDE 5 MG: 5 TABLET ORAL at 16:02

## 2017-11-03 RX ADMIN — BISACODYL 10 MG: 10 SUPPOSITORY RECTAL at 16:02

## 2017-11-03 RX ADMIN — DOCUSATE SODIUM 100 MG: 100 CAPSULE, LIQUID FILLED ORAL at 09:41

## 2017-11-03 RX ADMIN — ONDANSETRON 4 MG: 4 TABLET, ORALLY DISINTEGRATING ORAL at 13:44

## 2017-11-03 RX ADMIN — OXYCODONE HYDROCHLORIDE AND ACETAMINOPHEN 1 TABLET: 5; 325 TABLET ORAL at 01:11

## 2017-11-03 RX ADMIN — DEXTROSE, SODIUM CHLORIDE, AND POTASSIUM CHLORIDE 125 ML/HR: 5; .45; .15 INJECTION INTRAVENOUS at 05:40

## 2017-11-03 RX ADMIN — CEFAZOLIN SODIUM 1000 MG: 1 SOLUTION INTRAVENOUS at 04:50

## 2017-11-03 RX ADMIN — OXYCODONE HYDROCHLORIDE AND ACETAMINOPHEN 1 TABLET: 5; 325 TABLET ORAL at 13:44

## 2017-11-03 RX ADMIN — HYDROMORPHONE HYDROCHLORIDE 0.5 MG: 1 INJECTION, SOLUTION INTRAMUSCULAR; INTRAVENOUS; SUBCUTANEOUS at 05:18

## 2017-11-03 RX ADMIN — ACETAMINOPHEN 975 MG: 325 TABLET, FILM COATED ORAL at 16:01

## 2017-11-03 NOTE — PROGRESS NOTES
UROLOGY PROGRESS NOTE   Patient Identifiers: Sara Sheriff (MRN [id number])  Date of Service: 11/3/2017        Assessment:   Postop incision and dilation of distal right stricture  Placement of dual 4 7 French stents on the right side  Patient had significant pain and was placed on observation status overnight  She has improved and can now ambulate but still having significant pain  As long as pain control can be maintained she will be discharged to home care  Plan:   -  -okay for discharge this morning  Will continue on Ceftin 500 mg b i d , she has Percocet for pain control, will place her on Colace 100 mg b i d , and also oxybutynin 5 mg t i d  as needed for bladder spasticity related to the placement of stents  -  -        Subjective:     24 HR EVENTS:   no significant events  Patient has  complaints of She has complaints of lower abdominal discomfort and bloating         Objective:     VITALS:    Vitals:    11/03/17 0718   BP: 112/64   Pulse: 93   Resp: 18   Temp: 98 5 °F (36 9 °C)   SpO2: 100%       INS & OUTS:  [unfilled]    LABS:  Lab Results   Component Value Date    HGB 12 3 10/29/2017    HCT 36 0 10/29/2017    WBC 7 78 10/29/2017     10/29/2017   ]    Lab Results   Component Value Date     10/29/2017    K 3 9 10/29/2017     10/29/2017    CO2 27 10/29/2017    BUN 12 10/29/2017    CREATININE 0 75 10/29/2017    CALCIUM 8 6 10/29/2017    GLUCOSE 111 10/29/2017   ]    INPATIENT MEDS:    Current Facility-Administered Medications:     dextrose 5 % and sodium chloride 0 45 % with KCl 20 mEq/L infusion, 125 mL/hr, Intravenous, Continuous, Barry Andrade MD, Last Rate: 125 mL/hr at 11/03/17 0540, 125 mL/hr at 11/03/17 0540    docusate sodium (COLACE) capsule 100 mg, 100 mg, Oral, BID, Barry Andrade MD, 100 mg at 11/02/17 2114    HYDROmorphone (DILAUDID) 1 mg/mL injection 0 5 mg, 0 5 mg, Intravenous, Q2H PRN, Barry Andrade MD, 0 5 mg at 11/03/17 0518    ondansetron [facility] injection 4 mg, 4 mg, Intravenous, Q6H PRN, Sarah Villa MD    oxyCODONE-acetaminophen (PERCOCET) 5-325 mg per tablet 1 tablet, 1 tablet, Oral, Q4H PRN, Sarah Villa MD, 1 tablet at 11/03/17 0111      Physical Exam:   /64   Pulse 93   Temp 98 5 °F (36 9 °C) (Oral)   Resp 18   Ht 5' 5" (1 651 m)   Wt 59 kg (130 lb)   SpO2 100%   BMI 21 63 kg/m²   GEN: alert and oriented x 3    RESP: breathing comfortably with no accessory muscle use    ABD: soft, appropriately tender to palpation, non-distended      RADIOLOGY:   Refer to the retrograde films from November 2nd

## 2017-11-03 NOTE — PROGRESS NOTES
Called by nursing regarding uncontrolled pain , nausea and vomiting  Will cancel discharge at this time  Restart intervenous fluids  IV zofran and reglan  Acute pain service consult  Will re evaluate in the morning  Dr Suresh Wilkins will contact her  via telephone

## 2017-11-03 NOTE — PLAN OF CARE
GENITOURINARY - ADULT     Maintains or returns to baseline urinary function Progressing     Absence of urinary retention Progressing        METABOLIC, FLUID AND ELECTROLYTES - ADULT     Electrolytes maintained within normal limits Progressing        PAIN - ADULT     Verbalizes/displays adequate comfort level or baseline comfort level Progressing        SAFETY ADULT     Patient will remain free of falls Progressing

## 2017-11-03 NOTE — SOCIAL WORK
CM introduces and met with Pt  To discuss the CM role  Pt has no anticipated DC needs  Pt stated her  filled her prescriptions yesterday  Advised Pt if she has any needs arise to advise Cm  Pt verbalized understanding  CM reviewed d/c planning process including the following: identifying help at home, patient preference for d/c planning needs, Discharge Lounge, [facility] Meds to Bed program, availability of treatment team to discuss questions or concerns patient and/or family may have regarding understanding medications and recognizing signs and symptoms once discharged  CM also encouraged patient to follow up with all recommended appointments after discharge  Patient advised of importance for patient and family to participate in managing patients medical well being

## 2017-11-03 NOTE — CONSULTS
Consultation - Inpatient Pain Management   Eulalia Doll 42 y o  female MRN: [id number]  Unit/Bed#: [facility] 613-01 Encounter: [id number]               Assessment/Plan     Assessment:     Principal Problem:    Ureteral stricture, right      Plan/Recommendations:   Acute abdominal pain  · Tylenol 975mg Q8 hours  · D/C Percocet PRN  · Toradol 15mg IV Q6 hours scheduled; ok to to change to oral NSAID in next 24 hours to Ibuprofen 600mg Q6 hours  · Oxycodone 5mg Q4 hours PRN  · Oxybutynin 5mg BID for bladder spasms  · Continue IV Dilaudid for now; ok to discontinue once multimodal pain regimen is initiated and pain improves   · Add bowel regimen, no BM since Wednesday; constipation may be contributing to pain  Lieutenant [place] for discharge if pain once pain is controlled  Reviewed with Urology  History of Present Illness    Admit Date:  11/2/2017  Hospital Day:  0 days  Primary Service:  Surgery-General  Attending Provider:  Eris Garland MD  Physician Requesting Consult: Eris Garland MD  Reason for Consult / Principal Problem: acute abdominal pain  HPI: Eulalia Doll is a 42y o  year old female who is s/p CYSTOSCOPY;  RIGHT URETEROSCOPY WITH [address]; (WITH HYDROSTATIC RIGHT URETERAL DILATION), BILATERAL RETROGRADE PYELOGRAM AND INSERTION OF RIGHT URETERAL STENT X 2 on 11/2/17  Patient continues to report moderate to severe abdominal pain  IV Dilaudid causes drowsiness, Percocet provides mild pain relief  Pain is tolerable at rest and becomes mores severe with ambulation  No history or chronic pain or continuous opioid use  I have reviewed the patient's controlled substance dispensing history in the Prescription Drug Monitoring Program in compliance with the [facility] regulations before recommending any controlled substances       Review of Systems:  + abdominal pain   + nausea with increase pain  + constipation, LBM Wednesday    Pain History:  Current pain location(s): B/L abdominal pain  Pain Scale:   3-10  Severity:  Severe at times  Treatment History:  Patient sitting in chair  Continues to report severe pain post procedure; mild pain control with current pain regimen  Historical Information   Past Medical History:   Diagnosis Date    Migraine     PONV (postoperative nausea and vomiting)      Past Surgical History:   Procedure Laterality Date    APPENDECTOMY      CYSTOSCOPY W/ LASER LITHOTRIPSY Right 11/2/2017    Procedure: CYSTOSCOPY;  RIGHT URETEROSCOPY WITH  HOLMIUM LASER INCISION OF URETERAL STRICTURE; (WITH HYDROSTATIC RIGHT URETERAL DILATION), BILATERAL RETROGRADE PYELOGRAM AND INSERTION OF RIGHT URETERAL STENT X 2 (4 7 X 26); Surgeon: Baldwin Baumgarten, MD;  [facility];  Service: Urology    DILATION AND CURETTAGE OF UTERUS N/A 12/30/2016    Procedure: DILATATION AND CURETTAGE;  Surgeon: Andrés Rubio MD;  [facility];  Service:    Learta January DILATION AND EVACUATION      HYSTERECTOMY N/A 5/22/2017    Procedure: EXAM UNDER ANESTHESIA;  Surgeon: Andrés Rubio MD;  [facility];  Service:     HYSTEROSCOPY N/A 12/30/2016    Procedure: HYSTEROSCOPY;  Surgeon: Andrés Rubio MD;  [facility];  Service:    Learta January [place] CYSTOURETHROSCOPY,URETER CATHETER Bilateral 3/27/2017    Procedure: CYSTOSCOPY WITH RETROGRADE PYELOGRAM; RIGHT STENT INSERTION;  Surgeon: Baldwin Baumgarten, MD;  [facility];  Service: Urology    KS LAPAROSCOPY W TOT HYSTERECT UTERUS 250 GRAM OR LESS N/A 3/24/2017    Procedure: TOTAL LAPAROSCOPIC HYSTERECTOMY   bilateral salpingectomy, cysto; Surgeon: Andrés Rubio MD;  [facility];  Service: Gynecology     Social History   History   Alcohol Use    Yes     Comment: occasional     History   Drug Use No     History   Smoking Status    Former Smoker    Packs/day: 0 20    Types: Cigarettes    Quit date: 2007   Smokeless Tobacco    Never Used     Family History: History reviewed  No pertinent family history      Meds/Allergies Prior to Admission Medications  Prescriptions Prior to Admission   Medication    Ibuprofen (ADVIL) 200 MG CAPS    ferrous sulfate 325 (65 Fe) mg tablet    oxyCODONE-acetaminophen (PERCOCET) 5-325 mg per tablet     Hospital Medications  Current Facility-Administered Medications   Medication Dose Route Frequency    acetaminophen (TYLENOL) tablet 975 mg  975 mg Oral Q8H [address]    bisacodyl (DULCOLAX) rectal suppository 10 mg  10 mg Rectal Once    dextrose 5 % and sodium chloride 0 45 % with KCl 20 mEq/L infusion  125 mL/hr Intravenous Continuous    docusate sodium (COLACE) capsule 100 mg  100 mg Oral BID    docusate sodium (COLACE) capsule 100 mg  100 mg Oral BID    HYDROmorphone (DILAUDID) 1 mg/mL injection 0 5 mg  0 5 mg Intravenous Q2H PRN    ketorolac (TORADOL) 30 mg/mL injection 15 mg  15 mg Intravenous Q6H PRN    magnesium hydroxide (MILK OF MAGNESIA) 400 mg/5 mL oral suspension 30 mL  30 mL Oral Daily PRN    metoclopramide (REGLAN) tablet 10 mg  10 mg Oral TID AC    ondansetron (ZOFRAN) injection 4 mg  4 mg Intravenous Q6H PRN    ondansetron (ZOFRAN-ODT) dispersible tablet 4 mg  4 mg Oral Q6H PRN    oxyCODONE (ROXICODONE) IR tablet 5 mg  5 mg Oral Q4H PRN       Allergies   Allergen Reactions    Shellfish-Derived Products Anaphylaxis       Objective   Temp:  [97 9 °F (36 6 °C)-100 5 °F (38 1 °C)] 98 5 °F (36 9 °C)  HR:  [69-93] 93  Resp:  [14-18] 18  BP: ()/(58-90) 112/64    Intake/Output Summary (Last 24 hours) at 11/03/17 1503  Last data filed at 11/03/17 1401   Gross per 24 hour   Intake          1363 33 ml   Output             1560 ml   Net          -196 67 ml       Physical Exam:  General Appearance:    Alert, cooperative, no distress, appears stated age   Neurological:   Oriented to person, place, and time, normal affect    Head:    Normocephalic, without obvious abnormality, atraumatic   Eyes:    EOM's intact   Back:     ROM normal   Lungs:     Clear to auscultation bilaterally, respirations unlabored   Chest Wall:    No tenderness or deformity   Abdomen:        Soft, + distention or tenderness, bowel sounds present    Heart:    Regular rate and rhythm, S1 and S2 normal   Extremities:   Extremities intact sensation to light touch   Skin:   Skin color and texture normal, no rashes or lesions     Lab Results:   Results from last 7 days  Lab Units 10/29/17  0207   WBC Thousand/uL 7 78   HEMOGLOBIN g/dL 12 3   HEMATOCRIT % 36 0   PLATELETS Thousands/uL 172      Results from last 7 days  Lab Units 10/29/17  0207   SODIUM mmol/L 140   POTASSIUM mmol/L 3 9   CHLORIDE mmol/L 107   CO2 mmol/L 27   BUN mg/dL 12   CREATININE mg/dL 0 75   CALCIUM mg/dL 8 6   GLUCOSE RANDOM mg/dL 111       Imaging Studies: I have personally reviewed pertinent reports  Counseling / Coordination of Care  Total floor / unit time spent today 45 minutes  Greater than 50% of total time was spent with the patient and / or family counseling and / or coordination of care   A description of the counseling / coordination of care: Reviewed plan of care and medications with patient, RN staff and primary care team     Shaun Mercedes MS, RN-BC  Acute Pain

## 2017-11-04 LAB — BACTERIA UR CULT: NORMAL

## 2017-11-27 ENCOUNTER — HOSPITAL ENCOUNTER (OUTPATIENT)
Dept: RADIOLOGY | Age: 37
Discharge: HOME | End: 2017-11-27
Payer: COMMERCIAL

## 2017-11-27 DIAGNOSIS — N13.30 HYDRONEPHROSIS, UNSPECIFIED HYDRONEPHROSIS TYPE: ICD-10-CM

## 2017-11-27 DIAGNOSIS — N13.1 HYDRONEPHROSIS DUE TO URETERAL STRICTURE: ICD-10-CM

## 2017-11-27 PROCEDURE — 76770 US EXAM ABDO BACK WALL COMP: CPT

## 2018-01-02 ENCOUNTER — ALLSCRIPTS OFFICE VISIT (OUTPATIENT)
Dept: OTHER | Facility: OTHER | Age: 38
End: 2018-01-02

## 2018-01-02 ENCOUNTER — LAB REQUISITION (OUTPATIENT)
Dept: LAB | Facility: HOSPITAL | Age: 38
End: 2018-01-02
Payer: COMMERCIAL

## 2018-01-02 DIAGNOSIS — R39.9 UNSPECIFIED SYMPTOMS AND SIGNS INVOLVING THE GENITOURINARY SYSTEM: ICD-10-CM

## 2018-01-02 LAB
CLARITY UR: NORMAL
COLOR UR: NORMAL
GLUCOSE (HISTORICAL): NORMAL
HGB UR QL STRIP.AUTO: NORMAL
KETONES UR STRIP-MCNC: NORMAL MG/DL
LEUKOCYTE ESTERASE UR QL STRIP: NORMAL
NITRITE UR QL STRIP: NORMAL
PH UR STRIP.AUTO: 7 [PH]
PROT UR STRIP-MCNC: NORMAL MG/DL

## 2018-01-02 PROCEDURE — 87086 URINE CULTURE/COLONY COUNT: CPT | Performed by: UROLOGY

## 2018-01-03 LAB — BACTERIA UR CULT: NORMAL

## 2018-01-05 RX ORDER — DIPHENOXYLATE HYDROCHLORIDE AND ATROPINE SULFATE 2.5; .025 MG/1; MG/1
1 TABLET ORAL DAILY
COMMUNITY
End: 2018-02-25 | Stop reason: HOSPADM

## 2018-01-05 NOTE — PRE-PROCEDURE INSTRUCTIONS
Pre-Surgery Instructions:   Medication Instructions    multivitamin (THERAGRAN) TABS Instructed patient per Anesthesia Guidelines  REVIEWED  PRINTED SURGICAL INSTRUCTIONS WITH PATIENT , PATIENT VERBALIZED UNDERSTANDING   MEDICATIONS REVIEWED

## 2018-01-11 ENCOUNTER — ANESTHESIA (OUTPATIENT)
Dept: PERIOP | Facility: HOSPITAL | Age: 38
End: 2018-01-11
Payer: COMMERCIAL

## 2018-01-11 ENCOUNTER — APPOINTMENT (OUTPATIENT)
Dept: RADIOLOGY | Facility: HOSPITAL | Age: 38
End: 2018-01-11
Payer: COMMERCIAL

## 2018-01-11 ENCOUNTER — ANESTHESIA EVENT (OUTPATIENT)
Dept: PERIOP | Facility: HOSPITAL | Age: 38
End: 2018-01-11
Payer: COMMERCIAL

## 2018-01-11 ENCOUNTER — HOSPITAL ENCOUNTER (OUTPATIENT)
Facility: HOSPITAL | Age: 38
Setting detail: OUTPATIENT SURGERY
Discharge: HOME | End: 2018-01-11
Attending: UROLOGY | Admitting: UROLOGY
Payer: COMMERCIAL

## 2018-01-11 VITALS
WEIGHT: 130 LBS | SYSTOLIC BLOOD PRESSURE: 113 MMHG | TEMPERATURE: 99.2 F | HEIGHT: 65 IN | BODY MASS INDEX: 21.66 KG/M2 | HEART RATE: 67 BPM | DIASTOLIC BLOOD PRESSURE: 73 MMHG | OXYGEN SATURATION: 97 % | RESPIRATION RATE: 18 BRPM

## 2018-01-11 DIAGNOSIS — N13.2 HYDRONEPHROSIS WITH RENAL AND URETERAL CALCULUS OBSTRUCTION: ICD-10-CM

## 2018-01-11 PROCEDURE — C1769 GUIDE WIRE: HCPCS | Performed by: UROLOGY

## 2018-01-11 PROCEDURE — 87086 URINE CULTURE/COLONY COUNT: CPT | Performed by: UROLOGY

## 2018-01-11 PROCEDURE — 74420 UROGRAPHY RTRGR +-KUB: CPT

## 2018-01-11 RX ORDER — SODIUM CHLORIDE, SODIUM LACTATE, POTASSIUM CHLORIDE, CALCIUM CHLORIDE 600; 310; 30; 20 MG/100ML; MG/100ML; MG/100ML; MG/100ML
125 INJECTION, SOLUTION INTRAVENOUS CONTINUOUS
Status: DISCONTINUED | OUTPATIENT
Start: 2018-01-11 | End: 2018-01-11 | Stop reason: HOSPADM

## 2018-01-11 RX ORDER — ONDANSETRON 2 MG/ML
INJECTION INTRAMUSCULAR; INTRAVENOUS AS NEEDED
Status: DISCONTINUED | OUTPATIENT
Start: 2018-01-11 | End: 2018-01-11 | Stop reason: SURG

## 2018-01-11 RX ORDER — DIPHENHYDRAMINE HYDROCHLORIDE 50 MG/ML
INJECTION INTRAMUSCULAR; INTRAVENOUS AS NEEDED
Status: DISCONTINUED | OUTPATIENT
Start: 2018-01-11 | End: 2018-01-11 | Stop reason: SURG

## 2018-01-11 RX ORDER — LIDOCAINE HYDROCHLORIDE 10 MG/ML
INJECTION, SOLUTION INFILTRATION; PERINEURAL AS NEEDED
Status: DISCONTINUED | OUTPATIENT
Start: 2018-01-11 | End: 2018-01-11 | Stop reason: SURG

## 2018-01-11 RX ORDER — MIDAZOLAM HYDROCHLORIDE 1 MG/ML
INJECTION INTRAMUSCULAR; INTRAVENOUS AS NEEDED
Status: DISCONTINUED | OUTPATIENT
Start: 2018-01-11 | End: 2018-01-11 | Stop reason: SURG

## 2018-01-11 RX ORDER — METOCLOPRAMIDE HYDROCHLORIDE 5 MG/ML
10 INJECTION INTRAMUSCULAR; INTRAVENOUS ONCE AS NEEDED
Status: DISCONTINUED | OUTPATIENT
Start: 2018-01-11 | End: 2018-01-11 | Stop reason: HOSPADM

## 2018-01-11 RX ORDER — FENTANYL CITRATE/PF 50 MCG/ML
25 SYRINGE (ML) INJECTION
Status: DISCONTINUED | OUTPATIENT
Start: 2018-01-11 | End: 2018-01-11 | Stop reason: HOSPADM

## 2018-01-11 RX ORDER — ONDANSETRON 2 MG/ML
4 INJECTION INTRAMUSCULAR; INTRAVENOUS EVERY 6 HOURS PRN
Status: DISCONTINUED | OUTPATIENT
Start: 2018-01-11 | End: 2018-01-11 | Stop reason: HOSPADM

## 2018-01-11 RX ORDER — FENTANYL CITRATE 50 UG/ML
INJECTION, SOLUTION INTRAMUSCULAR; INTRAVENOUS AS NEEDED
Status: DISCONTINUED | OUTPATIENT
Start: 2018-01-11 | End: 2018-01-11 | Stop reason: SURG

## 2018-01-11 RX ORDER — PROPOFOL 10 MG/ML
INJECTION, EMULSION INTRAVENOUS AS NEEDED
Status: DISCONTINUED | OUTPATIENT
Start: 2018-01-11 | End: 2018-01-11 | Stop reason: SURG

## 2018-01-11 RX ORDER — MAGNESIUM HYDROXIDE 1200 MG/15ML
LIQUID ORAL AS NEEDED
Status: DISCONTINUED | OUTPATIENT
Start: 2018-01-11 | End: 2018-01-11 | Stop reason: HOSPADM

## 2018-01-11 RX ORDER — OXYCODONE HYDROCHLORIDE AND ACETAMINOPHEN 5; 325 MG/1; MG/1
1 TABLET ORAL EVERY 4 HOURS PRN
Status: DISCONTINUED | OUTPATIENT
Start: 2018-01-11 | End: 2018-01-11 | Stop reason: HOSPADM

## 2018-01-11 RX ORDER — OXYCODONE HYDROCHLORIDE AND ACETAMINOPHEN 5; 325 MG/1; MG/1
1 TABLET ORAL EVERY 4 HOURS PRN
Qty: 18 TABLET | Refills: 0 | Status: SHIPPED | OUTPATIENT
Start: 2018-01-11 | End: 2018-01-16

## 2018-01-11 RX ORDER — SODIUM CHLORIDE, SODIUM LACTATE, POTASSIUM CHLORIDE, CALCIUM CHLORIDE 600; 310; 30; 20 MG/100ML; MG/100ML; MG/100ML; MG/100ML
20 INJECTION, SOLUTION INTRAVENOUS CONTINUOUS
Status: DISCONTINUED | OUTPATIENT
Start: 2018-01-11 | End: 2018-01-11 | Stop reason: HOSPADM

## 2018-01-11 RX ORDER — ONDANSETRON 2 MG/ML
4 INJECTION INTRAMUSCULAR; INTRAVENOUS ONCE AS NEEDED
Status: DISCONTINUED | OUTPATIENT
Start: 2018-01-11 | End: 2018-01-11 | Stop reason: HOSPADM

## 2018-01-11 RX ADMIN — SODIUM CHLORIDE, SODIUM LACTATE, POTASSIUM CHLORIDE, AND CALCIUM CHLORIDE 20 ML/HR: .6; .31; .03; .02 INJECTION, SOLUTION INTRAVENOUS at 10:40

## 2018-01-11 RX ADMIN — DEXAMETHASONE SODIUM PHOSPHATE 10 MG: 10 INJECTION INTRAMUSCULAR; INTRAVENOUS at 12:06

## 2018-01-11 RX ADMIN — SODIUM CHLORIDE, SODIUM LACTATE, POTASSIUM CHLORIDE, AND CALCIUM CHLORIDE 125 ML/HR: .6; .31; .03; .02 INJECTION, SOLUTION INTRAVENOUS at 13:39

## 2018-01-11 RX ADMIN — LIDOCAINE HYDROCHLORIDE 50 MG: 10 INJECTION, SOLUTION INFILTRATION; PERINEURAL at 12:00

## 2018-01-11 RX ADMIN — MIDAZOLAM HYDROCHLORIDE 2 MG: 1 INJECTION, SOLUTION INTRAMUSCULAR; INTRAVENOUS at 11:53

## 2018-01-11 RX ADMIN — PROPOFOL 50 MG: 10 INJECTION, EMULSION INTRAVENOUS at 12:02

## 2018-01-11 RX ADMIN — ONDANSETRON 4 MG: 2 INJECTION INTRAMUSCULAR; INTRAVENOUS at 12:06

## 2018-01-11 RX ADMIN — CEFAZOLIN SODIUM 2000 MG: 2 SOLUTION INTRAVENOUS at 11:56

## 2018-01-11 RX ADMIN — PROPOFOL 200 MG: 10 INJECTION, EMULSION INTRAVENOUS at 12:00

## 2018-01-11 RX ADMIN — FENTANYL CITRATE 50 MCG: 50 INJECTION, SOLUTION INTRAMUSCULAR; INTRAVENOUS at 12:00

## 2018-01-11 RX ADMIN — DIPHENHYDRAMINE HYDROCHLORIDE 12.5 MG: 50 INJECTION, SOLUTION INTRAMUSCULAR; INTRAVENOUS at 12:06

## 2018-01-11 RX ADMIN — PROPOFOL 50 MG: 10 INJECTION, EMULSION INTRAVENOUS at 12:01

## 2018-01-11 NOTE — DISCHARGE INSTRUCTIONS
Ureteral Stent Placement   WHAT YOU NEED TO KNOW:   Ureteral stent placement is a procedure to open a blocked or narrow ureter  The ureter is the tube that carries urine from your kidney into your bladder  A stent is a thin hollow plastic tube used to hold your ureter open and allow urine to flow  The stent may stay in for several weeks  DISCHARGE INSTRUCTIONS:   Medicines:   · Pain medicine  may be given to take away or decrease pain  Do not wait until the pain is severe before you take your medicine  · Antibiotics  help prevent infections  Your healthcare provider may prescribe these for you while your stent remains in  · Take your medicine as directed  Contact your healthcare provider if you think your medicine is not helping or if you have side effects  Tell him or her if you are allergic to any medicine  Keep a list of the medicines, vitamins, and herbs you take  Include the amounts, and when and why you take them  Bring the list or the pill bottles to follow-up visits  Carry your medicine list with you in case of an emergency  Follow up with your urologist as directed: You will need regular follow-up visits with your urologist as long as the stent remains in  He will check to make sure the stent is working properly  He may do urine cultures to check for infection  Write down your questions so you remember to ask them during your visits  Self-care:   · Drink liquids  as directed  Ask your healthcare provider how much liquid to drink each day and which liquids are best for you  Fluids such as cranberry or apple juice may be especially helpful to prevent urinary infections  · Return to normal activities  the day after your stent placement or as directed by your healthcare provider  · You may take a shower  the day after your stent placement if your healthcare provider says it is okay  Contact your healthcare provider or urologist if:   · You have a fever or chills      · You feel like you need to urinate often  · You have pain when you urinate or pain around your bladder or kidney  · You see blood in your urine or it looks cloudy  · You have questions or concerns about your condition or care  Seek care immediately or call 911 if:   · You urinate little or not at all  · You have severe pain in your abdomen  © 2017 [employer] Information is for End User's use only and may not be sold, redistributed or otherwise used for commercial purposes  All illustrations and images included in CareNotes® are the copyrighted property of [employer]  or [employer] Leonardo  The above information is an  only  It is not intended as medical advice for individual conditions or treatments  Talk to your doctor, nurse or pharmacist before following any medical regimen to see if it is safe and effective for you

## 2018-01-11 NOTE — OP NOTE
OPERATIVE REPORT  PATIENT NAME: Renetta Guaman    :  1980  MRN: [id number]  Pt Location:  CYSTO ROOM 01    SURGERY DATE: 2018    Surgeon(s) and Role:     * All Kingston MD - Primary    Preop Diagnosis:  Hydronephrosis with renal and ureteral calculus obstruction [N13 2] RIGHT    Post-Op Diagnosis Codes: * Hydronephrosis with renal and ureteral calculus obstruction [N13 2]    Procedure(s) (LRB):  CYSTOSCOPY , BILATERAL RETROGRADE PYELOGRAM WITH RIGHT STENT EXTRACTION (N/A)    Specimen(s):  ID Type Source Tests Collected by Time Destination   A :  Urine Urine, Cystoscopic URINE CULTURE All Kingston MD 2018 1211        Estimated Blood Loss:   Minimal    Drains:       Anesthesia Type:   General    Operative Indications:  Hydronephrosis with renal and ureteral calculus obstruction [N13 2]  right    Operative Findings:  Persistent dilation of the right ureter and renal unit    Complications:   None    Procedure and Technique: This patient is a follow-up ureteral obstruction secondary to stricture  She had undergone hydrostatic dilatation and laser incision of the strictured site  She had been stented for approximately 10 weeks  She is brought to the operating room, identified, and transferred to the OR table  General anesthesia was then administered  She was placed in lithotomy position and the genitalia prepped with Betadine and she is draped  A confirmatory time-out was then performed  Cystoscopy was then begun  Culture specimen obtained  The bladder was inspected  There was prominent papillary urethritis  The bladder itself  was unremarkable  Two stents were seen protruding from the right orifice  They were grasped and removed without incident  Bilateral retrogrades were then obtained  There was no abnormality on the left side  On the right side there was persistent dilatation of the entire ureter and it did narrow at the site of the previous stricture    This patient had hydronephrotic changes noted preoperatively even with the both stents in position  After several minutes the right side did not show significant change  We will recheck her clinically for symptoms and also imaging study in the near future  She may require a Mag 3 scan and if necessary will reinsert a stent and repair with an open procedure   I was present for the entire procedure    Patient Disposition:  PACU     SIGNATURE: Bernadine Franks MD  DATE: January 11, 2018  TIME: 12:31 PM

## 2018-01-11 NOTE — PERIOPERATIVE NURSING NOTE
Patient ambulated to Bathroom with assistance void moderate amt of clear pink tinged urine     Patient returned to PACU to await [facility] bed

## 2018-01-11 NOTE — ANESTHESIA POSTPROCEDURE EVALUATION
Post-Op Assessment Note      CV Status:  Stable    Mental Status:  Alert and awake    Hydration Status:  Euvolemic    PONV Controlled:  Controlled    Airway Patency:  Patent    Post Op Vitals Reviewed: Yes          Staff: CRNA, Anesthesiologist           BP   108/68   Temp 98 1 °F (36 7 °C) (01/11/18 1247)    Pulse 63 (01/11/18 1247)   Resp   12   SpO2   100

## 2018-01-11 NOTE — ANESTHESIA PREPROCEDURE EVALUATION
Review of Systems/Medical History      History of anesthetic complications PONV    Cardiovascular   Pulmonary       GI/Hepatic            Endo/Other     GYN       Hematology   Musculoskeletal       Neurology    Headaches,    Psychology           Physical Exam    Airway    Mallampati score: II         Dental   No notable dental hx     Cardiovascular      Pulmonary      Other Findings        Anesthesia Plan  ASA Score- 1     Anesthesia Type- general with ASA Monitors  Additional Monitors:   Airway Plan: LMA  Comment: I, Dr Sena Barrera, the attending physician, have personally seen and evaluated the patient prior to anesthetic care  I have reviewed the pre-anesthetic record, and other medical records if appropriate to the anesthetic care  If a CRNA is involved in the case, I have reviewed the CRNA assessment, if present, and agree  The patient is in a suitable condition to proceed with my formulated anesthetic plan        Plan Factors-    Induction- intravenous  Postoperative Plan-     Informed Consent- Anesthetic plan and risks discussed with patient  I personally reviewed this patient with the CRNA  Discussed and agreed on the Anesthesia Plan with the CRNA  Mendoza Siegel

## 2018-01-13 LAB — BACTERIA UR CULT: NORMAL

## 2018-01-14 ENCOUNTER — HOSPITAL ENCOUNTER (INPATIENT)
Facility: HOSPITAL | Age: 38
LOS: 1 days | Discharge: HOME | DRG: 694 | End: 2018-01-15
Attending: EMERGENCY MEDICINE | Admitting: INTERNAL MEDICINE
Payer: COMMERCIAL

## 2018-01-14 ENCOUNTER — APPOINTMENT (EMERGENCY)
Dept: RADIOLOGY | Facility: HOSPITAL | Age: 38
DRG: 694 | End: 2018-01-14
Payer: COMMERCIAL

## 2018-01-14 DIAGNOSIS — N13.30 HYDRONEPHROSIS, UNSPECIFIED HYDRONEPHROSIS TYPE: ICD-10-CM

## 2018-01-14 DIAGNOSIS — R52 INTRACTABLE PAIN: ICD-10-CM

## 2018-01-14 DIAGNOSIS — N13.5 URETERAL STRICTURE, RIGHT: Primary | ICD-10-CM

## 2018-01-14 PROBLEM — M54.9 CVA TENDERNESS: Status: ACTIVE | Noted: 2018-01-14

## 2018-01-14 LAB
ANION GAP SERPL CALCULATED.3IONS-SCNC: 6 MMOL/L (ref 4–13)
BACTERIA UR QL AUTO: ABNORMAL /HPF
BASOPHILS # BLD AUTO: 0.03 THOUSANDS/ΜL (ref 0–0.1)
BASOPHILS NFR BLD AUTO: 0 % (ref 0–1)
BILIRUB UR QL STRIP: NEGATIVE
BUN SERPL-MCNC: 15 MG/DL (ref 5–25)
CALCIUM SERPL-MCNC: 8.9 MG/DL (ref 8.3–10.1)
CHLORIDE SERPL-SCNC: 105 MMOL/L (ref 100–108)
CLARITY UR: CLEAR
CO2 SERPL-SCNC: 27 MMOL/L (ref 21–32)
COLOR UR: YELLOW
COLOR, POC: NORMAL
CREAT SERPL-MCNC: 0.86 MG/DL (ref 0.6–1.3)
EOSINOPHIL # BLD AUTO: 0.09 THOUSAND/ΜL (ref 0–0.61)
EOSINOPHIL NFR BLD AUTO: 1 % (ref 0–6)
ERYTHROCYTE [DISTWIDTH] IN BLOOD BY AUTOMATED COUNT: 12.5 % (ref 11.6–15.1)
EXT PREG TEST URINE: NORMAL
GFR SERPL CREATININE-BSD FRML MDRD: 85 ML/MIN/1.73SQ M
GLUCOSE SERPL-MCNC: 110 MG/DL (ref 65–140)
GLUCOSE UR STRIP-MCNC: NEGATIVE MG/DL
HCT VFR BLD AUTO: 38.7 % (ref 34.8–46.1)
HGB BLD-MCNC: 13.2 G/DL (ref 11.5–15.4)
HGB UR QL STRIP.AUTO: ABNORMAL
HYALINE CASTS #/AREA URNS LPF: ABNORMAL /LPF
KETONES UR STRIP-MCNC: ABNORMAL MG/DL
LEUKOCYTE ESTERASE UR QL STRIP: NEGATIVE
LYMPHOCYTES # BLD AUTO: 1.27 THOUSANDS/ΜL (ref 0.6–4.47)
LYMPHOCYTES NFR BLD AUTO: 12 % (ref 14–44)
MCH RBC QN AUTO: 29.5 PG (ref 26.8–34.3)
MCHC RBC AUTO-ENTMCNC: 34.1 G/DL (ref 31.4–37.4)
MCV RBC AUTO: 87 FL (ref 82–98)
MONOCYTES # BLD AUTO: 0.65 THOUSAND/ΜL (ref 0.17–1.22)
MONOCYTES NFR BLD AUTO: 6 % (ref 4–12)
NEUTROPHILS # BLD AUTO: 8.39 THOUSANDS/ΜL (ref 1.85–7.62)
NEUTS SEG NFR BLD AUTO: 81 % (ref 43–75)
NITRITE UR QL STRIP: NEGATIVE
NON-SQ EPI CELLS URNS QL MICRO: ABNORMAL /HPF
NRBC BLD AUTO-RTO: 0 /100 WBCS
PH UR STRIP.AUTO: 8.5 [PH] (ref 4.5–8)
PLATELET # BLD AUTO: 193 THOUSANDS/UL (ref 149–390)
PMV BLD AUTO: 10.9 FL (ref 8.9–12.7)
POTASSIUM SERPL-SCNC: 4 MMOL/L (ref 3.5–5.3)
PROT UR STRIP-MCNC: ABNORMAL MG/DL
RBC # BLD AUTO: 4.47 MILLION/UL (ref 3.81–5.12)
RBC #/AREA URNS AUTO: ABNORMAL /HPF
SODIUM SERPL-SCNC: 138 MMOL/L (ref 136–145)
SP GR UR STRIP.AUTO: 1.01 (ref 1–1.03)
UROBILINOGEN UR QL STRIP.AUTO: 0.2 E.U./DL
WBC # BLD AUTO: 10.46 THOUSAND/UL (ref 4.31–10.16)
WBC #/AREA URNS AUTO: ABNORMAL /HPF

## 2018-01-14 PROCEDURE — 81002 URINALYSIS NONAUTO W/O SCOPE: CPT | Performed by: EMERGENCY MEDICINE

## 2018-01-14 PROCEDURE — 36415 COLL VENOUS BLD VENIPUNCTURE: CPT | Performed by: EMERGENCY MEDICINE

## 2018-01-14 PROCEDURE — 96375 TX/PRO/DX INJ NEW DRUG ADDON: CPT

## 2018-01-14 PROCEDURE — 96376 TX/PRO/DX INJ SAME DRUG ADON: CPT

## 2018-01-14 PROCEDURE — 85025 COMPLETE CBC W/AUTO DIFF WBC: CPT | Performed by: EMERGENCY MEDICINE

## 2018-01-14 PROCEDURE — 81001 URINALYSIS AUTO W/SCOPE: CPT

## 2018-01-14 PROCEDURE — 96361 HYDRATE IV INFUSION ADD-ON: CPT

## 2018-01-14 PROCEDURE — 80048 BASIC METABOLIC PNL TOTAL CA: CPT | Performed by: EMERGENCY MEDICINE

## 2018-01-14 PROCEDURE — 74176 CT ABD & PELVIS W/O CONTRAST: CPT

## 2018-01-14 PROCEDURE — 81025 URINE PREGNANCY TEST: CPT | Performed by: EMERGENCY MEDICINE

## 2018-01-14 PROCEDURE — 96374 THER/PROPH/DIAG INJ IV PUSH: CPT

## 2018-01-14 RX ORDER — ONDANSETRON 2 MG/ML
4 INJECTION INTRAMUSCULAR; INTRAVENOUS ONCE
Status: COMPLETED | OUTPATIENT
Start: 2018-01-14 | End: 2018-01-14

## 2018-01-14 RX ORDER — OXYCODONE HYDROCHLORIDE AND ACETAMINOPHEN 5; 325 MG/1; MG/1
2 TABLET ORAL ONCE
Status: COMPLETED | OUTPATIENT
Start: 2018-01-14 | End: 2018-01-15

## 2018-01-14 RX ORDER — KETOROLAC TROMETHAMINE 30 MG/ML
15 INJECTION, SOLUTION INTRAMUSCULAR; INTRAVENOUS ONCE
Status: COMPLETED | OUTPATIENT
Start: 2018-01-14 | End: 2018-01-15

## 2018-01-14 RX ADMIN — HYDROMORPHONE HYDROCHLORIDE 1 MG: 1 INJECTION, SOLUTION INTRAMUSCULAR; INTRAVENOUS; SUBCUTANEOUS at 22:06

## 2018-01-14 RX ADMIN — ONDANSETRON 4 MG: 2 INJECTION INTRAMUSCULAR; INTRAVENOUS at 21:21

## 2018-01-14 RX ADMIN — HYDROMORPHONE HYDROCHLORIDE 1 MG: 1 INJECTION, SOLUTION INTRAMUSCULAR; INTRAVENOUS; SUBCUTANEOUS at 21:20

## 2018-01-14 RX ADMIN — SODIUM CHLORIDE 1000 ML: 0.9 INJECTION, SOLUTION INTRAVENOUS at 21:21

## 2018-01-15 ENCOUNTER — ANESTHESIA EVENT (INPATIENT)
Dept: PERIOP | Facility: HOSPITAL | Age: 38
DRG: 694 | End: 2018-01-15
Payer: COMMERCIAL

## 2018-01-15 ENCOUNTER — APPOINTMENT (INPATIENT)
Dept: RADIOLOGY | Facility: HOSPITAL | Age: 38
DRG: 694 | End: 2018-01-15
Payer: COMMERCIAL

## 2018-01-15 ENCOUNTER — ANESTHESIA (INPATIENT)
Dept: PERIOP | Facility: HOSPITAL | Age: 38
DRG: 694 | End: 2018-01-15
Payer: COMMERCIAL

## 2018-01-15 VITALS
WEIGHT: 132.5 LBS | TEMPERATURE: 97.5 F | HEART RATE: 63 BPM | RESPIRATION RATE: 20 BRPM | OXYGEN SATURATION: 100 % | SYSTOLIC BLOOD PRESSURE: 100 MMHG | BODY MASS INDEX: 22.08 KG/M2 | DIASTOLIC BLOOD PRESSURE: 61 MMHG | HEIGHT: 65 IN

## 2018-01-15 PROBLEM — N13.30 HYDRONEPHROSIS: Status: ACTIVE | Noted: 2018-01-14

## 2018-01-15 LAB
ANION GAP SERPL CALCULATED.3IONS-SCNC: 5 MMOL/L (ref 4–13)
BUN SERPL-MCNC: 13 MG/DL (ref 5–25)
CALCIUM SERPL-MCNC: 8.3 MG/DL (ref 8.3–10.1)
CHLORIDE SERPL-SCNC: 106 MMOL/L (ref 100–108)
CO2 SERPL-SCNC: 28 MMOL/L (ref 21–32)
CREAT SERPL-MCNC: 0.82 MG/DL (ref 0.6–1.3)
ERYTHROCYTE [DISTWIDTH] IN BLOOD BY AUTOMATED COUNT: 12.6 % (ref 11.6–15.1)
GFR SERPL CREATININE-BSD FRML MDRD: 90 ML/MIN/1.73SQ M
GLUCOSE SERPL-MCNC: 103 MG/DL (ref 65–140)
HCT VFR BLD AUTO: 37.1 % (ref 34.8–46.1)
HGB BLD-MCNC: 12.2 G/DL (ref 11.5–15.4)
MCH RBC QN AUTO: 28.9 PG (ref 26.8–34.3)
MCHC RBC AUTO-ENTMCNC: 32.9 G/DL (ref 31.4–37.4)
MCV RBC AUTO: 88 FL (ref 82–98)
PLATELET # BLD AUTO: 182 THOUSANDS/UL (ref 149–390)
PMV BLD AUTO: 10.8 FL (ref 8.9–12.7)
POTASSIUM SERPL-SCNC: 4.2 MMOL/L (ref 3.5–5.3)
RBC # BLD AUTO: 4.22 MILLION/UL (ref 3.81–5.12)
SODIUM SERPL-SCNC: 139 MMOL/L (ref 136–145)
WBC # BLD AUTO: 9.57 THOUSAND/UL (ref 4.31–10.16)

## 2018-01-15 PROCEDURE — C2617 STENT, NON-COR, TEM W/O DEL: HCPCS | Performed by: UROLOGY

## 2018-01-15 PROCEDURE — 99285 EMERGENCY DEPT VISIT HI MDM: CPT

## 2018-01-15 PROCEDURE — BT1BYZZ FLUOROSCOPY OF BLADDER AND URETHRA USING OTHER CONTRAST: ICD-10-PCS | Performed by: UROLOGY

## 2018-01-15 PROCEDURE — 36415 COLL VENOUS BLD VENIPUNCTURE: CPT | Performed by: INTERNAL MEDICINE

## 2018-01-15 PROCEDURE — 80048 BASIC METABOLIC PNL TOTAL CA: CPT | Performed by: INTERNAL MEDICINE

## 2018-01-15 PROCEDURE — C1769 GUIDE WIRE: HCPCS | Performed by: UROLOGY

## 2018-01-15 PROCEDURE — 0T768DZ DILATION OF RIGHT URETER WITH INTRALUMINAL DEVICE, VIA NATURAL OR ARTIFICIAL OPENING ENDOSCOPIC: ICD-10-PCS | Performed by: UROLOGY

## 2018-01-15 PROCEDURE — 74420 UROGRAPHY RTRGR +-KUB: CPT

## 2018-01-15 PROCEDURE — 87086 URINE CULTURE/COLONY COUNT: CPT | Performed by: UROLOGY

## 2018-01-15 PROCEDURE — 85027 COMPLETE CBC AUTOMATED: CPT | Performed by: INTERNAL MEDICINE

## 2018-01-15 DEVICE — STENT URET DBL PIGTAIL MULTI 7FR 22-32CML SOFT
Type: IMPLANTABLE DEVICE | Site: URETER | Status: NON-FUNCTIONAL
Removed: 2018-02-22

## 2018-01-15 RX ORDER — SODIUM CHLORIDE 9 MG/ML
100 INJECTION, SOLUTION INTRAVENOUS CONTINUOUS
Status: DISCONTINUED | OUTPATIENT
Start: 2018-01-15 | End: 2018-01-15 | Stop reason: HOSPADM

## 2018-01-15 RX ORDER — MAGNESIUM HYDROXIDE/ALUMINUM HYDROXICE/SIMETHICONE 120; 1200; 1200 MG/30ML; MG/30ML; MG/30ML
30 SUSPENSION ORAL EVERY 6 HOURS PRN
Status: DISCONTINUED | OUTPATIENT
Start: 2018-01-15 | End: 2018-01-15

## 2018-01-15 RX ORDER — ACETAMINOPHEN 325 MG/1
650 TABLET ORAL EVERY 6 HOURS PRN
Status: DISCONTINUED | OUTPATIENT
Start: 2018-01-15 | End: 2018-01-15

## 2018-01-15 RX ORDER — PROPOFOL 10 MG/ML
INJECTION, EMULSION INTRAVENOUS AS NEEDED
Status: DISCONTINUED | OUTPATIENT
Start: 2018-01-15 | End: 2018-01-15 | Stop reason: SURG

## 2018-01-15 RX ORDER — FENTANYL CITRATE/PF 50 MCG/ML
50 SYRINGE (ML) INJECTION
Status: DISCONTINUED | OUTPATIENT
Start: 2018-01-15 | End: 2018-01-15 | Stop reason: HOSPADM

## 2018-01-15 RX ORDER — OXYCODONE HYDROCHLORIDE AND ACETAMINOPHEN 5; 325 MG/1; MG/1
1 TABLET ORAL EVERY 4 HOURS PRN
Status: DISCONTINUED | OUTPATIENT
Start: 2018-01-15 | End: 2018-01-15 | Stop reason: HOSPADM

## 2018-01-15 RX ORDER — MIDAZOLAM HYDROCHLORIDE 1 MG/ML
INJECTION INTRAMUSCULAR; INTRAVENOUS AS NEEDED
Status: DISCONTINUED | OUTPATIENT
Start: 2018-01-15 | End: 2018-01-15 | Stop reason: SURG

## 2018-01-15 RX ORDER — LIDOCAINE HYDROCHLORIDE 10 MG/ML
INJECTION, SOLUTION INFILTRATION; PERINEURAL AS NEEDED
Status: DISCONTINUED | OUTPATIENT
Start: 2018-01-15 | End: 2018-01-15 | Stop reason: SURG

## 2018-01-15 RX ORDER — SUCCINYLCHOLINE CHLORIDE 20 MG/ML
INJECTION INTRAMUSCULAR; INTRAVENOUS AS NEEDED
Status: DISCONTINUED | OUTPATIENT
Start: 2018-01-15 | End: 2018-01-15 | Stop reason: SURG

## 2018-01-15 RX ORDER — FENTANYL CITRATE 50 UG/ML
INJECTION, SOLUTION INTRAMUSCULAR; INTRAVENOUS AS NEEDED
Status: DISCONTINUED | OUTPATIENT
Start: 2018-01-15 | End: 2018-01-15 | Stop reason: SURG

## 2018-01-15 RX ORDER — PROMETHAZINE HYDROCHLORIDE 25 MG/ML
12.5 INJECTION, SOLUTION INTRAMUSCULAR; INTRAVENOUS EVERY 6 HOURS PRN
Status: DISCONTINUED | OUTPATIENT
Start: 2018-01-15 | End: 2018-01-15

## 2018-01-15 RX ORDER — SODIUM CHLORIDE, SODIUM LACTATE, POTASSIUM CHLORIDE, CALCIUM CHLORIDE 600; 310; 30; 20 MG/100ML; MG/100ML; MG/100ML; MG/100ML
200 INJECTION, SOLUTION INTRAVENOUS CONTINUOUS
Status: DISCONTINUED | OUTPATIENT
Start: 2018-01-15 | End: 2018-01-15 | Stop reason: HOSPADM

## 2018-01-15 RX ORDER — ZOLPIDEM TARTRATE 5 MG/1
5 TABLET ORAL
Status: DISCONTINUED | OUTPATIENT
Start: 2018-01-15 | End: 2018-01-15

## 2018-01-15 RX ORDER — METOCLOPRAMIDE HYDROCHLORIDE 5 MG/ML
10 INJECTION INTRAMUSCULAR; INTRAVENOUS ONCE AS NEEDED
Status: DISCONTINUED | OUTPATIENT
Start: 2018-01-15 | End: 2018-01-15 | Stop reason: HOSPADM

## 2018-01-15 RX ORDER — DIPHENHYDRAMINE HYDROCHLORIDE 50 MG/ML
12.5 INJECTION INTRAMUSCULAR; INTRAVENOUS ONCE AS NEEDED
Status: DISCONTINUED | OUTPATIENT
Start: 2018-01-15 | End: 2018-01-15 | Stop reason: HOSPADM

## 2018-01-15 RX ORDER — PROPOFOL 10 MG/ML
INJECTION, EMULSION INTRAVENOUS CONTINUOUS PRN
Status: DISCONTINUED | OUTPATIENT
Start: 2018-01-15 | End: 2018-01-15 | Stop reason: SURG

## 2018-01-15 RX ORDER — MEPERIDINE HYDROCHLORIDE 25 MG/ML
12.5 INJECTION INTRAMUSCULAR; INTRAVENOUS; SUBCUTANEOUS
Status: DISCONTINUED | OUTPATIENT
Start: 2018-01-15 | End: 2018-01-15 | Stop reason: HOSPADM

## 2018-01-15 RX ORDER — MAGNESIUM HYDROXIDE 1200 MG/15ML
LIQUID ORAL AS NEEDED
Status: DISCONTINUED | OUTPATIENT
Start: 2018-01-15 | End: 2018-01-15 | Stop reason: HOSPADM

## 2018-01-15 RX ORDER — ONDANSETRON 2 MG/ML
INJECTION INTRAMUSCULAR; INTRAVENOUS AS NEEDED
Status: DISCONTINUED | OUTPATIENT
Start: 2018-01-15 | End: 2018-01-15 | Stop reason: SURG

## 2018-01-15 RX ORDER — ONDANSETRON 2 MG/ML
4 INJECTION INTRAMUSCULAR; INTRAVENOUS ONCE AS NEEDED
Status: DISCONTINUED | OUTPATIENT
Start: 2018-01-15 | End: 2018-01-15 | Stop reason: HOSPADM

## 2018-01-15 RX ORDER — DOCUSATE SODIUM 100 MG/1
100 CAPSULE, LIQUID FILLED ORAL 2 TIMES DAILY PRN
Status: DISCONTINUED | OUTPATIENT
Start: 2018-01-15 | End: 2018-01-15

## 2018-01-15 RX ORDER — EPHEDRINE SULFATE 50 MG/ML
INJECTION, SOLUTION INTRAVENOUS AS NEEDED
Status: DISCONTINUED | OUTPATIENT
Start: 2018-01-15 | End: 2018-01-15 | Stop reason: SURG

## 2018-01-15 RX ADMIN — PROMETHAZINE HYDROCHLORIDE 12.5 MG: 25 INJECTION INTRAMUSCULAR; INTRAVENOUS at 00:23

## 2018-01-15 RX ADMIN — EPHEDRINE SULFATE 10 MG: 50 INJECTION, SOLUTION INTRAMUSCULAR; INTRAVENOUS; SUBCUTANEOUS at 13:26

## 2018-01-15 RX ADMIN — SODIUM CHLORIDE: 0.9 INJECTION, SOLUTION INTRAVENOUS at 13:27

## 2018-01-15 RX ADMIN — SUCCINYLCHOLINE CHLORIDE 100 MG: 20 INJECTION, SOLUTION INTRAMUSCULAR; INTRAVENOUS at 13:10

## 2018-01-15 RX ADMIN — KETOROLAC TROMETHAMINE 15 MG: 30 INJECTION, SOLUTION INTRAMUSCULAR at 00:04

## 2018-01-15 RX ADMIN — PROPOFOL 200 MG: 10 INJECTION, EMULSION INTRAVENOUS at 13:10

## 2018-01-15 RX ADMIN — SODIUM CHLORIDE 100 ML/HR: 0.9 INJECTION, SOLUTION INTRAVENOUS at 11:01

## 2018-01-15 RX ADMIN — CEFAZOLIN SODIUM 1000 MG: 2 SOLUTION INTRAVENOUS at 13:11

## 2018-01-15 RX ADMIN — PROPOFOL 160 MCG/KG/MIN: 10 INJECTION, EMULSION INTRAVENOUS at 13:11

## 2018-01-15 RX ADMIN — DEXAMETHASONE SODIUM PHOSPHATE 10 MG: 10 INJECTION INTRAMUSCULAR; INTRAVENOUS at 13:18

## 2018-01-15 RX ADMIN — SODIUM CHLORIDE 100 ML/HR: 0.9 INJECTION, SOLUTION INTRAVENOUS at 00:24

## 2018-01-15 RX ADMIN — MIDAZOLAM HYDROCHLORIDE 2 MG: 1 INJECTION, SOLUTION INTRAMUSCULAR; INTRAVENOUS at 13:02

## 2018-01-15 RX ADMIN — ONDANSETRON 4 MG: 2 INJECTION INTRAMUSCULAR; INTRAVENOUS at 13:18

## 2018-01-15 RX ADMIN — OXYCODONE HYDROCHLORIDE AND ACETAMINOPHEN 2 TABLET: 5; 325 TABLET ORAL at 00:15

## 2018-01-15 RX ADMIN — FENTANYL CITRATE 50 MCG: 50 INJECTION, SOLUTION INTRAMUSCULAR; INTRAVENOUS at 13:10

## 2018-01-15 RX ADMIN — LIDOCAINE HYDROCHLORIDE 50 MG: 10 INJECTION, SOLUTION INFILTRATION; PERINEURAL at 13:10

## 2018-01-15 RX ADMIN — SODIUM CHLORIDE: 0.9 INJECTION, SOLUTION INTRAVENOUS at 12:52

## 2018-01-15 RX ADMIN — FENTANYL CITRATE 50 MCG: 50 INJECTION, SOLUTION INTRAMUSCULAR; INTRAVENOUS at 13:50

## 2018-01-15 RX ADMIN — SODIUM CHLORIDE, SODIUM LACTATE, POTASSIUM CHLORIDE, AND CALCIUM CHLORIDE 200 ML/HR: .6; .31; .03; .02 INJECTION, SOLUTION INTRAVENOUS at 14:08

## 2018-01-15 NOTE — PROGRESS NOTES
Progress Note - Bharati Heath 1980, 42 y o  female MRN: [id number]    Unit/Bed#: OR POOL Encounter: [id number]    Primary Care Provider: Leila Guadalupe DO   Date and time admitted to hospital: 2018  9:02 PM        CVA tenderness   Assessment & Plan    · Secondary to right ureteral stricture  · Supportive care        Ureteral stricture, right   Assessment & Plan    · For OR per urology        * Hydronephrosis   Assessment & Plan    · Secondary to right ureteral stricture  · For intervention per urology  · Pain management          VTE Pharmacologic Prophylaxis:   Pharmacologic: low risk  Mechanical VTE Prophylaxis in Place: No    Patient Centered Rounds: I have performed bedside rounds with nursing staff today  Discussions with Specialists or Other Care Team Provider:     Education and Discussions with Family / Patient: patient,  at bedside    Time Spent for Care: 30 minutes  More than 50% of total time spent on counseling and coordination of care as described above  Current Length of Stay: 1 day(s)    Current Patient Status: Inpatient   Certification Statement: The patient will continue to require additional inpatient hospital stay due to urologic intervention    Discharge Plan: home when cleared by urology    Code Status: Level 1 - Full Code      Subjective:   Flank pain under better control with pain medicine    Objective:     Vitals:   Temp (24hrs), Av 6 °F (36 4 °C), Min:97 5 °F (36 4 °C), Max:97 7 °F (36 5 °C)    HR:  [54-75] 62  Resp:  [18-20] 20  BP: ()/(55-86) 89/62  SpO2:  [94 %-100 %] 100 %  Body mass index is 22 05 kg/m²  Input and Output Summary (last 24 hours): Intake/Output Summary (Last 24 hours) at 01/15/18 1258  Last data filed at 18 3026   Gross per 24 hour   Intake             2000 ml   Output                0 ml   Net             2000 ml       Physical Exam:     Physical Exam   Constitutional: She is oriented to person, place, and time   She appears well-developed and well-nourished  No distress  HENT:   Head: Normocephalic and atraumatic  Cardiovascular: Normal rate and regular rhythm  Exam reveals no friction rub  No murmur heard  Pulmonary/Chest: Effort normal and breath sounds normal  No respiratory distress  She has no wheezes  Abdominal: Soft  Bowel sounds are normal  She exhibits no distension  There is tenderness  There is no rebound and no guarding  Musculoskeletal: She exhibits no edema  Neurological: She is alert and oriented to person, place, and time  No cranial nerve deficit  Skin: Skin is warm and dry  No rash noted  Psychiatric: She has a normal mood and affect  Nursing note and vitals reviewed  Additional Data:     Labs:      Results from last 7 days  Lab Units 01/15/18  0428 01/14/18  2122   WBC Thousand/uL 9 57 10 46*   HEMOGLOBIN g/dL 12 2 13 2   HEMATOCRIT % 37 1 38 7   PLATELETS Thousands/uL 182 193   NEUTROS PCT %  --  81*   LYMPHS PCT %  --  12*   MONOS PCT %  --  6   EOS PCT %  --  1       Results from last 7 days  Lab Units 01/15/18  0428   SODIUM mmol/L 139   POTASSIUM mmol/L 4 2   CHLORIDE mmol/L 106   CO2 mmol/L 28   BUN mg/dL 13   CREATININE mg/dL 0 82   CALCIUM mg/dL 8 3   GLUCOSE RANDOM mg/dL 103           * I Have Reviewed All Lab Data Listed Above  * Additional Pertinent Lab Tests Reviewed: Paul 66 Admission Reviewed    Imaging:    Imaging Reports Reviewed Today Include: CT abd/pelvis  Imaging Personally Reviewed by Myself Includes:  none    Recent Cultures (last 7 days):       Results from last 7 days  Lab Units 01/11/18  1211   URINE CULTURE  No Growth <100 cfu/mL       Last 24 Hours Medication List:     cefazolin 2,000 mg Intravenous Once   multivitamin-minerals 1 tablet Oral Daily        Today, Patient Was Seen By: Deepali David PA-C    ** Please Note: Dictation voice to text software may have been used in the creation of this document   **

## 2018-01-15 NOTE — OP NOTE
OPERATIVE REPORT  PATIENT NAME: Maryellen Bateman    :  1980  MRN: [id number]  Pt Location: [facility] 01    SURGERY DATE: 1/15/2018    Surgeon(s) and Role:     * Naima Garcia MD - Primary    Preop Diagnosis:  Hydronephrosis, unspecified hydronephrosis type [N13 30]  RIGHT URETERAL STRICTURE    Post-Op Diagnosis Codes: * Hydronephrosis, unspecified hydronephrosis type [N13 30]    Procedure(s) (LRB):  CYSTOSCOPY, RIGHT RETROGRADE PYELOGRAM WITH INSERTION OF RIGHT STENT URETERAL (Right)    Specimen(s):  ID Type Source Tests Collected by Time Destination   A : urine culture, from cysto Urine Urine, Cystoscopic URINE CULTURE Naima Garcia MD 1/15/2018  1:31 PM        Estimated Blood Loss:   0 mL    Drains:       Anesthesia Type:   General    Operative Indications:  Hydronephrosis, unspecified hydronephrosis type [N13 30]  RIGHT URETERAL STRICTURE    Operative Findings:  DISTAL RIGHT URETERAL STRICTURE    Complications:   None    Procedure and Technique:  THIS PATIENT HAD PREVIOUSLY UNDERGONE A RIGHT URETEROSCOPY SECONDARY TO URETERAL INJURY  SHE WAS TREATED WITH HYDROSTATIC BALLOON DILATION AND LASER URETEROTOMY, SHE WAS THEN MAINTAINED ON DOUBLE URETERAL STENTS FOR 8 WEEKS  Both stents were then removed 3 days prior to the emergency room with the event yesterday   She returned to the emergency room with severe flank pain and scans showed significant hydronephrosis on the right side  She is brought to the operating room, identified, and transferred to the operating room table  General anesthesia was then administered  After adequate general anesthesia, the patient was placed in lithotomy position and the genitalia are prepped with Betadine and she was draped  A confirmatory time-out was performed  Cystoscopy follows  Urine culture was obtained  The bladder was hyperemic no other pathology was noted  The left and right ureteral orifice were both unremarkable  No eflux seen on the right side  A Retrograde study was obtained and this showed a distal stricture with significant hydronephrosis above the stricture  A safety wire was then able to pass up into the kidney  A ureteral catheter was passed into the kidney and a retrograde study again confirming chronic dilatation with tortuosity of the ureter  A 7 [place] multi length stent was then placed into the ureter and confirmed position in the renal pelvis and bladder  This allowed decompression of the kidney  No further intervention required at this time  The patient was awakened  Plan will be to maintain the stent, consideration for an operative repair and ureteral reimplantation     I was present for the entire procedure    Patient Disposition:  PACU     SIGNATURE: Romie Durán MD  DATE: January 15, 2018  TIME: 1:37 PM

## 2018-01-15 NOTE — ANESTHESIA POSTPROCEDURE EVALUATION
Post-Op Assessment Note      CV Status:  Stable    Mental Status:  Alert and awake    Hydration Status:  Euvolemic and stable    PONV Controlled:  None    Airway Patency:  Patent    Post Op Vitals Reviewed: Yes          Staff: CRNA           /73 (01/15/18 1347)    Temp (!) 97 °F (36 1 °C) (01/15/18 1347)    Pulse 84 (01/15/18 1347)   Resp 16 (01/15/18 1347)    SpO2 100 % (01/15/18 1347)

## 2018-01-15 NOTE — H&P
- Renetta Guaman 1980, 42 y o  female MRN: [id number]    Unit/Bed#: ED 02 Encounter: [id number]    Primary Care Provider: Nichole Manley DO   Date and time admitted to hospital: 1/14/2018  9:02 PM        Ureteral stricture, right   Assessment & Plan    Patient has recurrent ureteral stricture and status post removal of stent 3 days ago, currently has recurrence  Place patient NPO  Consult Urology (Dr Checo Ivy)  Continue fluids  In preparation for operative procedure, would need repeat CBC, metabolic profile, and in addition, coagulation profile  CVA tenderness   Assessment & Plan    Patient would continue Percocet which she is taking at home  In addition, would give very minute doses of Dilaudid 0 5 milligrams every 3 hours as needed for severe pain  Patient is also given Phenergan for nausea which would help dealing with the pain as well  Ambien for sleep  VTE Prophylaxis: Pharmacologic VTE Prophylaxis contraindicated due to Low risk and patient would need operative procedure   / sequential compression device   Code Status: Prior full code  POLST: There is no POLST form on file for this patient (pre-hospital)    Anticipated Length of Stay:  Patient will be admitted on an Inpatient basis with an anticipated length of stay of  Greater than 2 midnights  Justification for Hospital Stay: Please see detailed plans noted above  Chief Complaint:     Right sided cva tenderness  History of Present Illness:  Renetta Guaman is a 42 y o  female who has a past medical history significant to right ureteral stricture and sees Urology  Approximately 3 days ago urology has removed the stent and she said that she was feeling better  During the time that she had a stent she still has a little bit of discomfort which she was expecting but this was not bad  Urine color was not changed  No note of any fever    Patient was doing quite well until approximately morning of admission when she woke up with a feeling of right-sided costovertebral tingling and somewhat muscular pain  She thought that she slept wrong however the pain became worse as the day went by  She had some nausea this evening so she went to the emergency room to be evaluated  CT scan of the abdomen was read by [facility] and seems to have recurrence of right-sided hydronephrosis with a possible stone in the ureter  Currently, patient is feeling much better although she still has the right-sided costovertebral angle discomfort  Patient's all bit of nausea  No fever  No hypogastric pain  No left-sided tenderness or pain  Review of Systems:    Constitutional:  Denies fever or chills   Eyes:  Denies change in visual acuity   HENT:  Denies nasal congestion or sore throat   Respiratory:  Denies cough or shortness of breath   Cardiovascular:  Denies chest pain or edema   GI:  Denies abdominal pain, nausea, vomiting, bloody stools or diarrhea   :  Denies dysuria but with note of right-sided costovertebral angle discomfort  Musculoskeletal:  Denies back pain or joint pain   Integument:  Denies rash   Neurologic:  Denies headache, focal weakness or sensory changes   Endocrine:  Denies polyuria or polydipsia   Lymphatic:  Denies swollen glands   Psychiatric:  Denies depression or anxiety     Past Medical and Surgical History:   Past Medical History:   Diagnosis Date    Migraine     PONV (postoperative nausea and vomiting)      Past Surgical History:   Procedure Laterality Date    APPENDECTOMY      CYSTOSCOPY W/ LASER LITHOTRIPSY Right 11/2/2017    Procedure: CYSTOSCOPY;  RIGHT URETEROSCOPY WITH  HOLMIUM LASER INCISION OF URETERAL STRICTURE; (WITH HYDROSTATIC RIGHT URETERAL DILATION), BILATERAL RETROGRADE PYELOGRAM AND INSERTION OF RIGHT URETERAL STENT X 2 (4 7 X 26);   Surgeon: Boby Toussaint MD;  [facility];  Service: Urology    DILATION AND CURETTAGE OF UTERUS N/A 12/30/2016    Procedure: DILATATION AND CURETTAGE;  Surgeon: Nayan Maravilla MD;  Location[facility];  Service:    Mavis Cousin DILATION AND EVACUATION      HYSTERECTOMY N/A 5/22/2017    Procedure: EXAM UNDER ANESTHESIA;  Surgeon: Nayan Maravilla MD;  [facility];  Service:     HYSTEROSCOPY N/A 12/30/2016    Procedure: HYSTEROSCOPY;  Surgeon: Nayan Maravilla MD;  [facility];  Service:    Mavis Cousin [place] CYSTOURETHROSCOPY,URETER CATHETER Bilateral 3/27/2017    Procedure: CYSTOSCOPY WITH RETROGRADE PYELOGRAM; RIGHT STENT INSERTION;  Surgeon: Bernadine Franks MD;  [facility];  Service: Urology    [place] CYSTOURETHROSCOPY,URETER CATHETER N/A 1/11/2018    Procedure: CYSTOSCOPY , BILATERAL RETROGRADE PYELOGRAM WITH RIGHT STENT EXTRACTION;  Surgeon: Bernadine Franks MD;  [facility];  Service: Urology    [place] LAPAROSCOPY W TOT HYSTERECT UTERUS 250 GRAM OR LESS N/A 3/24/2017    Procedure: TOTAL LAPAROSCOPIC HYSTERECTOMY   bilateral salpingectomy, cysto; Surgeon: Nayan Maravilla MD;  Location[facility];  Service: Gynecology       Meds/Allergies:  multivitamin [facility]) TABS Take 1 tablet by mouth daily Rashmi Hassan MD Replaced   Ordered as: multivitamin-minerals (CENTRUM) tablet 1 tablet - 1 tablet, Oral, Daily, First dose on Mon 1/15/18 at 0900 **DISPOSE IN 8 GALLON BLACK CONTAINER**    oxyCODONE-acetaminophen (PERCOCET) 5-325 mg per tablet Take 1 tablet by mouth every 4 (four) hours as needed for moderate pain for up to 5 days Max Daily Amount: 6 tablets Rashmi Hassan MD Reordered   Ordered as: oxyCODONE-acetaminophen (PERCOCET) 5-325 mg per tablet 1 tablet - 1 tablet, Oral, Every 4 hours PRN, moderate pain, Starting Sun 1/14/18 at [phone], For 2 days High alert medication  LOOK ALIKE SOUND ALIKE MED          Allergies:    Allergies   Allergen Reactions    Shellfish-Derived Products Anaphylaxis     THROAT ITCHY MIGRAINES     History:  Marital Status: /Civil Union   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  Patient Pre-hospital Living Situation:  Lives at home  Patient Pre-hospital Level of Mobility:  Mobile  Patient Pre-hospital Diet Restrictions:  Regular diet  Substance Use History:   History   Alcohol Use    Yes     Comment: occasional     History   Smoking Status    Former Smoker    Packs/day: 0 20    Types: Cigarettes    Quit date: 2007   Smokeless Tobacco    Never Used     History   Drug Use No       Family History:  History reviewed  No pertinent family history  Physical Exam:     Vitals:   Blood Pressure: 137/86 (01/15/18 0000)  Pulse: 63 (01/15/18 0000)  Temperature: 97 5 °F (36 4 °C) (01/14/18 2028)  Temp Source: Tympanic (01/14/18 2028)  Respirations: 18 (01/15/18 0000)  Weight - Scale: 59 kg (130 lb) (01/14/18 2026)  SpO2: 99 % (01/15/18 0000)    Constitutional:  Well developed, well nourished, no acute distress, non-toxic appearance   Eyes:  PERRL, conjunctiva normal   HENT:  Atraumatic, external ears normal, nose normal, oropharynx moist, no pharyngeal exudates  Neck- normal range of motion, no tenderness, supple   Respiratory:  No respiratory distress, normal breath sounds, no rales, no wheezing   Cardiovascular:  Normal rate, normal rhythm, no murmurs, no gallops, no rubs   GI:  Soft, nondistended, normal bowel sounds, nontender, no organomegaly, no mass, no rebound, no guarding   :  Costovertebral angle tenderness right with radiation towards the right side abdomen  Musculoskeletal:  No edema, no tenderness, no deformities  Back- no tenderness  Integument:  Well hydrated, no rash   Lymphatic:  No lymphadenopathy noted   Neurologic:  Alert &awake, communicative, CN 2-12 normal, normal motor function, normal sensory function, no focal deficits noted   Psychiatric:  Speech and behavior appropriate       Lab Results: I have personally reviewed pertinent reports          Results from last 7 days  Lab Units 01/14/18 2122   WBC Thousand/uL 10 46*   HEMOGLOBIN g/dL 13 2   HEMATOCRIT % 38 7   PLATELETS Thousands/uL 193 NEUTROS PCT % 81*   LYMPHS PCT % 12*   MONOS PCT % 6   EOS PCT % 1       Results from last 7 days  Lab Units 01/14/18  2122   SODIUM mmol/L 138   POTASSIUM mmol/L 4 0   CHLORIDE mmol/L 105   CO2 mmol/L 27   BUN mg/dL 15   CREATININE mg/dL 0 86   CALCIUM mg/dL 8 9   GLUCOSE RANDOM mg/dL 110     No EKG's    Imaging: I have personally reviewed pertinent reports  Fl Retrograde Pyelogram    Result Date: 1/11/2018  Narrative: BILATERAL RETROGRADE PYELOGRAM INDICATION: Hydronephrosis with renal and ureteral calculus obstruction  COMPARISON: Retrograde study 11/2/2017 IMAGES:  7 FLUOROSCOPY TIME:  18 seconds CONTRAST:  26 cc Omnipaque-240 PHYSICIAN: Aureliano FINDINGS: Retrograde opacification of the left ureter demonstrates normal course and caliber without evidence of filling defects within the opacified portions  The left mid ureter is suboptimally opacified    The upper tract is unremarkable  Retrograde opacification of the right ureter demonstrates apparent narrowing at the site of previous stricturing in the proximal ureter  The ureter and upper tract are dilated  Osseous and soft tissue detail limited by technique  Impression: Fluoroscopic guidance provided for retrograde pyelogram as above  Please see procedure report for further details  Workstation performed: [id number]         ** Please Note: Dragon 360 Dictation voice to text software was used in the creation of this document   **

## 2018-01-15 NOTE — CONSULTS
[address] [place] [address] NOTE   Admission Date: 1/14/2018    Patient Identifiers: Joyce Mercado (MRN: [id number], 42 y o , 1980   [facility] Consultation[facility]  Service Providing Consultation:  Urology, Leonard Patterson PA-C  Consults  Date of Service: 1/15/2018    Reason for Consultation: urology evaluation    History of Present Illness:     Joyce Mercado is a 42 y o  old with a history of right ureteral stricture and prior right ureteral stent  Her stent was recently removed 3 days ago  She felt discomfort which was expected She woke up yesterday with worse right sided pain and nausea and went to the ER for evaluation  The CT shows moderate to severe hydronephrosis with possible clot 1 cm above the right UVJ  No fever or chills, no vomiting  Past Medical, Past Surgical History:     Past Medical History:   Diagnosis Date    Migraine     PONV (postoperative nausea and vomiting)    :    Past Surgical History:   Procedure Laterality Date    APPENDECTOMY      CYSTOSCOPY W/ LASER LITHOTRIPSY Right 11/2/2017    Procedure: CYSTOSCOPY;  RIGHT URETEROSCOPY WITH  HOLMIUM LASER INCISION OF URETERAL STRICTURE; (WITH HYDROSTATIC RIGHT URETERAL DILATION), BILATERAL RETROGRADE PYELOGRAM AND INSERTION OF RIGHT URETERAL STENT X 2 (4 7 X 26);   Surgeon: Elsie Wang MD;  [facility];  Service: Urology    DILATION AND CURETTAGE OF UTERUS N/A 12/30/2016    Procedure: DILATATION AND CURETTAGE;  Surgeon: Karina Lovett MD;  [facility];  Service:    [address] [place] [address] N/A 5/22/2017    Procedure: EXAM UNDER ANESTHESIA;  Surgeon: Karina Lovett MD;  [facility];  Service:     HYSTEROSCOPY N/A 12/30/2016    Procedure: HYSTEROSCOPY;  Surgeon: Karina Lovett MD;  [facility];  Service:     [place] CYSTOURETHROSCOPY,URETER CATHETER Bilateral 3/27/2017    Procedure: Will Sheth WITH RETROGRADE PYELOGRAM; RIGHT STENT INSERTION;  Surgeon: Analy Powers MD;  [facility];  Service: Urology    [place] CYSTOURETHROSCOPY,URETER CATHETER N/A 1/11/2018    Procedure: CYSTOSCOPY , BILATERAL RETROGRADE PYELOGRAM WITH RIGHT STENT EXTRACTION;  Surgeon: Analy Powers MD;  [facility];  Service: Urology    [place] LAPAROSCOPY W TOT HYSTERECT UTERUS 250 GRAM OR LESS N/A 3/24/2017    Procedure: TOTAL LAPAROSCOPIC HYSTERECTOMY   bilateral salpingectomy, cysto;   Surgeon: Jessika Boggs MD;  [facility];  Service: Gynecology   :    Medications, Allergies:     Current Facility-Administered Medications:     acetaminophen (TYLENOL) tablet 650 mg, 650 mg, Oral, Q6H PRN, Benito Mccartney MD    aluminum-magnesium hydroxide-simethicone (MYLANTA) 200-200-20 mg/5 mL oral suspension 30 mL, 30 mL, Oral, Q6H PRN, Benito Mccartney MD    docusate sodium (COLACE) capsule 100 mg, 100 mg, Oral, BID PRN, Benito Mccartney MD    HYDROmorphone (DILAUDID) injection 0 5 mg, 0 5 mg, Intravenous, Q3H PRN, Benito Mccartney MD    multivitamin-minerals (CENTRUM) tablet 1 tablet, 1 tablet, Oral, Daily, Benito Mccartney MD    oxyCODONE-acetaminophen (PERCOCET) 5-325 mg per tablet 1 tablet, 1 tablet, Oral, Q4H PRN, Benito Mccartney MD    promethazine (PHENERGAN) injection 12 5 mg, 12 5 mg, Intravenous, Q6H PRN, Benito Mccartney MD, 12 5 mg at 01/15/18 0023    sodium chloride 0 9 % infusion, 100 mL/hr, Intravenous, Continuous, Benito Mccartney MD, Last Rate: 100 mL/hr at 01/15/18 0024, 100 mL/hr at 01/15/18 0024    zolpidem (AMBIEN) tablet 5 mg, 5 mg, Oral, HS PRN, Benito Mccartney MD    Current Outpatient Prescriptions:     multivitamin (THERAGRAN) TABS, Take 1 tablet by mouth daily, Disp: , Rfl:     oxyCODONE-acetaminophen (PERCOCET) 5-325 mg per tablet, Take 1 tablet by mouth every 4 (four) hours as needed for moderate pain for up to 5 days Max Daily Amount: 6 tablets, Disp: 18 tablet, Rfl: 0    Allergies: Allergies   Allergen Reactions    Shellfish-Derived Products Anaphylaxis     THROAT ITCHY MIGRAINES   :    Social and Family History:   Social History:   Social History   Substance Use Topics    Smoking status: Former Smoker     Packs/day: 0 20     Types: Cigarettes     Quit date: 2007    Smokeless tobacco: Never Used    Alcohol use Yes      Comment: occasional        History   Smoking Status    Former Smoker    Packs/day: 0 20    Types: Cigarettes    Quit date: 2007   Smokeless Tobacco    Never Used       Family History:  History reviewed  No pertinent family history :     Review of Systems:     General: Fever, chills, or night sweats: negative  Cardiac: Negative for chest pain  Pulmonary: Negative for shortness of breath  Gastrointestinal: Abdominal pain positive  Nausea, vomiting, or diarrhea negative,  Genitourinary: See HPI above  Patient does not have hematuria  All other systems queried were negative  Physical Exam:   General: Patient is pleasant and in NAD  Awake and alert  BP 90/58   Pulse 58   Temp 97 5 °F (36 4 °C) (Tympanic)   Resp 18   Wt 59 kg (130 lb)   SpO2 97%   BMI 21 63 kg/m²   Cardiac: Peripheral edema: negative  Pulmonary: Non-labored breathing  Abdomen: Soft, non-tender, non-distended  No surgical scars  No masses, tenderness, hernias noted  Genitourinary: Positive CVA tenderness, negative suprapubic tenderness        Labs:     Lab Results   Component Value Date    HGB 12 2 01/15/2018    HCT 37 1 01/15/2018    WBC 9 57 01/15/2018     01/15/2018   ]    Lab Results   Component Value Date     01/15/2018    K 4 2 01/15/2018     01/15/2018    CO2 28 01/15/2018    BUN 13 01/15/2018    CREATININE 0 82 01/15/2018    CALCIUM 8 3 01/15/2018    GLUCOSE 103 01/15/2018   ]    Imaging:   I personally reviewed the images and report of the following studies, and reviewed them with the patient:  CT ABDOMEN AND PELVIS WITHOUT IV CONTRAST - LOW DOSE RENAL STONE       IMPRESSION:     0 6 cm hyperdensity in the distal right ureter 1 cm proximal to the UVJ  Hemorrhagic clot favored over calculus  Moderate to severe upstream hydronephroureterosis slightly worse since January 11, 2018 accounting for differences in technique  Chronic right proximal ureteral stricture  Trace gas within the bladder and trace adjacent stranding attributed to recent intervention  Correlate with clinical parameters to exclude nonspecific cystitis  Findings are consistent with the preliminary report from [facility] which was provided shortly after completion of the exam                      ASSESSMENT:     1  Right hydronephrosis  2  Right ureteral stricture      PLAN:     - seen with Dr Pamela Causey  - NPO   - schedule for cysto retrograde  And right NU stent insertion later today      Thank you for allowing me to participate in this patients care  Please do not hesitate to call with any additional questions    Spring Mac PA-C

## 2018-01-15 NOTE — ED PROVIDER NOTES
History  Chief Complaint   Patient presents with    Flank Pain     Pt had stents removed Thursday  Pt c/o pain and vomiting today  42-year-old with history of ureteral strictures and hydronephrosis presents with acute onset of right-sided flank pain  Patient had right-sided ureteral stents removed 3 days ago by her urologist Dr Pamela Causey and reports that this evening she had acute onset of right-sided flank pain similar to previous pain that she has had with the prior obstruction  Reports having nausea with vomiting as well  Denies any fever, chills chest pain or shortness of breath  Prior to Admission Medications   Prescriptions Last Dose Informant Patient Reported? Taking?   multivitamin (THERAGRAN) TABS   Yes No   Sig: Take 1 tablet by mouth daily   oxyCODONE-acetaminophen (PERCOCET) 5-325 mg per tablet   No No   Sig: Take 1 tablet by mouth every 4 (four) hours as needed for moderate pain for up to 5 days Max Daily Amount: 6 tablets      Facility-Administered Medications: None       Past Medical History:   Diagnosis Date    Migraine     PONV (postoperative nausea and vomiting)        Past Surgical History:   Procedure Laterality Date    APPENDECTOMY      CYSTOSCOPY W/ LASER LITHOTRIPSY Right 11/2/2017    Procedure: CYSTOSCOPY;  RIGHT URETEROSCOPY WITH  HOLMIUM LASER INCISION OF URETERAL STRICTURE; (WITH HYDROSTATIC RIGHT URETERAL DILATION), BILATERAL RETROGRADE PYELOGRAM AND INSERTION OF RIGHT URETERAL STENT X 2 (4 7 X 26);   Surgeon: Ranulfo Wells MD;  [facility];  Service: Urology    DILATION AND CURETTAGE OF UTERUS N/A 12/30/2016    Procedure: DILATATION AND CURETTAGE;  Surgeon: Gisel Nichole MD;  [facility];  Service:    [facility] N/A 5/22/2017    Procedure: EXAM UNDER ANESTHESIA;  Surgeon: Gisel Nichole MD;  [facility];  Service:     HYSTEROSCOPY N/A 12/30/2016    Procedure: HYSTEROSCOPY;  Surgeon: Gisel Nichole MD; [facility];  Service:     WI CYSTOURETHROSCOPY,URETER CATHETER Bilateral 3/27/2017    Procedure: CYSTOSCOPY WITH RETROGRADE PYELOGRAM; RIGHT STENT INSERTION;  Surgeon: Karo Martínez MD;  [facility];  Service: Urology    WI CYSTOURETHROSCOPY,URETER CATHETER N/A 1/11/2018    Procedure: CYSTOSCOPY , BILATERAL RETROGRADE PYELOGRAM WITH RIGHT STENT EXTRACTION;  Surgeon: Karo Martínez MD;  [facility];  Service: Urology    WI LAPAROSCOPY W TOT HYSTERECT UTERUS 250 GRAM OR LESS N/A 3/24/2017    Procedure: TOTAL LAPAROSCOPIC HYSTERECTOMY   bilateral salpingectomy, cysto; Surgeon: Shannan Duke MD;  [facility];  Service: Gynecology       History reviewed  No pertinent family history  I have reviewed and agree with the history as documented  Social History   Substance Use Topics    Smoking status: Former Smoker     Packs/day: 0 20     Types: Cigarettes     Quit date: 2007    Smokeless tobacco: Never Used    Alcohol use Yes      Comment: occasional        Review of Systems   Constitutional: Negative for chills and fever  HENT: Negative for congestion and sore throat  Eyes: Negative for pain and redness  Respiratory: Negative for shortness of breath and wheezing  Cardiovascular: Negative for chest pain and palpitations  Gastrointestinal: Negative for abdominal pain, diarrhea and vomiting  Endocrine: Negative for polydipsia and polyphagia  Genitourinary: Positive for flank pain  Negative for dysuria  Musculoskeletal: Negative for arthralgias and back pain  Skin: Negative for rash and wound  Neurological: Negative for seizures and headaches  Psychiatric/Behavioral: Negative for agitation and behavioral problems  All other systems reviewed and are negative        Physical Exam  ED Triage Vitals   Temperature Pulse Respirations Blood Pressure SpO2   01/14/18 2028 01/14/18 2026 01/14/18 2026 01/14/18 2026 01/14/18 2026   97 5 °F (36 4 °C) 75 18 121/83 97 %      Temp Source Heart Rate Source Patient Position - Orthostatic VS BP Location FiO2 (%)   01/14/18 2028 01/14/18 2122 01/14/18 2122 01/14/18 2122 --   Tympanic Monitor Lying Right arm       Pain Score       01/14/18 2026       Worst Possible Pain           Orthostatic Vital Signs  Vitals:    01/14/18 2258 01/15/18 0000 01/15/18 0030 01/15/18 0100   BP: 118/77 137/86 132/75 103/62   Pulse: 70 63 74 72   Patient Position - Orthostatic VS: Lying Lying Lying Lying       Physical Exam   Constitutional: She is oriented to person, place, and time  She appears distressed  HENT:   Head: Normocephalic and atraumatic  Right Ear: External ear normal    Left Ear: External ear normal    Mouth/Throat: Oropharynx is clear and moist    Eyes: EOM are normal  Pupils are equal, round, and reactive to light  Neck: Normal range of motion  Cardiovascular: Normal rate, regular rhythm and normal heart sounds  Exam reveals no friction rub  No murmur heard  Pulmonary/Chest: Effort normal  No respiratory distress  She has no wheezes  Abdominal: Soft  Bowel sounds are normal  She exhibits no distension  There is no tenderness  There is no rebound and no guarding  Musculoskeletal: Normal range of motion  She exhibits no edema  R sided CVA tenderness    Neurological: She is alert and oriented to person, place, and time  No cranial nerve deficit  Coordination normal    Skin: Skin is warm  She is diaphoretic  No erythema  Psychiatric: She has a normal mood and affect  Her behavior is normal    Nursing note and vitals reviewed        ED Medications  Medications   multivitamin-minerals (CENTRUM) tablet 1 tablet (not administered)   oxyCODONE-acetaminophen (PERCOCET) 5-325 mg per tablet 1 tablet (not administered)   sodium chloride 0 9 % infusion (100 mL/hr Intravenous New Bag 1/15/18 0024)   docusate sodium (COLACE) capsule 100 mg (not administered)   aluminum-magnesium hydroxide-simethicone (MYLANTA) 200-200-20 mg/5 mL oral suspension 30 mL (not administered)   acetaminophen (TYLENOL) tablet 650 mg (not administered)   HYDROmorphone (DILAUDID) injection 0 5 mg (not administered)   promethazine (PHENERGAN) injection 12 5 mg (12 5 mg Intravenous Given 1/15/18 0023)   zolpidem (AMBIEN) tablet 5 mg (not administered)   sodium chloride 0 9 % bolus 1,000 mL (0 mL Intravenous Stopped 1/14/18 2258)   HYDROmorphone (DILAUDID) injection 1 mg (1 mg Intravenous Given 1/14/18 2120)   ondansetron (ZOFRAN) injection 4 mg (4 mg Intravenous Given 1/14/18 2121)   HYDROmorphone (DILAUDID) injection 1 mg (1 mg Intravenous Given 1/14/18 2206)   oxyCODONE-acetaminophen (PERCOCET) 5-325 mg per tablet 2 tablet (2 tablets Oral Given 1/15/18 0015)   ketorolac (TORADOL) injection 15 mg (15 mg Intravenous Given 1/15/18 0004)       Diagnostic Studies  Results Reviewed     Procedure Component Value Units Date/Time    Urine Microscopic [[id number]]  (Abnormal) Collected:  01/14/18 2213    Lab Status:  Final result Specimen:  Urine from Urine, Clean Catch Updated:  01/14/18 2227     RBC, UA 2-4 (A) /hpf      WBC, UA None Seen /hpf      Epithelial Cells Occasional /hpf      Bacteria, UA Occasional /hpf      Hyaline Casts, UA None Seen /lpf     POCT pregnancy, urine [[id number]]  (Normal) Resulted:  01/14/18 2213    Lab Status:  Final result Updated:  01/14/18 2214     EXT PREG TEST UR (Ref: Negative) neg    POCT urinalysis dipstick [[id number]]  (Normal) Resulted:  01/14/18 2213    Lab Status:  Final result Specimen:  Urine Updated:  01/14/18 2213     Color, UA see chart    ED Urine Macroscopic [[id number]]  (Abnormal) Collected:  01/14/18 2213    Lab Status:  Final result Specimen:  Urine Updated:  01/14/18 2211     Color, UA Yellow     Clarity, UA Clear     pH, UA 8 5 (H)     Leukocytes, UA Negative     Nitrite, UA Negative     Protein, UA Trace (A) mg/dl      Glucose, UA Negative mg/dl      Ketones, UA Trace (A) mg/dl      Urobilinogen, UA 0 2 E U /dl      Bilirubin, UA Negative     Blood, UA Trace (A)     Specific [place], UA 1 015    Narrative:       CLINITEK RESULT    Basic metabolic panel [[id number]] Collected:  01/14/18 2122    Lab Status:  Final result Specimen:  Blood from Arm, Right Updated:  01/14/18 2151     Sodium 138 mmol/L      Potassium 4 0 mmol/L      Chloride 105 mmol/L      CO2 27 mmol/L      Anion Gap 6 mmol/L      BUN 15 mg/dL      Creatinine 0 86 mg/dL      Glucose 110 mg/dL      Calcium 8 9 mg/dL      eGFR 85 ml/min/1 73sq m     Narrative:         National Kidney Disease Education Program recommendations are as follows:  GFR calculation is accurate only with a steady state creatinine  Chronic Kidney disease less than 60 ml/min/1 73 sq  meters  Kidney failure less than 15 ml/min/1 73 sq  meters  CBC and differential [[id number]]  (Abnormal) Collected:  01/14/18 2122    Lab Status:  Final result Specimen:  Blood from Arm, Right Updated:  01/14/18 2131     WBC 10 46 (H) Thousand/uL      RBC 4 47 Million/uL      Hemoglobin 13 2 g/dL      Hematocrit 38 7 %      MCV 87 fL      MCH 29 5 pg      MCHC 34 1 g/dL      RDW 12 5 %      MPV 10 9 fL      Platelets 916 Thousands/uL      nRBC 0 /100 WBCs      Neutrophils Relative 81 (H) %      Lymphocytes Relative 12 (L) %      Monocytes Relative 6 %      Eosinophils Relative 1 %      Basophils Relative 0 %      Neutrophils Absolute 8 39 (H) Thousands/µL      Lymphocytes Absolute 1 27 Thousands/µL      Monocytes Absolute 0 65 Thousand/µL      Eosinophils Absolute 0 09 Thousand/µL      Basophils Absolute 0 03 Thousands/µL                  CT renal stone study abdomen pelvis without contrast    (Results Pending)         Procedures  Procedures      Phone Consults  ED Phone Contact    ED Course  ED Course as of Rich 15 0145   Mookie Dumont Jan 14, 2018   2321 [facility] CT a/p: Severe right-sided hydroureteronephrosis   7 mm density in the distal right ureter suggests a calculus    2323 Spoke to urology, will admit and keep patient NPO MDM  Number of Diagnoses or Management Options  Diagnosis management comments:  Impression: right-sided flank pain with history of ureteral stenosis, the symptoms similar to previous  Plan:  CT of abdomen/pelvis, and labs including renal function, urinalysis, treat pain    CritCare Time    Disposition  Final diagnoses:   Intractable pain   Hydronephrosis, unspecified hydronephrosis type     Time reflects when diagnosis was documented in both MDM as applicable and the Disposition within this note     Time User Action Codes Description Comment    1/14/2018 11:55 PM Dhaval Spencer S Add [N13 5] Ureteral stricture, right     1/14/2018 11:55 PM Sherrin [place] Modify [N13 5] Ureteral stricture, right     1/14/2018 11:55 PM Sherrin [place] Modify [N13 5] Ureteral stricture, right     1/15/2018  1:44 AM Henrietta Castro R Add [R52] Intractable pain     1/15/2018  1:45 AM Henrietta Castro R Add [N13 30] Hydronephrosis, unspecified hydronephrosis type       ED Disposition     ED Disposition Condition Comment    Admit  Case was discussed with VINICIO and the patient's admission status was agreed to be Admission Status: observation status to the service of Dr Dipti Bullock   Follow-up Information    None       Patient's Medications   Discharge Prescriptions    No medications on file     No discharge procedures on file  ED Provider  Attending physically available and evaluated Meena Perez I managed the patient along with the ED Attending      Electronically Signed by         Hamlet Marie MD  01/15/18 0158

## 2018-01-15 NOTE — CASE MANAGEMENT
Thank you,  [address]  [facility] in the [facility] by Daniel Leonardo for 2017  Network Utilization Review Department  Phone: [phone]; Fax [phone]  ATTENTION: The Network Utilization Review Department is now centralized for our 7 Facilities  Make a note that we have a new phone and fax numbers for our Department  Please call with any questions or concerns to [phone] and carefully follow the prompts so that you are directed to the right person  All voicemails are confidential  Fax any determinations, approvals, denials, and requests for initial or continue stay review clinical to [phone]  Due to HIGH CALL volume, it would be easier if you could please send faxed requests to expedite your requests and in part, help us provide discharge notifications faster     ======================================================================    Initial Clinical Review    Admission: Date/Time/Statement: 1/14/18 @ 2355  Orders Placed This Encounter   Procedures    Inpatient Admission     Standing Status:   Standing     Number of Occurrences:   1     Order Specific Question:   Admitting Physician     Answer:   Karolina Mercedes [[id number]]     Order Specific Question:   Level of Care     Answer:   Med Surg [[id number]]     Order Specific Question:   Estimated length of stay     Answer:   More than 2 Midnights     Order Specific Question:   Certification     Answer:   I certify that inpatient services are medically necessary for this patient for a duration of greater than two midnights  See H&P and MD Progress Notes for additional information about the patient's course of treatment           ED: Date/Time/Mode of Arrival:   ED Arrival Information     Expected Arrival Acuity Means of Arrival Escorted By Service Admission Type    - 1/14/2018 20:06 Emergent Walk-In Self General Medicine Emergency    Arrival Complaint    medical problem          Chief Complaint:   Chief Complaint   Patient presents with    Flank Pain     Pt had stents removed Thursday  Pt c/o pain and vomiting today  History of Illness:   Adarsh Clement is a 42 y o  female who has a past medical history significant to right ureteral stricture and sees Urology  Approximately 3 days ago urology has removed the stent and she said that she was feeling better  During the time that she had a stent she still has a little bit of discomfort which she was expecting but this was not bad  Urine color was not changed  No note of any fever  Patient was doing quite well until approximately morning of admission when she woke up with a feeling of right-sided costovertebral tingling and somewhat muscular pain  She thought that she slept wrong however the pain became worse as the day went by  She had some nausea this evening so she went to the emergency room to be evaluated    CT scan of the abdomen was read by virtual Radiology and seems to have recurrence of right-sided hydronephrosis with a possible stone in the ureter      ED Vital Signs:   ED Triage Vitals   Temperature Pulse Respirations Blood Pressure SpO2   01/14/18 2028 01/14/18 2026 01/14/18 2026 01/14/18 2026 01/14/18 2026   97 5 °F (36 4 °C) 75 18 121/83 97 %      Temp Source Heart Rate Source Patient Position - Orthostatic VS BP Location FiO2 (%)   01/14/18 2028 01/14/18 2122 01/14/18 2122 01/14/18 2122 --   Tympanic Monitor Lying Right arm       Pain Score       01/14/18 2026       Worst Possible Pain        Wt Readings from Last 1 Encounters:   01/15/18 60 1 kg (132 lb 7 9 oz)     Abnormal Labs/Diagnostic Test Results:    Sodium 138 136 - 145 mmol/L     Potassium 4 0 3 5 - 5 3 mmol/L     Chloride 105 100 - 108 mmol/L     CO2 27 21 - 32 mmol/L     Anion Gap 6 4 - 13 mmol/L     BUN 15 5 - 25 mg/dL     Creatinine 0 86 0 60 - 1 30 mg/dL     Comment: Standardized to IDMS reference method       Glucose 110 65 - 140 mg/dL     Comment:   If the patient is fasting, the ADA then defines impaired fasting glucose as > 100 mg/dL and diabetes as > or equal to 123 mg/dL  Specimen collection should occur prior to Sulfasalazine administration due to the potential for falsely depressed results  Specimen collection should occur prior to Sulfapyridine administration due to the potential for falsely elevated results  Calcium 8 9 8 3 - 10 1 mg/dL     eGFR 85 ml/min/1 73sq m      WBC 10 46 (H) 4 31 - 10 16 Thousand/uL      RBC 4 47 3 81 - 5 12 Million/uL     Hemoglobin 13 2 11 5 - 15 4 g/dL     Hematocrit 38 7 34 8 - 46 1 %       Color, UA Yellow    Clarity, UA Clear    pH, UA 8 5 (H) 4 5 - 8 0     Leukocytes, UA Negative Negative     Nitrite, UA Negative Negative     Protein, UA Trace (A) Negative mg/dl     Glucose, UA Negative Negative mg/dl     Ketones, UA Trace (A) Negative mg/dl     Urobilinogen, UA 0 2 0 2, 1 0 E U /dl E U /dl     Bilirubin, UA Negative Negative     Blood, UA Trace (A) Negative     Specific Gravity, UA 1 015 1 003 - 1 030      CT abd/pel:  renal stone study:  0 6 cm hyperdensity in the distal right ureter 1 cm proximal to the UVJ   Hemorrhagic clot favored over calculus   Moderate to severe upstream hydronephroureterosis slightly worse since January 11, 2018 accounting for differences in technique  Chronic right proximal ureteral stricture  Trace gas within the bladder and trace adjacent stranding attributed to recent intervention   Correlate with clinical parameters to exclude nonspecific cystitis        ED Treatment:   Medication Administration from 01/14/2018 2006 to 01/15/2018 1026    Date/Time Order Dose Route Action Action by Comments   01/14/2018 2121 sodium chloride 0 9 % bolus 1,000 mL 1,000 mL Intravenous Given Bart Botello RN    01/14/2018 2120 HYDROmorphone (DILAUDID) injection 1 mg 1 mg Intravenous Given Bart Botello RN    01/14/2018 2121 ondansetron (ZOFRAN) injection 4 mg 4 mg Intravenous Given Bart Botello RN    01/14/2018 2206 HYDROmorphone (DILAUDID) injection 1 mg 1 mg Intravenous Given Malia Zuluaga RN    01/15/2018 0015 oxyCODONE-acetaminophen (PERCOCET) 5-325 mg per tablet 2 tablet 2 tablet Oral Given Nicole Alexis RN    01/15/2018 0004 ketorolac (TORADOL) injection 15 mg 15 mg Intravenous Given Nicole Alexis RN    01/15/2018 0908 multivitamin-minerals (CENTRUM) tablet 1 tablet 0 tablet Oral Hold Glenroy Cee RN pt genet  fo the OR NPO   01/15/2018 0024 sodium chloride 0 9 % infusion 100 mL/hr Intravenous Given Nicole Alexis RN    01/15/2018 0023 promethazine (PHENERGAN) injection 12 5 mg 12 5 mg Intravenous Given Nicole Alexis RN           Past Medical/Surgical History: Active Ambulatory Problems     Diagnosis Date Noted    Vaginal injury 05/22/2017    Ureteral stricture, right 11/02/2017     Resolved Ambulatory Problems     Diagnosis Date Noted    Menorrhagia with irregular cycle 12/29/2016    Abnormal pelvic ultrasound 12/29/2016    Status post dilation and curettage 12/30/2016    Uterine leiomyoma 03/20/2017    Hydronephrosis of right kidney 03/28/2017     Past Medical History:   Diagnosis Date    Migraine     PONV (postoperative nausea and vomiting)        Admitting Diagnosis: Flank pain [R10 9]  Ureteral stricture, right [N13 5]  Intractable pain [R52]  Hydronephrosis, unspecified hydronephrosis type [N13 30]    Age/Sex: 42 y o  female    Assessment/Plan:   Ureteral stricture, right   Assessment & Plan     Patient has recurrent ureteral stricture and status post removal of stent 3 days ago, currently has recurrence  Place patient NPO  Consult Urology (Dr Claudia Walter)  Continue fluids  In preparation for operative procedure, would need repeat CBC, metabolic profile, and in addition, coagulation profile        CVA tenderness   Assessment & Plan     Patient would continue Percocet which she is taking at home    In addition, would give very minute doses of Dilaudid 0 5 milligrams every 3 hours as needed for severe pain  Patient is also given Phenergan for nausea which would help dealing with the pain as well  Ambien for sleep             VTE Prophylaxis: Pharmacologic VTE Prophylaxis contraindicated due to Low risk and patient would need operative procedure   / sequential compression device   Anticipated Length of Stay:  Patient will be admitted on an Inpatient basis with an anticipated length of stay of  Greater than 2 midnights  Justification for Hospital Stay: Please see detailed plans noted above        Admission Orders:  Scheduled Meds:   multivitamin-minerals 1 tablet Oral Daily     Continuous Infusions:   sodium chloride 100 mL/hr Last Rate: 100 mL/hr (01/15/18 1101)     PRN Meds:   acetaminophen    aluminum-magnesium hydroxide-simethicone    docusate sodium    HYDROmorphone    oxyCODONE-acetaminophen    promethazine    zolpidem    NPO  [facility]      1/15/2018  Consult Uro  1  Right hydronephrosis  2   Right ureteral stricture        PLAN:      - seen with Dr Haylie Laura  - NPO   - schedule for cysto retrograde  And right NU stent insertion later today

## 2018-01-15 NOTE — ASSESSMENT & PLAN NOTE
Patient would continue Percocet which she is taking at home  In addition, would give very minute doses of Dilaudid 0 5 milligrams every 3 hours as needed for severe pain  Patient is also given Phenergan for nausea which would help dealing with the pain as well  Ambien for sleep

## 2018-01-15 NOTE — ANESTHESIA PREPROCEDURE EVALUATION
Review of Systems/Medical History      History of anesthetic complications PONV    Cardiovascular  Negative cardio ROS    Pulmonary  Negative pulmonary ROS Smoker (quit in 2007) ex-smoker , ,        GI/Hepatic  Negative GI/hepatic ROS          Kidney stones,   Comment: Chronic ureteral stricture, s/p stent removal 3 days ago, now with hyrdronephrosis      Endo/Other  Negative endo/other ROS      GYN    Hysterectomy,        Hematology  Negative hematology ROS      Musculoskeletal  Negative musculoskeletal ROS        Neurology    Headaches,    Psychology   Negative psychology ROS            Physical Exam    Airway    Mallampati score: I  TM Distance: >3 FB  Neck ROM: full     Dental   No notable dental hx     Cardiovascular  Comment: Negative ROS,     Pulmonary      Other Findings        Anesthesia Plan  ASA Score- 2 Emergent    Anesthesia Type- general with ASA Monitors  Additional Monitors:   Airway Plan: ETT  Comment: General anesthesia, endotracheal tube; standard ASA monitors  Risks and benefits discussed with patient; patient consented and agrees to proceed  I saw and evaluated the patient  If seen with CRNA, we have discussed the anesthetic plan and I am in agreement that the plan is appropriate for the patient  UPT neg 1/14/2018  PONV - TIVA  Pt has been nauseated, vomited yesterday; will plan for ETT to minimize risk of aspiration  Plan Factors-    Induction- intravenous  Postoperative Plan- Plan for postoperative opioid use  Informed Consent- Anesthetic plan and risks discussed with patient  I personally reviewed this patient with the CRNA  Discussed and agreed on the Anesthesia Plan with the CRNA  Elizabeth Parker

## 2018-01-15 NOTE — ASSESSMENT & PLAN NOTE
Patient has recurrent ureteral stricture and status post removal of stent 3 days ago, currently has recurrence  Place patient NPO  Consult Urology (Dr Aurora Saunders)  Continue fluids  In preparation for operative procedure, would need repeat CBC, metabolic profile, and in addition, coagulation profile

## 2018-01-15 NOTE — DISCHARGE SUMMARY
Discharge- Norris Schumacher 1980, 42 y o  female MRN: [id number]    Unit/Bed#: [facility] 633-01 Encounter: [id number]    Primary Care Provider: Pam Agee,    Date and time admitted to hospital: 1/14/2018  9:02 PM        CVA tenderness   Assessment & Plan    · Secondary to right ureteral stricture  · Supportive care        Ureteral stricture, right   Assessment & Plan    · Status post cystoscopy and stent placement per Urology  · Will need outpatient urology follow-up and further intervention  · No further antibiotics required at this time  · Culture sent per Dr Ariana Pretty        * Hydronephrosis   Assessment & Plan    · Secondary to right ureteral stricture  · For intervention per urology  · Pain management              Consultations During Hospital Stay:  · Dr Ariana Pretty    Procedures Performed:     · CYSTOSCOPY, RIGHT RETROGRADE PYELOGRAM WITH INSERTION OF RIGHT STENT URETERAL     Significant Findings / Test Results:     · Right hydronephrosis    Incidental Findings:   · none     Test Results Pending at Discharge (will require follow up):   · none     Outpatient Tests Requested:  · Outpatient follow-up with Urology    Complications:  None    Reason for Admission:  Right flank pain    Hospital Course:     Norris Schumacher is a 42 y o  female patient who originally presented to the hospital on 1/14/2018 due to right flank pain  Patient had a recent right ureteral stricture and stent removal about 3 days prior  Patient's pain became worse and came to the ED for evaluation  She was found to have recurrence right hydronephrosis  Dr Kelvin Jung placed a right ureteral stent  He will follow up with her as an outpatient for further urologic intervention  Please see above list of diagnoses and related plan for additional information       Condition at Discharge: good     Discharge Day Visit / Exam:     * Please refer to separate progress note for these details *    Discussion with Family: none    Discharge instructions/Information to patient and family:   See after visit summary for information provided to patient and family  Provisions for Follow-Up Care:  See after visit summary for information related to follow-up care and any pertinent home health orders  Disposition:     Home    For Discharges to [facility] SNF:   · Not Applicable to this Patient - Not Applicable to this Patient    Planned Readmission: may need readmission in next 30 days for further urologic intervention     Discharge Statement:  I spent 45 minutes discharging the patient  This time was spent on the day of discharge  I had direct contact with the patient on the day of discharge  Greater than 50% of the total time was spent examining patient, answering all patient questions, arranging and discussing plan of care with patient as well as directly providing post-discharge instructions  Additional time then spent on discharge activities  Discharge Medications:  See after visit summary for reconciled discharge medications provided to patient and family        ** Please Note: This note has been constructed using a voice recognition system **

## 2018-01-15 NOTE — ASSESSMENT & PLAN NOTE
· Status post cystoscopy and stent placement per Urology  · Will need outpatient urology follow-up and further intervention  · No further antibiotics required at this time  · Culture sent per Dr Haylie Laura

## 2018-01-15 NOTE — PROGRESS NOTES
Urology    Patient had stent placement which was uneventful  She may be discharged for home care management with the stent  She will require further urologic intervention in the near future  Culture specimen obtained  She requires no additional antibiotic medication at this time  She may be discharged when tolerating p o  diet and ambulatory  I will make arrangements for follow-up urology care

## 2018-01-16 NOTE — ED ATTENDING ATTESTATION
Jolene Sloan DO, saw and evaluated the patient  I have discussed the patient with the resident/non-physician practitioner and agree with the resident's/non-physician practitioner's findings, Plan of Care, and MDM as documented in the resident's/non-physician practitioner's note, except where noted  All available labs and Radiology studies were reviewed  At this point I agree with the current assessment done in the Emergency Department  I have conducted an independent evaluation of this patient a history and physical is as follows:      Patient is a 70-year-old female with history of a right ureteral stricture and hydronephrosis after accidental trauma to the right ureter during a hysterectomy  Recently had a right-sided ureteral stent removed 3 days ago by her urologist Dr Bryant Martel  She reported earlier today she began with right-sided flank pain similar previous episodes that resulted in right-sided hydronephrosis  Nausea vomiting as well  Denies any fevers or chills, chest pain, shortness of breath  Denies any headaches  Patient  Some moderate discomfort  Patient has right lower quadrant and right flank pain similar to previous events  No zoster rash, no ecchymosis  Plans of check labs including creatinine, CT scan abdomen pelvis, urinalysis, analgesia, antiemetics, discussed with Urology      CT scan reveals severe right-sided hydro nephrosis, discussed with Urology, patient be NPO and admitted to the hospital     [facility] Time    Procedures

## 2018-01-17 LAB — BACTERIA UR CULT: NORMAL

## 2018-02-08 ENCOUNTER — PREP FOR PROCEDURE (OUTPATIENT)
Dept: UROLOGY | Facility: CLINIC | Age: 38
End: 2018-02-08

## 2018-02-08 DIAGNOSIS — Z01.818 PREOP EXAMINATION: Primary | ICD-10-CM

## 2018-02-13 NOTE — PRE-PROCEDURE INSTRUCTIONS
Pre-Surgery Instructions:   Medication Instructions    multivitamin (THERAGRAN) TABS Instructed patient per Anesthesia Guidelines

## 2018-02-14 ENCOUNTER — OFFICE VISIT (OUTPATIENT)
Dept: UROLOGY | Facility: CLINIC | Age: 38
End: 2018-02-14
Payer: COMMERCIAL

## 2018-02-14 VITALS
BODY MASS INDEX: 21.66 KG/M2 | HEIGHT: 65 IN | WEIGHT: 130 LBS | HEART RATE: 82 BPM | SYSTOLIC BLOOD PRESSURE: 96 MMHG | DIASTOLIC BLOOD PRESSURE: 67 MMHG

## 2018-02-14 DIAGNOSIS — N13.30 HYDRONEPHROSIS, UNSPECIFIED HYDRONEPHROSIS TYPE: Primary | ICD-10-CM

## 2018-02-14 DIAGNOSIS — N39.0 URINARY TRACT INFECTION WITHOUT HEMATURIA, SITE UNSPECIFIED: ICD-10-CM

## 2018-02-14 LAB
SL AMB  POCT GLUCOSE, UA: ABNORMAL
SL AMB LEUKOCYTE ESTERASE,UA: ABNORMAL
SL AMB POCT BLOOD,UA: ABNORMAL
SL AMB POCT CLARITY,UA: CLEAR
SL AMB POCT COLOR,UA: YELLOW
SL AMB POCT KETONES,UA: ABNORMAL
SL AMB POCT NITRITE,UA: ABNORMAL
SL AMB POCT PH,UA: 7
SL AMB POCT URINE PROTEIN: ABNORMAL

## 2018-02-14 PROCEDURE — 81000 URINALYSIS NONAUTO W/SCOPE: CPT | Performed by: UROLOGY

## 2018-02-14 PROCEDURE — 99212 OFFICE O/P EST SF 10 MIN: CPT | Performed by: UROLOGY

## 2018-02-14 NOTE — PROGRESS NOTES
Progress Note - Urology  Nathalie Arguello 42 y o  female MRN: [id number]  Encounter: [id number]      Chief Complaint:   Chief Complaint   Patient presents with    Hydronephrosis     Go over procedure       HPI:   68-year-old female with obstructing stricture of the right ureter secondary to prior pelvic surgery from GYN procedure  She presents for discussion of options of management  She has had previous attempts to dilate the ureter with both hydrostatic dilation and laser incision of the strictured area  She had had indwelling stents  However when removed the hydronephrosis returned  She is now for definitive management with excision of the stricture and/or ureteral elodia cystotomy  MEDS:    Current Outpatient Prescriptions:     multivitamin (THERAGRAN) TABS, Take 1 tablet by mouth daily, Disp: , Rfl:       PMH:  Past Medical History:   Diagnosis Date    Migraine     PONV (postoperative nausea and vomiting)          ROS:  Review of Systems      Vitals:  Blood pressure 96/67, pulse 82, height 5' 5" (1 651 m), weight 59 kg (130 lb)        Physical Exam:     Clinical examination remains unchanged      Lab, Imaging and other studies:  Recent Results (from the past 48 hour(s))   POCT urine dip    Collection Time: 02/14/18  1:50 PM   Result Value Ref Range    LEUKOCYTE ESTERASE,UA +      NITRITE,UA NEG     SL AMB POCT URINE PROTEIN ++      PH,UA 7      BLOOD,UA +++      KETONES,UA NEG     GLUCOSE, UA NEG      COLOR,UA YELLOW      CLARITY,UA CLEAR          IMPRESSION:   right hydronephrosis secondary to ureteral stricture    PLAN:   repair right ureteral stricture / right ureteroneocystostomy

## 2018-02-21 ENCOUNTER — ANESTHESIA EVENT (OUTPATIENT)
Dept: PERIOP | Facility: HOSPITAL | Age: 38
DRG: 660 | End: 2018-02-21
Payer: COMMERCIAL

## 2018-02-22 ENCOUNTER — ANESTHESIA (OUTPATIENT)
Dept: PERIOP | Facility: HOSPITAL | Age: 38
DRG: 660 | End: 2018-02-22
Payer: COMMERCIAL

## 2018-02-22 ENCOUNTER — HOSPITAL ENCOUNTER (INPATIENT)
Facility: HOSPITAL | Age: 38
LOS: 3 days | Discharge: HOME | DRG: 660 | End: 2018-02-25
Attending: UROLOGY | Admitting: UROLOGY
Payer: COMMERCIAL

## 2018-02-22 DIAGNOSIS — Z01.818 PREOP EXAMINATION: ICD-10-CM

## 2018-02-22 DIAGNOSIS — N13.5 URETERAL STRICTURE, RIGHT: Primary | ICD-10-CM

## 2018-02-22 LAB
ABO GROUP BLD: NORMAL
BLD GP AB SCN SERPL QL: NEGATIVE
ERYTHROCYTE [DISTWIDTH] IN BLOOD BY AUTOMATED COUNT: 12.6 % (ref 11.6–15.1)
HCT VFR BLD AUTO: 29.6 % (ref 34.8–46.1)
HGB BLD-MCNC: 10.2 G/DL (ref 11.5–15.4)
MCH RBC QN AUTO: 30 PG (ref 26.8–34.3)
MCHC RBC AUTO-ENTMCNC: 34.5 G/DL (ref 31.4–37.4)
MCV RBC AUTO: 87 FL (ref 82–98)
PLATELET # BLD AUTO: 143 THOUSANDS/UL (ref 149–390)
PMV BLD AUTO: 10.5 FL (ref 8.9–12.7)
RBC # BLD AUTO: 3.4 MILLION/UL (ref 3.81–5.12)
RH BLD: POSITIVE
SPECIMEN EXPIRATION DATE: NORMAL
WBC # BLD AUTO: 10.33 THOUSAND/UL (ref 4.31–10.16)

## 2018-02-22 PROCEDURE — C1769 GUIDE WIRE: HCPCS | Performed by: UROLOGY

## 2018-02-22 PROCEDURE — 85027 COMPLETE CBC AUTOMATED: CPT | Performed by: UROLOGY

## 2018-02-22 PROCEDURE — 88307 TISSUE EXAM BY PATHOLOGIST: CPT | Performed by: UROLOGY

## 2018-02-22 PROCEDURE — 50780 REIMPLANT URETER IN BLADDER: CPT | Performed by: UROLOGY

## 2018-02-22 PROCEDURE — 0TB60ZZ EXCISION OF RIGHT URETER, OPEN APPROACH: ICD-10-PCS | Performed by: UROLOGY

## 2018-02-22 PROCEDURE — 52332 CYSTOSCOPY AND TREATMENT: CPT | Performed by: UROLOGY

## 2018-02-22 PROCEDURE — 86850 RBC ANTIBODY SCREEN: CPT | Performed by: PHYSICIAN ASSISTANT

## 2018-02-22 PROCEDURE — 0T160ZB BYPASS RIGHT URETER TO BLADDER, OPEN APPROACH: ICD-10-PCS | Performed by: UROLOGY

## 2018-02-22 PROCEDURE — 94760 N-INVAS EAR/PLS OXIMETRY 1: CPT

## 2018-02-22 PROCEDURE — 86901 BLOOD TYPING SEROLOGIC RH(D): CPT | Performed by: PHYSICIAN ASSISTANT

## 2018-02-22 PROCEDURE — 94762 N-INVAS EAR/PLS OXIMTRY CONT: CPT

## 2018-02-22 PROCEDURE — C2617 STENT, NON-COR, TEM W/O DEL: HCPCS | Performed by: UROLOGY

## 2018-02-22 PROCEDURE — 86900 BLOOD TYPING SEROLOGIC ABO: CPT | Performed by: PHYSICIAN ASSISTANT

## 2018-02-22 PROCEDURE — 0W9J30Z DRAINAGE OF PELVIC CAVITY WITH DRAINAGE DEVICE, PERCUTANEOUS APPROACH: ICD-10-PCS | Performed by: UROLOGY

## 2018-02-22 PROCEDURE — 0T760DZ DILATION OF RIGHT URETER WITH INTRALUMINAL DEVICE, OPEN APPROACH: ICD-10-PCS | Performed by: UROLOGY

## 2018-02-22 PROCEDURE — 0T9B80Z DRAINAGE OF BLADDER WITH DRAINAGE DEVICE, VIA NATURAL OR ARTIFICIAL OPENING ENDOSCOPIC: ICD-10-PCS | Performed by: UROLOGY

## 2018-02-22 DEVICE — STENT URETERAL 6FR 24CML INLAY OPTIMA: Type: IMPLANTABLE DEVICE | Site: URETER | Status: FUNCTIONAL

## 2018-02-22 RX ORDER — DEXTROSE MONOHYDRATE AND SODIUM CHLORIDE 5; .45 G/100ML; G/100ML
100 INJECTION, SOLUTION INTRAVENOUS CONTINUOUS
Status: DISCONTINUED | OUTPATIENT
Start: 2018-02-22 | End: 2018-02-25 | Stop reason: HOSPADM

## 2018-02-22 RX ORDER — FENTANYL CITRATE/PF 50 MCG/ML
25 SYRINGE (ML) INJECTION
Status: DISCONTINUED | OUTPATIENT
Start: 2018-02-22 | End: 2018-02-22 | Stop reason: HOSPADM

## 2018-02-22 RX ORDER — ONDANSETRON 2 MG/ML
4 INJECTION INTRAMUSCULAR; INTRAVENOUS EVERY 6 HOURS PRN
Status: DISCONTINUED | OUTPATIENT
Start: 2018-02-22 | End: 2018-02-25 | Stop reason: HOSPADM

## 2018-02-22 RX ORDER — MAGNESIUM HYDROXIDE 1200 MG/15ML
LIQUID ORAL AS NEEDED
Status: DISCONTINUED | OUTPATIENT
Start: 2018-02-22 | End: 2018-02-22 | Stop reason: HOSPADM

## 2018-02-22 RX ORDER — ALBUMIN, HUMAN INJ 5% 5 %
SOLUTION INTRAVENOUS CONTINUOUS PRN
Status: DISCONTINUED | OUTPATIENT
Start: 2018-02-22 | End: 2018-02-22 | Stop reason: SURG

## 2018-02-22 RX ORDER — ONDANSETRON 2 MG/ML
4 INJECTION INTRAMUSCULAR; INTRAVENOUS ONCE AS NEEDED
Status: DISCONTINUED | OUTPATIENT
Start: 2018-02-22 | End: 2018-02-22 | Stop reason: HOSPADM

## 2018-02-22 RX ORDER — BUPIVACAINE HYDROCHLORIDE 2.5 MG/ML
INJECTION, SOLUTION INFILTRATION; PERINEURAL AS NEEDED
Status: DISCONTINUED | OUTPATIENT
Start: 2018-02-22 | End: 2018-02-22 | Stop reason: SURG

## 2018-02-22 RX ORDER — PROPOFOL 10 MG/ML
INJECTION, EMULSION INTRAVENOUS CONTINUOUS PRN
Status: DISCONTINUED | OUTPATIENT
Start: 2018-02-22 | End: 2018-02-22 | Stop reason: SURG

## 2018-02-22 RX ORDER — SODIUM CHLORIDE, SODIUM LACTATE, POTASSIUM CHLORIDE, CALCIUM CHLORIDE 600; 310; 30; 20 MG/100ML; MG/100ML; MG/100ML; MG/100ML
100 INJECTION, SOLUTION INTRAVENOUS CONTINUOUS
Status: DISCONTINUED | OUTPATIENT
Start: 2018-02-22 | End: 2018-02-22

## 2018-02-22 RX ORDER — PROPOFOL 10 MG/ML
INJECTION, EMULSION INTRAVENOUS AS NEEDED
Status: DISCONTINUED | OUTPATIENT
Start: 2018-02-22 | End: 2018-02-22 | Stop reason: SURG

## 2018-02-22 RX ORDER — SCOLOPAMINE TRANSDERMAL SYSTEM 1 MG/1
1 PATCH, EXTENDED RELEASE TRANSDERMAL
Status: DISCONTINUED | OUTPATIENT
Start: 2018-02-22 | End: 2018-02-22 | Stop reason: HOSPADM

## 2018-02-22 RX ORDER — HEPARIN SODIUM 5000 [USP'U]/ML
5000 INJECTION, SOLUTION INTRAVENOUS; SUBCUTANEOUS EVERY 8 HOURS SCHEDULED
Status: DISCONTINUED | OUTPATIENT
Start: 2018-02-22 | End: 2018-02-25 | Stop reason: HOSPADM

## 2018-02-22 RX ORDER — MORPHINE SULFATE 0.5 MG/ML
INJECTION, SOLUTION EPIDURAL; INTRATHECAL; INTRAVENOUS AS NEEDED
Status: DISCONTINUED | OUTPATIENT
Start: 2018-02-22 | End: 2018-02-22 | Stop reason: SURG

## 2018-02-22 RX ORDER — FENTANYL CITRATE 50 UG/ML
INJECTION, SOLUTION INTRAMUSCULAR; INTRAVENOUS AS NEEDED
Status: DISCONTINUED | OUTPATIENT
Start: 2018-02-22 | End: 2018-02-22 | Stop reason: SURG

## 2018-02-22 RX ORDER — MIDAZOLAM HYDROCHLORIDE 1 MG/ML
INJECTION INTRAMUSCULAR; INTRAVENOUS AS NEEDED
Status: DISCONTINUED | OUTPATIENT
Start: 2018-02-22 | End: 2018-02-22 | Stop reason: SURG

## 2018-02-22 RX ORDER — GLYCOPYRROLATE 0.2 MG/ML
INJECTION INTRAMUSCULAR; INTRAVENOUS AS NEEDED
Status: DISCONTINUED | OUTPATIENT
Start: 2018-02-22 | End: 2018-02-22 | Stop reason: SURG

## 2018-02-22 RX ORDER — SODIUM CHLORIDE, SODIUM LACTATE, POTASSIUM CHLORIDE, CALCIUM CHLORIDE 600; 310; 30; 20 MG/100ML; MG/100ML; MG/100ML; MG/100ML
125 INJECTION, SOLUTION INTRAVENOUS CONTINUOUS
Status: DISCONTINUED | OUTPATIENT
Start: 2018-02-22 | End: 2018-02-22

## 2018-02-22 RX ORDER — BUPIVACAINE HYDROCHLORIDE 2.5 MG/ML
INJECTION, SOLUTION INFILTRATION; PERINEURAL AS NEEDED
Status: DISCONTINUED | OUTPATIENT
Start: 2018-02-22 | End: 2018-02-22

## 2018-02-22 RX ORDER — ROCURONIUM BROMIDE 10 MG/ML
INJECTION, SOLUTION INTRAVENOUS AS NEEDED
Status: DISCONTINUED | OUTPATIENT
Start: 2018-02-22 | End: 2018-02-22 | Stop reason: SURG

## 2018-02-22 RX ORDER — ONDANSETRON 2 MG/ML
INJECTION INTRAMUSCULAR; INTRAVENOUS AS NEEDED
Status: DISCONTINUED | OUTPATIENT
Start: 2018-02-22 | End: 2018-02-22 | Stop reason: SURG

## 2018-02-22 RX ORDER — LIDOCAINE HYDROCHLORIDE 10 MG/ML
INJECTION, SOLUTION INFILTRATION; PERINEURAL AS NEEDED
Status: DISCONTINUED | OUTPATIENT
Start: 2018-02-22 | End: 2018-02-22 | Stop reason: SURG

## 2018-02-22 RX ORDER — HEPARIN SODIUM 5000 [USP'U]/ML
5000 INJECTION, SOLUTION INTRAVENOUS; SUBCUTANEOUS EVERY 8 HOURS SCHEDULED
Status: DISCONTINUED | OUTPATIENT
Start: 2018-02-22 | End: 2018-02-22

## 2018-02-22 RX ORDER — DOCUSATE SODIUM 100 MG/1
100 CAPSULE, LIQUID FILLED ORAL 2 TIMES DAILY
Status: DISCONTINUED | OUTPATIENT
Start: 2018-02-23 | End: 2018-02-25 | Stop reason: HOSPADM

## 2018-02-22 RX ORDER — SODIUM CHLORIDE 9 MG/ML
INJECTION, SOLUTION INTRAVENOUS CONTINUOUS PRN
Status: DISCONTINUED | OUTPATIENT
Start: 2018-02-22 | End: 2018-02-22 | Stop reason: SURG

## 2018-02-22 RX ORDER — SODIUM CHLORIDE, SODIUM LACTATE, POTASSIUM CHLORIDE, CALCIUM CHLORIDE 600; 310; 30; 20 MG/100ML; MG/100ML; MG/100ML; MG/100ML
75 INJECTION, SOLUTION INTRAVENOUS CONTINUOUS
Status: DISCONTINUED | OUTPATIENT
Start: 2018-02-22 | End: 2018-02-22

## 2018-02-22 RX ADMIN — CEFAZOLIN SODIUM 1000 MG: 1 SOLUTION INTRAVENOUS at 16:53

## 2018-02-22 RX ADMIN — FENTANYL CITRATE 50 MCG: 50 INJECTION, SOLUTION INTRAMUSCULAR; INTRAVENOUS at 11:34

## 2018-02-22 RX ADMIN — FENTANYL CITRATE 100 MCG: 50 INJECTION, SOLUTION INTRAMUSCULAR; INTRAVENOUS at 08:02

## 2018-02-22 RX ADMIN — ROCURONIUM BROMIDE 20 MG: 10 INJECTION INTRAVENOUS at 09:42

## 2018-02-22 RX ADMIN — MORPHINE SULFATE 3 MG: 0.5 INJECTION, SOLUTION EPIDURAL; INTRATHECAL; INTRAVENOUS at 10:30

## 2018-02-22 RX ADMIN — LIDOCAINE HYDROCHLORIDE 50 MG: 10 INJECTION, SOLUTION INFILTRATION; PERINEURAL at 08:02

## 2018-02-22 RX ADMIN — ONDANSETRON 4 MG: 2 INJECTION INTRAMUSCULAR; INTRAVENOUS at 11:24

## 2018-02-22 RX ADMIN — METRONIDAZOLE 500 MG: 500 INJECTION, SOLUTION INTRAVENOUS at 08:07

## 2018-02-22 RX ADMIN — HEPARIN SODIUM 5000 UNITS: 5000 INJECTION, SOLUTION INTRAVENOUS; SUBCUTANEOUS at 22:12

## 2018-02-22 RX ADMIN — SCOPALAMINE 1 PATCH: 1 PATCH, EXTENDED RELEASE TRANSDERMAL at 07:01

## 2018-02-22 RX ADMIN — DEXTROSE AND SODIUM CHLORIDE 100 ML/HR: 5; 450 INJECTION, SOLUTION INTRAVENOUS at 13:00

## 2018-02-22 RX ADMIN — PROPOFOL 150 MG: 10 INJECTION, EMULSION INTRAVENOUS at 08:02

## 2018-02-22 RX ADMIN — GLYCOPYRROLATE 0.2 MG: 0.2 INJECTION, SOLUTION INTRAMUSCULAR; INTRAVENOUS at 07:58

## 2018-02-22 RX ADMIN — BUPIVACAINE HYDROCHLORIDE 5 ML: 2.5 INJECTION, SOLUTION EPIDURAL; INFILTRATION; INTRACAUDAL at 10:30

## 2018-02-22 RX ADMIN — SODIUM CHLORIDE, SODIUM LACTATE, POTASSIUM CHLORIDE, AND CALCIUM CHLORIDE: .6; .31; .03; .02 INJECTION, SOLUTION INTRAVENOUS at 10:48

## 2018-02-22 RX ADMIN — FENTANYL CITRATE 25 MCG: 50 INJECTION INTRAMUSCULAR; INTRAVENOUS at 12:20

## 2018-02-22 RX ADMIN — GLYCOPYRROLATE 0.3 MG: 0.2 INJECTION, SOLUTION INTRAMUSCULAR; INTRAVENOUS at 11:24

## 2018-02-22 RX ADMIN — FENTANYL CITRATE 50 MCG: 50 INJECTION, SOLUTION INTRAMUSCULAR; INTRAVENOUS at 11:36

## 2018-02-22 RX ADMIN — PROPOFOL 20 MG: 10 INJECTION, EMULSION INTRAVENOUS at 11:13

## 2018-02-22 RX ADMIN — MIDAZOLAM HYDROCHLORIDE 2 MG: 1 INJECTION, SOLUTION INTRAMUSCULAR; INTRAVENOUS at 07:58

## 2018-02-22 RX ADMIN — ROCURONIUM BROMIDE 20 MG: 10 INJECTION INTRAVENOUS at 08:52

## 2018-02-22 RX ADMIN — PROPOFOL 20 MG: 10 INJECTION, EMULSION INTRAVENOUS at 11:26

## 2018-02-22 RX ADMIN — ONDANSETRON 4 MG: 2 INJECTION INTRAMUSCULAR; INTRAVENOUS at 07:58

## 2018-02-22 RX ADMIN — PROPOFOL 30 MG: 10 INJECTION, EMULSION INTRAVENOUS at 08:03

## 2018-02-22 RX ADMIN — ROCURONIUM BROMIDE 50 MG: 10 INJECTION INTRAVENOUS at 08:03

## 2018-02-22 RX ADMIN — CEFAZOLIN SODIUM 2000 MG: 2 SOLUTION INTRAVENOUS at 08:00

## 2018-02-22 RX ADMIN — PROPOFOL 120 MCG/KG/MIN: 10 INJECTION, EMULSION INTRAVENOUS at 08:07

## 2018-02-22 RX ADMIN — DEXAMETHASONE SODIUM PHOSPHATE 5 MG: 10 INJECTION INTRAMUSCULAR; INTRAVENOUS at 08:19

## 2018-02-22 RX ADMIN — SODIUM CHLORIDE, SODIUM LACTATE, POTASSIUM CHLORIDE, AND CALCIUM CHLORIDE: .6; .31; .03; .02 INJECTION, SOLUTION INTRAVENOUS at 07:33

## 2018-02-22 RX ADMIN — ROCURONIUM BROMIDE 10 MG: 10 INJECTION INTRAVENOUS at 10:56

## 2018-02-22 RX ADMIN — ALBUMIN HUMAN: 0.05 INJECTION, SOLUTION INTRAVENOUS at 08:41

## 2018-02-22 RX ADMIN — ROPIVACAINE HYDROCHLORIDE: 5 INJECTION, SOLUTION EPIDURAL; INFILTRATION; PERINEURAL at 11:45

## 2018-02-22 RX ADMIN — FENTANYL CITRATE 25 MCG: 50 INJECTION INTRAMUSCULAR; INTRAVENOUS at 12:00

## 2018-02-22 RX ADMIN — NEOSTIGMINE METHYLSULFATE 4 MG: 1 INJECTION, SOLUTION INTRAMUSCULAR; INTRAVENOUS; SUBCUTANEOUS at 11:24

## 2018-02-22 RX ADMIN — SODIUM CHLORIDE: 0.9 INJECTION, SOLUTION INTRAVENOUS at 08:07

## 2018-02-22 RX ADMIN — DEXTROSE AND SODIUM CHLORIDE 100 ML/HR: 5; 450 INJECTION, SOLUTION INTRAVENOUS at 22:19

## 2018-02-22 RX ADMIN — ALBUMIN HUMAN: 0.05 INJECTION, SOLUTION INTRAVENOUS at 09:19

## 2018-02-22 NOTE — ANESTHESIA PROCEDURE NOTES
Epidural Block    Patient location during procedure: holding area  Start time: 2/22/2018 7:40 AM  Reason for block: post-op pain management  Staffing  Anesthesiologist: Harleen Guevara  Resident/CRNA: Ephraim Bernal  Performed: anesthesiologist   Preanesthetic Checklist  Completed: patient identified, surgical consent, pre-op evaluation, timeout performed, IV checked, risks and benefits discussed and monitors and equipment checked  Epidural  Patient position: sitting  Prep: Betadine  Patient monitoring: continuous pulse ox and frequent blood pressure checks  Approach: midline  Location: lumbar (1-5)  Injection technique: MARCUS air  Needle  Needle type: Tuohy   Needle gauge: 18 G  Test dose: negative and lidocaine 1 5% with epinephrine 1-to-200,000negative aspiration for CSF, negative aspiration for heme and no paresthesia on injection  patient tolerated the procedure well with no immediate complications

## 2018-02-22 NOTE — ANESTHESIA PREPROCEDURE EVALUATION
Review of Systems/Medical History  Patient summary reviewed  Chart reviewed  History of anesthetic complications PONV    Cardiovascular  Negative cardio ROS Exercise tolerance: good, Exercise comment: Able to climb two flights of stairs without cardiopulmonary limitation    Pulmonary  Negative pulmonary ROS Smoker:  Quit 10 years ago  ,        GI/Hepatic  Negative GI/hepatic ROS            Comment: Right ureteral stricture refractory to laser treatment via cystoscopy     Endo/Other  Negative endo/other ROS      GYN       Hematology  Negative hematology ROS      Musculoskeletal  Negative musculoskeletal ROS        Neurology    Headaches,    Psychology   Negative psychology ROS          Denies PONV after recent TIVAs  Physical Exam    Airway    Mallampati score: II  TM Distance: >3 FB  Neck ROM: full     Dental       Cardiovascular  Comment: Negative ROS, Rhythm: regular, No murmur,     Pulmonary  Breath sounds clear to auscultation,     Other Findings        Anesthesia Plan  ASA Score- 2     Anesthesia Type- general and epidural with ASA Monitors  Additional Monitors: arterial line  Airway Plan: ETT  Comment: TIVA for hx of PONV  Lumbar epidural for post-op pain management  Plan Factors-  Patient did not smoke on day of surgery  Induction- intravenous  Postoperative Plan- Plan for postoperative opioid use  Planned trial extubation    Informed Consent- Anesthetic plan and risks discussed with patient

## 2018-02-22 NOTE — PROGRESS NOTES
Patient states limited movement in left leg, and left leg feels like pins and needles,  aware, no new orders received, will continue to monitor

## 2018-02-22 NOTE — PROGRESS NOTES
Dr Francesca Delarosa at bedside, orders received to irrigate catheter with 45ml of sterile saline, catheter flushed and aspirated easily, no clots noted, Dr Francesca Delarosa aware, no new orders received

## 2018-02-22 NOTE — H&P (VIEW-ONLY)
HISTORY AND PHYSICAL       Patient Identifiers: Mc Wang (MRN: [id number])  Urology, Annie Bruno PA-C  Date of Service: 2/20/2018    History of Present Illness:     Mc Wang is a 42 y o   female with obstructing stricture of the right ureter secondary to prior pelvic surgery from GYN procedure  She presents for discussion of options of management  She has had previous attempts to dilate the ureter with both hydrostatic dilation and laser incision of the strictured area  She had had indwelling stents  However when removed the hydronephrosis returned  She is now for definitive management with excision of the stricture and/or ureteral elodia cystotomy  No fever or chills   No hematuria  Past Medical, Past Surgical History:     Past Medical History:   Diagnosis Date    Hydronephrosis     Migraine     PONV (postoperative nausea and vomiting)     Ureteral stricture    :    Past Surgical History:   Procedure Laterality Date    APPENDECTOMY      CYSTOSCOPY W/ LASER LITHOTRIPSY Right 11/2/2017    Procedure: CYSTOSCOPY;  RIGHT URETEROSCOPY WITH  HOLMIUM LASER INCISION OF URETERAL STRICTURE; (WITH HYDROSTATIC RIGHT URETERAL DILATION), BILATERAL RETROGRADE PYELOGRAM AND INSERTION OF RIGHT URETERAL STENT X 2 (4 7 X 26);   Surgeon: Xin Wade MD;  [facility];  Service: Urology    DILATION AND CURETTAGE OF UTERUS N/A 12/30/2016    Procedure: DILATATION AND CURETTAGE;  Surgeon: Jaycee Donnelly MD;  [facility];  Service:    Vibha Leisure DILATION AND EVACUATION      HYSTERECTOMY N/A 5/22/2017    Procedure: EXAM UNDER ANESTHESIA;  Surgeon: Jaycee Donnelly MD;  [facility];  Service:     HYSTEROSCOPY N/A 12/30/2016    Procedure: HYSTEROSCOPY;  Surgeon: Jaycee Donnelly MD;  [facility];  Service:    Vibha Leisure [place] CYSTOURETHROSCOPY,URETER CATHETER Bilateral 3/27/2017    Procedure: CYSTOSCOPY WITH RETROGRADE PYELOGRAM; RIGHT STENT INSERTION;  Surgeon: Xin Wade MD;  [facility];  Service: Urology    MI CYSTOURETHROSCOPY,URETER CATHETER N/A 1/11/2018    Procedure: CYSTOSCOPY , BILATERAL RETROGRADE PYELOGRAM WITH RIGHT STENT EXTRACTION;  Surgeon: Raisa Brown MD;  [facility];  Service: Urology    MI CYSTOURETHROSCOPY,URETER CATHETER Right 1/15/2018    Procedure: CYSTOSCOPY, RIGHT RETROGRADE PYELOGRAM WITH INSERTION OF RIGHT STENT URETERAL;  Surgeon: Raisa Brown MD;  [facility];  Service: Urology    MI LAPAROSCOPY W TOT HYSTERECT UTERUS 250 GRAM OR LESS N/A 3/24/2017    Procedure: TOTAL LAPAROSCOPIC HYSTERECTOMY   bilateral salpingectomy, cysto; Surgeon: Judi Tristan MD;  [facility];  Service: Gynecology   :    Medications, Allergies:     Current Outpatient Prescriptions:     multivitamin (THERAGRAN) TABS, Take 1 tablet by mouth daily, Disp: , Rfl:     Allergies: Allergies   Allergen Reactions    Shellfish-Derived Products Anaphylaxis     THROAT ITCHY MIGRAINES   :    Social and Family History:   Social History:   Social History   Substance Use Topics    Smoking status: Former Smoker     Packs/day: 0 20     Types: Cigarettes     Quit date: 2007    Smokeless tobacco: Never Used    Alcohol use 1 2 oz/week     2 Glasses of wine per week     History   Smoking Status    Former Smoker    Packs/day: 0 20    Types: Cigarettes    Quit date: 2007   Smokeless Tobacco    Never Used       Family History:  Family History   Problem Relation Age of Onset    Heart attack Mother     Hypertension Mother     Colon cancer Brother    :     Review of Systems:     General: Fever, chills, or night sweats: negative  Cardiac: Negative for chest pain  Pulmonary: Negative for shortness of breath  Gastrointestinal: Abdominal pain negative  Nausea, vomiting, or diarrhea negative,  Genitourinary: See HPI above  Patient does not have hematuria  All other systems queried were negative  Physical Exam:   General: Patient is pleasant and in NAD  Awake and alert  There were no vitals taken for this visit  Cardiac: regular rate  Peripheral edema: negative  Pulmonary: Non-labored breathing lungs clear bilateral  Abdomen: Soft, non-tender, non-distended  No surgical scars  No masses, tenderness, hernias noted  Genitourinary: Negative CVA tenderness, negative suprapubic tenderness  Labs:     Lab Results   Component Value Date    HGB 12 2 01/15/2018    HCT 37 1 01/15/2018    WBC 9 57 01/15/2018     01/15/2018   ]    Lab Results   Component Value Date     01/15/2018    K 4 2 01/15/2018     01/15/2018    CO2 28 01/15/2018    BUN 13 01/15/2018    CREATININE 0 82 01/15/2018    CALCIUM 8 3 01/15/2018    GLUCOSE 103 01/15/2018   ]    Imaging:   I personally reviewed the images and report of the following studies, and reviewed them with the patient:   CT ABDOMEN AND PELVIS WITHOUT IV CONTRAST - LOW DOSE RENAL STONE   IMPRESSION:     0 6 cm hyperdensity in the distal right ureter 1 cm proximal to the UVJ  Hemorrhagic clot favored over calculus  Moderate to severe upstream hydronephroureterosis slightly worse since January 11, 2018 accounting for differences in technique  Chronic right proximal ureteral stricture  Trace gas within the bladder and trace adjacent stranding attributed to recent intervention  Correlate with clinical parameters to exclude nonspecific cystitis         ASSESSMENT:   1  Obstructing right ureteral stricture with hydronephrosis    PLAN:   - open  reimplantation ureterocystotomy right side    Halley Luna PA-C

## 2018-02-22 NOTE — ANESTHESIA POSTPROCEDURE EVALUATION
Post-Op Assessment Note      CV Status:  Stable    Mental Status:  Alert and awake    Hydration Status:  Euvolemic    PONV Controlled:  Controlled    Airway Patency:  Patent    Post Op Vitals Reviewed: Yes          Staff: Anesthesiologist, CRNA     Post-op block assessment: catheter intact        BP 93/64 (02/22/18 1140)    Temp 98 3 °F (36 8 °C) (02/22/18 1140)    Pulse 79 (02/22/18 1140)   Resp 15 (02/22/18 1140)    SpO2 100 % (02/22/18 1140)

## 2018-02-22 NOTE — PERIOPERATIVE NURSING NOTE
Dr Shraddha Shah aware of SBP 80's-90's  No orders given at this time  Pt awake and alert when not asleep   at bedside

## 2018-02-22 NOTE — OP NOTE
OPERATIVE REPORT  PATIENT NAME: Pema Randle    :  1980  MRN: [id number]  Pt Location: [facility] OR ROOM 07    SURGERY DATE: 2018    Surgeon(s) and Role:     * Cee Poole MD - Primary     * Rodolfo Foss PA-C - Assisting    Preop Diagnosis:  Preop examination [Z01 818]   right hydronephrosis secondary to distal right ureteral stricture    Post-Op Diagnosis Codes:     * Preop examination [Z01 818]   same    Procedure(s) (LRB):  REIMPLANTATION URETEROCYSTOSTOMY (Right)     1  Right ureteroneocystostomy   2  Excision of right ureteral stricture   3  Insertion right ureteral stent    Specimen(s):  ID Type Source Tests Collected by Time Destination   1 : right ureteral stricture Tissue Ureter, Right TISSUE EXAM Cee Poole MD 2018 [id number]        Estimated Blood Loss:   400 mL    Drains:   tigre drain    Anesthesia Type:   General    Operative Indications:  Preop examination [Z01 818]   right ureteral stricture with hydronephrosis    Operative Findings:   distal right ureteral stricture    Complications:   None    Procedure and Technique:   this patient had undergone previous gynecologic surgery and developed a stricture of the distal ureter  Initial attempts were made to treat this with laser ureterotomy and hydrostatic dilatation followed by stenting  This was unsuccessful and she now presents for definitive management with a ureteroneocystostomy  Patient is aware of the procedure risk and complications inherent in this surgery  Patient brought to the operating room identified transferred to the operating room table  General anesthesia was administered  She was prepped with ChloraPrep and draped  Tejada catheter inserted  A confirmatory time-out was performed  Incision was then made transversely with a Pfannenstiel type incision  Sharp dissection was carried out through the subcutaneous tissue  Bleeders were cauterized as they appeared    The rectus muscle was then split in the midline and the lower abdomen was entered  The bowel was then packed with cephalad  The right ureter was isolated and identified  It was hydronephrotic  The ureter was then followed down into the pelvis  The stricture point was approximately 3-4 cm above the UV junction  The bladder was completely mobilized  The strictured area was then excised  It was elected to proceed with a spatula latest ureteroneocystostomy  The distal ureter was then suture ligated with 2 0 Vicryl  The strictured portion of the ureter was then excised  There was sufficient length of the dilated ureter to reach the bladder  The ureter was then spatulated  The bladder was opened and the posterior wall was identified and an cystotomy incision was then made  The speculated right ureter was then brought through the cystotomy incision  It was then sutured to the bladder in a full-thickness interrupted 4 0 Monocryl  A stent was inserted  This point the bladder could then be closed  It was closed in 3 layers  A continuous 4 0 Monocryl closing the mucosa  A 2 0 Monocryl closed the seromuscular layer in this was reinforced with a 3rd layer of interrupted 2 0 Monocryl  The packs were removed bowel returned to its anatomical position  A 10 Arabic have BRITTON drain was then inserted  The wound was then irrigated and drained  BRITTON drain was secured to the skin  The wound was closed the rectus muscle was reapproximated with interrupted 2 0 Vicryl the incision was then closed with a 1  PDS suture  Subcutaneous tissue was irrigated  The skin was closed with a subcuticular 4 Monocryl and a sealant with histacryl  She tolerated this well  Tejada drainage was pink tinted  She was awakened without incident and transferred to PACU in good condition  We would anticipate Tejada catheter to remain approximately 10-12 days  The stent will be removed in approximately 2-3 weeks     I was present for the entire procedure   no qualified resident was available for this surgery  The physician assistant's was need for retraction, suctioning, assistant with sutures, and wound closure    Patient Disposition:  PACU     SIGNATURE: Errol Cordova MD  DATE: February 22, 2018  TIME: 11:45 AM

## 2018-02-23 LAB
ALBUMIN SERPL BCP-MCNC: 2.7 G/DL (ref 3.5–5)
ALP SERPL-CCNC: 37 U/L (ref 46–116)
ALT SERPL W P-5'-P-CCNC: 8 U/L (ref 12–78)
ANION GAP SERPL CALCULATED.3IONS-SCNC: 5 MMOL/L (ref 4–13)
AST SERPL W P-5'-P-CCNC: 11 U/L (ref 5–45)
BASOPHILS # BLD AUTO: 0.01 THOUSANDS/ΜL (ref 0–0.1)
BASOPHILS NFR BLD AUTO: 0 % (ref 0–1)
BILIRUB SERPL-MCNC: 0.36 MG/DL (ref 0.2–1)
BUN SERPL-MCNC: 5 MG/DL (ref 5–25)
CALCIUM SERPL-MCNC: 7.6 MG/DL (ref 8.3–10.1)
CHLORIDE SERPL-SCNC: 109 MMOL/L (ref 100–108)
CO2 SERPL-SCNC: 27 MMOL/L (ref 21–32)
CREAT SERPL-MCNC: 0.67 MG/DL (ref 0.6–1.3)
EOSINOPHIL # BLD AUTO: 0.01 THOUSAND/ΜL (ref 0–0.61)
EOSINOPHIL NFR BLD AUTO: 0 % (ref 0–6)
ERYTHROCYTE [DISTWIDTH] IN BLOOD BY AUTOMATED COUNT: 12.7 % (ref 11.6–15.1)
GFR SERPL CREATININE-BSD FRML MDRD: 111 ML/MIN/1.73SQ M
GLUCOSE SERPL-MCNC: 115 MG/DL (ref 65–140)
HCT VFR BLD AUTO: 26.7 % (ref 34.8–46.1)
HGB BLD-MCNC: 9.1 G/DL (ref 11.5–15.4)
LYMPHOCYTES # BLD AUTO: 1.46 THOUSANDS/ΜL (ref 0.6–4.47)
LYMPHOCYTES NFR BLD AUTO: 14 % (ref 14–44)
MCH RBC QN AUTO: 29.7 PG (ref 26.8–34.3)
MCHC RBC AUTO-ENTMCNC: 34.1 G/DL (ref 31.4–37.4)
MCV RBC AUTO: 87 FL (ref 82–98)
MONOCYTES # BLD AUTO: 0.96 THOUSAND/ΜL (ref 0.17–1.22)
MONOCYTES NFR BLD AUTO: 9 % (ref 4–12)
NEUTROPHILS # BLD AUTO: 7.76 THOUSANDS/ΜL (ref 1.85–7.62)
NEUTS SEG NFR BLD AUTO: 77 % (ref 43–75)
NRBC BLD AUTO-RTO: 0 /100 WBCS
PLATELET # BLD AUTO: 157 THOUSANDS/UL (ref 149–390)
PMV BLD AUTO: 10.9 FL (ref 8.9–12.7)
POTASSIUM SERPL-SCNC: 3.9 MMOL/L (ref 3.5–5.3)
PROT SERPL-MCNC: 5 G/DL (ref 6.4–8.2)
RBC # BLD AUTO: 3.06 MILLION/UL (ref 3.81–5.12)
SODIUM SERPL-SCNC: 141 MMOL/L (ref 136–145)
WBC # BLD AUTO: 10.22 THOUSAND/UL (ref 4.31–10.16)

## 2018-02-23 PROCEDURE — 85025 COMPLETE CBC W/AUTO DIFF WBC: CPT | Performed by: PHYSICIAN ASSISTANT

## 2018-02-23 PROCEDURE — 80053 COMPREHEN METABOLIC PANEL: CPT | Performed by: PHYSICIAN ASSISTANT

## 2018-02-23 PROCEDURE — 94762 N-INVAS EAR/PLS OXIMTRY CONT: CPT

## 2018-02-23 RX ORDER — DIPHENHYDRAMINE HYDROCHLORIDE 50 MG/ML
25 INJECTION INTRAMUSCULAR; INTRAVENOUS EVERY 6 HOURS PRN
Status: DISCONTINUED | OUTPATIENT
Start: 2018-02-23 | End: 2018-02-25 | Stop reason: HOSPADM

## 2018-02-23 RX ORDER — ONDANSETRON 2 MG/ML
4 INJECTION INTRAMUSCULAR; INTRAVENOUS EVERY 4 HOURS PRN
Status: DISCONTINUED | OUTPATIENT
Start: 2018-02-23 | End: 2018-02-25 | Stop reason: HOSPADM

## 2018-02-23 RX ADMIN — HEPARIN SODIUM 5000 UNITS: 5000 INJECTION, SOLUTION INTRAVENOUS; SUBCUTANEOUS at 21:59

## 2018-02-23 RX ADMIN — ROPIVACAINE HYDROCHLORIDE: 5 INJECTION, SOLUTION EPIDURAL; INFILTRATION; PERINEURAL at 13:28

## 2018-02-23 RX ADMIN — HEPARIN SODIUM 5000 UNITS: 5000 INJECTION, SOLUTION INTRAVENOUS; SUBCUTANEOUS at 05:23

## 2018-02-23 RX ADMIN — DIPHENHYDRAMINE HYDROCHLORIDE 25 MG: 50 INJECTION, SOLUTION INTRAMUSCULAR; INTRAVENOUS at 22:00

## 2018-02-23 RX ADMIN — DOCUSATE SODIUM 100 MG: 100 CAPSULE, LIQUID FILLED ORAL at 08:24

## 2018-02-23 RX ADMIN — CEFAZOLIN SODIUM 1000 MG: 1 SOLUTION INTRAVENOUS at 08:24

## 2018-02-23 RX ADMIN — CEFAZOLIN SODIUM 1000 MG: 1 SOLUTION INTRAVENOUS at 00:58

## 2018-02-23 RX ADMIN — DOCUSATE SODIUM 100 MG: 100 CAPSULE, LIQUID FILLED ORAL at 17:16

## 2018-02-23 RX ADMIN — HEPARIN SODIUM 5000 UNITS: 5000 INJECTION, SOLUTION INTRAVENOUS; SUBCUTANEOUS at 13:35

## 2018-02-23 RX ADMIN — DEXTROSE AND SODIUM CHLORIDE 100 ML/HR: 5; 450 INJECTION, SOLUTION INTRAVENOUS at 19:48

## 2018-02-23 NOTE — PLAN OF CARE
Problem: DISCHARGE PLANNING - CARE MANAGEMENT  Goal: Discharge to post-acute care or home with appropriate resources  INTERVENTIONS:  - Conduct assessment to determine patient/family and health care team treatment goals, and need for post-acute services based on payer coverage, community resources, and patient preferences, and barriers to discharge  - Address psychosocial, clinical, and financial barriers to discharge as identified in assessment in conjunction with the patient/family and health care team  - Arrange appropriate level of post-acute services according to patient's   needs and preference and payer coverage in collaboration with the physician and health care team  - Communicate with and update the patient/family, physician, and health care team regarding progress on the discharge plan  - Arrange appropriate transportation to post-acute venues  -Plan to d/c home  Outcome: Progressing

## 2018-02-23 NOTE — CONSULTS
Consultation - Acute Pain Management   Adarsh Clement 42 y o  female MRN: [id number]  Unit/Bed#: [facility] 821-01 Encounter: [id number]               Assessment/Plan     Assessment: 42 y o  Female POD 1  S/P REIMPLANTATION URETEROCYSTOSTOMY (Right),  1  Right ureteroneocystostomy  2  Excision of right ureteral stricture  3  Insertion right ureteral stent    Plan   1  Acute surgical pain- pain is controlled  Patient rates pain 02/10  Patient states she has good relief with epidural button  Epidural dressing is clean dry and intact  Continue pain management with epidural as per her primary team   2  Spoke to nurse and primary team, will continue to follow patient          History of Present Illness    Admit Date:  2/22/2018  Hospital Day:  1 day  Primary Service:  Urology  Attending Provider:  Jeronimo Gong MD  Physician Requesting Consult: Jeronimo Gong MD  Reason for Consult / Principal Problem: acute surgical pain    HPI: Adarsh Clement is a 42y o  year old female who presents POD 1 S/P  Right ureteroneocystostomy,Excision of right ureteral stricture, AND Insertion right ureteral stent  In room with patient, patient in bed resting  Patient states the pain is well-controlled but had some concerns about what the pain would be like when the epidural was removed  Patient requested to have epidural removed today so she could prepare for her pain needs at home  After speaking with Gala MELO, I informed the patient on the benefits of keeping epidural for acute surgical pain control and reassessing discontinuing tomorrow  Patient was agreeable to plan  Denies any numbness or tingling in all extremities  Inpatient consult to Acute Pain Service  Consult performed by: NISA ALONSO [facility], [address] ordered by: Seth Orellana          Review of Systems   Constitutional: Negative for diaphoresis  Respiratory: Negative for cough, chest tightness and shortness of breath      Cardiovascular: Negative for chest pain and palpitations  Skin: Negative for color change  Neurological: Negative for dizziness and light-headedness  Psychiatric/Behavioral: Negative for agitation  Pain History:     Current pain location(s): mid abdomen   Pain Scale:   0-2/10  Current Analgesic regimen:    ropivacaine 0 1% and fentaNYL 2 mcg/mL PCEA    Historical Information   Past Medical History:   Diagnosis Date    Hydronephrosis     Migraine     PONV (postoperative nausea and vomiting)     Ureteral stricture      Past Surgical History:   Procedure Laterality Date    APPENDECTOMY      CYSTOSCOPY W/ LASER LITHOTRIPSY Right 11/2/2017    Procedure: CYSTOSCOPY;  RIGHT URETEROSCOPY WITH  HOLMIUM LASER INCISION OF URETERAL STRICTURE; (WITH HYDROSTATIC RIGHT URETERAL DILATION), BILATERAL RETROGRADE PYELOGRAM AND INSERTION OF RIGHT URETERAL STENT X 2 (4 7 X 26);   Surgeon: Al Montenegro MD;  [facility];  Service: Urology    DILATION AND CURETTAGE OF UTERUS N/A 12/30/2016    Procedure: DILATATION AND CURETTAGE;  Surgeon: Tenzin Santos MD;  [facility];  Service:    [facility] Jessica N/A 5/22/2017    Procedure: EXAM UNDER ANESTHESIA;  Surgeon: Tenzin Santos MD;  [facility];  Service:     HYSTEROSCOPY N/A 12/30/2016    Procedure: HYSTEROSCOPY;  Surgeon: Tenzin Santos MD;  [facility];  Service:    [place] CYSTOURETHROSCOPY,URETER CATHETER Bilateral 3/27/2017    Procedure: CYSTOSCOPY WITH RETROGRADE PYELOGRAM; RIGHT STENT INSERTION;  Surgeon: Al Montenegro MD;  [facility];  Service: Urology    TX CYSTOURETHROSCOPY,URETER CATHETER N/A 1/11/2018    Procedure: CYSTOSCOPY , BILATERAL RETROGRADE PYELOGRAM WITH RIGHT STENT EXTRACTION;  Surgeon: Al Montenegro MD;  [facility];  Service: Urology    TX CYSTOURETHROSCOPY,URETER CATHETER Right 1/15/2018    Procedure: CYSTOSCOPY, RIGHT RETROGRADE PYELOGRAM WITH INSERTION OF RIGHT STENT URETERAL;  Surgeon: Rona Junior Светлана Catalan MD;  [facility];  Service: Urology    NV LAPAROSCOPY W TOT HYSTERECT UTERUS 250 GRAM OR LESS N/A 3/24/2017    Procedure: TOTAL LAPAROSCOPIC HYSTERECTOMY   bilateral salpingectomy, cysto; Surgeon: Nusrat Lee MD;  [facility];  Service: Gynecology    REIMPLANT URETER IN BLADDER Right 2/22/2018    Procedure: URETERAL NEOCYSTOSTOMY, EXCISION OF URETERAL STRICTURE ;  Surgeon: Angelo Arana MD;  [facility];  Service: Urology    URETERAL STENT PLACEMENT Right 2/22/2018    Procedure: INSERTION STENT URETERAL;  Surgeon: Angelo Arana MD;  [facility];  Service: Urology     Social History   History   Alcohol Use    1 2 oz/week    2 Glasses of wine per week     History   Drug Use No     History   Smoking Status    Former Smoker    Packs/day: 0 20    Types: Cigarettes    Quit date: 2007   Smokeless Tobacco    Never Used     Family History: non-contributory    Meds/Allergies   all current active meds have been reviewed        Allergies   Allergen Reactions    Shellfish-Derived Products Anaphylaxis     THROAT ITCHY MIGRAINES       Objective   Temp:  [97 2 °F (36 2 °C)-99 1 °F (37 3 °C)] 98 7 °F (37 1 °C)  HR:  [52-86] 81  Resp:  [8-21] 16  BP: ()/(48-68) 90/53    Intake/Output Summary (Last 24 hours) at 02/23/18 1122  Last data filed at 02/23/18 0600   Gross per 24 hour   Intake          3990 25 ml   Output             1775 ml   Net          2215 25 ml       Physical Exam   Constitutional: She is oriented to person, place, and time  She appears well-developed and well-nourished  No distress  HENT:   Head: Normocephalic  Eyes: EOM are normal  Pupils are equal, round, and reactive to light  Neck: Normal range of motion  Cardiovascular: Normal rate  Pulmonary/Chest: Effort normal  No respiratory distress  Abdominal: She exhibits no distension  There is tenderness  Musculoskeletal: Normal range of motion     Neurological: She is alert and oriented to person, place, and time  Skin: Skin is warm and dry  She is not diaphoretic  EPIDURAL DRESSING CLEAN DRY AND INTACT    Psychiatric: She has a normal mood and affect  Lab Results: I have personally reviewed pertinent labs  Imaging Studies: I have personally reviewed pertinent reports  EKG, Pathology, and Other Studies: I have personally reviewed pertinent reports  Counseling / Coordination of Care  Total floor / unit time spent today 15minutes  Greater than 50% of total time was spent with the patient and / or family counseling and / or coordination of care

## 2018-02-23 NOTE — PROGRESS NOTES
UROLOGY PROGRESS NOTE   Patient Identifiers: Yoko Emanuel (MRN [id number])  Date of Service: 2/23/2018    Subjective:    Doing well post op , pain well controled, anxious to eat, will start clears and OOB    Patient has  no complaints        Objective:     VITALS:    Vitals:    02/23/18 0744   BP:    Pulse:    Resp:    Temp:    SpO2: 98%           LABS:  Lab Results   Component Value Date    HGB 9 1 (L) 02/23/2018    HCT 26 7 (L) 02/23/2018    WBC 10 22 (H) 02/23/2018     02/23/2018   ]    Lab Results   Component Value Date     02/23/2018    K 3 9 02/23/2018     (H) 02/23/2018    CO2 27 02/23/2018    BUN 5 02/23/2018    CREATININE 0 67 02/23/2018    CALCIUM 7 6 (L) 02/23/2018    GLUCOSE 115 02/23/2018   ]    INPATIENT MEDS:    Current Facility-Administered Medications:     ceFAZolin (ANCEF) IVPB (premix) 1,000 mg, 1,000 mg, Intravenous, Q8H, Isma Khanna MD, Stopped at 02/23/18 0128    dextrose 5 % and sodium chloride 0 45 % infusion, 100 mL/hr, Intravenous, Continuous, Elizabeth Cantu PA-C, Last Rate: 100 mL/hr at 02/22/18 2219, 100 mL/hr at 02/22/18 2219    docusate sodium (COLACE) capsule 100 mg, 100 mg, Oral, BID, Isma Khanna MD    heparin (porcine) subcutaneous injection 5,000 Units, 5,000 Units, Subcutaneous, Q8H [address], 5,000 Units at 02/23/18 0523 **AND** Platelet count, , , Once, Elizabeth Cantu PA-C    ondansetron (ZOFRAN) injection 4 mg, 4 mg, Intravenous, Q6H PRN, Isma Khanna MD    ondansetron [facility]) injection 4 mg, 4 mg, Intravenous, Q6H PRN, Elizabeth Cantu PA-C    ropivacaine 0 1% and fentaNYL 2 mcg/mL PCEA, , Epidural, Continuous, Anette Amaral MD      Physical Exam:   BP 99/52 (BP Location: Right arm)   Pulse 85   Temp 98 1 °F (36 7 °C) (Oral)   Resp 16   Ht 5' 5" (1 651 m)   Wt 59 kg (130 lb)   SpO2 98%   BMI 21 63 kg/m²   GEN: no acute distress    RESP: breathing comfortably with no accessory muscle use    ABD: soft, appropriately tender to palpation, non-distended   INCISION: clean, dry, intact   EXT: no significant peripheral edema     MAC: in place draining clear yellow urine  no clots and       RADIOLOGY:   none    Assessment:   1  POD # 1 REIMPLANTATION URETEROCYSTOSTOMY (Right)   2   Right ureteral stricture with hydronephrosis    Plan:   - clear liquids  - OOB and ambulate  - pain control adequate  - BRITTON drain remains  - AM labs  -  -  -

## 2018-02-23 NOTE — CASE MANAGEMENT
Thank you,  [address] [facility] in the [facility] by [address] for 2017  Network Utilization Review Department  Phone: [phone]; Fax [phone]  ATTENTION: The Network Utilization Review Department is now centralized for our 7 Facilities  Make a note that we have a new phone and fax numbers for our Department  Please call with any questions or concerns to [phone] and carefully follow the prompts so that you are directed to the right person  All voicemails are confidential  Fax any determinations, approvals, denials, and requests for initial or continue stay review clinical to [phone]  Due to HIGH CALL volume, it would be easier if you could please send faxed requests to expedite your requests and in part, help us provide discharge notifications faster  Initial Clinical Review    Age/Sex: 42 y o  female    Surgery Date: 18    Procedure:   OPERATIVE REPORT  PATIENT NAME: Alease Goodell    :  1980  MRN: [id number]  Pt Location: [facility] OR ROOM      SURGERY DATE: 2018     Surgeon(s) and Role:     * Aneul Rao MD - Primary     * Governor YOSSI Wiseman - Assisting     Preop Diagnosis:  Preop examination [Z01 818]   right hydronephrosis secondary to distal right ureteral stricture     Post-Op Diagnosis Codes:     * Preop examination [Z01 818]   same     Procedure(s) (LRB):  REIMPLANTATION URETEROCYSTOSTOMY (Right)      1  Right ureteroneocystostomy   2  Excision of right ureteral stricture   3   Insertion right ureteral stent     Specimen(s):  ID Type Source Tests Collected by Time Destination   1 : right ureteral stricture Tissue Ureter, Right TISSUE EXAM Anuel Rao MD 2018           Estimated Blood Loss:   400 mL     Drains:   tigre drain     Anesthesia Type:   General     Operative Indications:  Preop examination [Z01 818]   right ureteral stricture with hydronephrosis     Operative Findings:   distal right ureteral stricture     Complications:   None     Procedure and Technique:   this patient had undergone previous gynecologic surgery and developed a stricture of the distal ureter  Initial attempts were made to treat this with laser ureterotomy and hydrostatic dilatation followed by stenting  This was unsuccessful and she now presents for definitive management with a ureteroneocystostomy  Patient is aware of the procedure risk and complications inherent in this surgery      Patient brought to the operating room identified transferred to the operating room table  General anesthesia was administered  She was prepped with ChloraPrep and draped  Tejada catheter inserted  A confirmatory time-out was performed  Incision was then made transversely with a Pfannenstiel type incision  Sharp dissection was carried out through the subcutaneous tissue  Bleeders were cauterized as they appeared  The rectus muscle was then split in the midline and the lower abdomen was entered  The bowel was then packed with cephalad  The right ureter was isolated and identified  It was hydronephrotic  The ureter was then followed down into the pelvis  The stricture point was approximately 3-4 cm above the UV junction  The bladder was completely mobilized  The strictured area was then excised  It was elected to proceed with a spatula latest ureteroneocystostomy  The distal ureter was then suture ligated with 2 0 Vicryl  The strictured portion of the ureter was then excised  There was sufficient length of the dilated ureter to reach the bladder  The ureter was then spatulated  The bladder was opened and the posterior wall was identified and an cystotomy incision was then made  The speculated right ureter was then brought through the cystotomy incision  It was then sutured to the bladder in a full-thickness interrupted 4 0 Monocryl  A stent was inserted  This point the bladder could then be closed  It was closed in 3 layers    A continuous 4 0 Monocryl closing the mucosa  A 2 0 Monocryl closed the seromuscular layer in this was reinforced with a 3rd layer of interrupted 2 0 Monocryl  The packs were removed bowel returned to its anatomical position  A 10 [place]ovan have BRITTON drain was then inserted  The wound was then irrigated and drained  BRITTON drain was secured to the skin  The wound was closed the rectus muscle was reapproximated with interrupted 2 0 Vicryl the incision was then closed with a 1  PDS suture  Subcutaneous tissue was irrigated  The skin was closed with a subcuticular 4 Monocryl and a sealant with histacryl  She tolerated this well  Tejada drainage was pink tinted  She was awakened without incident and transferred to PACU in good condition  We would anticipate Tejada catheter to remain approximately 10-12 days  The stent will be removed in approximately 2-3 weeks  I was present for the entire procedure   no qualified resident was available for this surgery  The physician assistant's was need for retraction, suctioning, assistant with sutures, and wound closure  Patient Disposition:  PACU      SIGNATURE: Natalie Ramirez MD  DATE: February 22, 2018  TIME: 11:45 AM    Anesthesia: General     Admission Orders: Date/Time/Statement: 2/22/18 @ 1492     Orders Placed This Encounter   Procedures    Inpatient Admission     Standing Status:   Standing     Number of Occurrences:   1     Order Specific Question:   Admitting Physician     Answer:   June Flatness     Order Specific Question:   Level of Care     Answer:   Med Surg [16]     Order Specific Question:   Estimated length of stay     Answer:   More than 2 Midnights     Order Specific Question:   Certification     Answer:   I certify that inpatient services are medically necessary for this patient for a duration of greater than two midnights  See H&P and MD Progress Notes for additional information about the patient's course of treatment         Vital Signs: BP 90/53 (BP Location: Left arm)   Pulse 81   Temp 98 7 °F (37 1 °C) (Oral)   Resp 16   Ht 5' 5" (1 651 m)   Wt 59 kg (130 lb)   SpO2 97%   BMI 21 63 kg/m²     Diet:        Diet Orders            Start     Ordered    02/23/18 0801  Diet Surgical; Clear Liquid; No Carbonation  Diet effective now     Question Answer Comment   Diet Type Surgical    Surgical Clear Liquid    Other Restriction(s): No Carbonation    RD to adjust diet per protocol? Yes        02/23/18 0801        Mobility:   Strict bedrest     DVT Prophylaxis:   SCDs  Heparin 5000 u Sq     Pain Control:    Continuous Infusions:  dextrose 5 % and sodium chloride 0 45 % 100 mL/hr Last Rate: 100 mL/hr (02/22/18 2219)   ropivacaine 0 1% and fentaNYL 2 mcg/mL PCEA      PRN Meds: ondansetron    ondansetron  _________________________  2/23 Urology Progress Note  1  POD # 1 REIMPLANTATION URETEROCYSTOSTOMY (Right)   2  Right ureteral stricture with hydronephrosis     Plan:   - clear liquids  - OOB and ambulate  - pain control adequate  - BRITTON drain remains  - AM labs  _____________________________ 2/23 Acute Pain Management Consult   Assessment: 42 y o  Female POD 1  S/P REIMPLANTATION URETEROCYSTOSTOMY (Right),  1  Right ureteroneocystostomy  2  Excision of right ureteral stricture  3  Insertion right ureteral stent     Plan   1  Acute surgical pain- pain is controlled  Patient rates pain 02/10  Patient states she has good relief with epidural button  Epidural dressing is clean dry and intact    Continue pain management with epidural as per her primary team   2  Spoke to nurse and primary team, will continue to follow patient

## 2018-02-23 NOTE — SOCIAL WORK
CM met with patient at bedside  Pt lives in a 2-story home with  Kojo Moss [phone] and children  2 RUBEN  Pt was independent with ADLs PTA  No DME  No history of HHC or STR  Pt states that her  or mother will provide transport upon d/c  Preferred Rx [facility] in [place]  No POA  No history of D/A or MH issues reported  CM reviewed d/c planning process including the following: identifying help at home, patient preference for d/c planning needs, Discharge Lounge, [facility] Meds to Bed program, availability of treatment team to discuss questions or concerns patient and/or family may have regarding understanding medications and recognizing signs and symptoms once discharged  CM also encouraged patient to follow up with all recommended appointments after discharge  Patient advised of importance for patient and family to participate in managing patients medical well being

## 2018-02-24 LAB
ANION GAP SERPL CALCULATED.3IONS-SCNC: 7 MMOL/L (ref 4–13)
BUN SERPL-MCNC: 3 MG/DL (ref 5–25)
CALCIUM SERPL-MCNC: 7.6 MG/DL (ref 8.3–10.1)
CHLORIDE SERPL-SCNC: 110 MMOL/L (ref 100–108)
CO2 SERPL-SCNC: 24 MMOL/L (ref 21–32)
CREAT SERPL-MCNC: 0.55 MG/DL (ref 0.6–1.3)
ERYTHROCYTE [DISTWIDTH] IN BLOOD BY AUTOMATED COUNT: 12.8 % (ref 11.6–15.1)
GFR SERPL CREATININE-BSD FRML MDRD: 119 ML/MIN/1.73SQ M
GLUCOSE SERPL-MCNC: 90 MG/DL (ref 65–140)
HCT VFR BLD AUTO: 25.7 % (ref 34.8–46.1)
HGB BLD-MCNC: 8.6 G/DL (ref 11.5–15.4)
MCH RBC QN AUTO: 29.9 PG (ref 26.8–34.3)
MCHC RBC AUTO-ENTMCNC: 33.5 G/DL (ref 31.4–37.4)
MCV RBC AUTO: 89 FL (ref 82–98)
PLATELET # BLD AUTO: 135 THOUSANDS/UL (ref 149–390)
PMV BLD AUTO: 10.8 FL (ref 8.9–12.7)
POTASSIUM SERPL-SCNC: 4.2 MMOL/L (ref 3.5–5.3)
RBC # BLD AUTO: 2.88 MILLION/UL (ref 3.81–5.12)
SODIUM SERPL-SCNC: 141 MMOL/L (ref 136–145)
WBC # BLD AUTO: 6.2 THOUSAND/UL (ref 4.31–10.16)

## 2018-02-24 PROCEDURE — 85027 COMPLETE CBC AUTOMATED: CPT | Performed by: PHYSICIAN ASSISTANT

## 2018-02-24 PROCEDURE — 80048 BASIC METABOLIC PNL TOTAL CA: CPT | Performed by: PHYSICIAN ASSISTANT

## 2018-02-24 RX ORDER — OXYCODONE HYDROCHLORIDE AND ACETAMINOPHEN 5; 325 MG/1; MG/1
1 TABLET ORAL EVERY 4 HOURS PRN
Status: DISCONTINUED | OUTPATIENT
Start: 2018-02-24 | End: 2018-02-25 | Stop reason: HOSPADM

## 2018-02-24 RX ADMIN — DEXTROSE AND SODIUM CHLORIDE 100 ML/HR: 5; 450 INJECTION, SOLUTION INTRAVENOUS at 05:16

## 2018-02-24 RX ADMIN — DOCUSATE SODIUM 100 MG: 100 CAPSULE, LIQUID FILLED ORAL at 17:41

## 2018-02-24 RX ADMIN — HEPARIN SODIUM 5000 UNITS: 5000 INJECTION, SOLUTION INTRAVENOUS; SUBCUTANEOUS at 21:37

## 2018-02-24 RX ADMIN — OXYCODONE HYDROCHLORIDE AND ACETAMINOPHEN 1 TABLET: 5; 325 TABLET ORAL at 21:40

## 2018-02-24 RX ADMIN — HEPARIN SODIUM 5000 UNITS: 5000 INJECTION, SOLUTION INTRAVENOUS; SUBCUTANEOUS at 05:13

## 2018-02-24 RX ADMIN — DOCUSATE SODIUM 100 MG: 100 CAPSULE, LIQUID FILLED ORAL at 09:05

## 2018-02-24 RX ADMIN — DEXTROSE AND SODIUM CHLORIDE 100 ML/HR: 5; 450 INJECTION, SOLUTION INTRAVENOUS at 15:17

## 2018-02-24 RX ADMIN — HEPARIN SODIUM 5000 UNITS: 5000 INJECTION, SOLUTION INTRAVENOUS; SUBCUTANEOUS at 15:18

## 2018-02-24 NOTE — PROGRESS NOTES
UROLOGY PROGRESS NOTE   Patient Identifiers: Adarsh Clement (MRN [id number])  Date of Service: 2/24/2018    Subjective:    No flatus  Walked in the herrera twice yesterday    Patient has  complaints of pain 0-2/10         Objective:     VITALS:    Vitals:    02/24/18 0700   BP: 103/50   Pulse: 86   Resp: 16   Temp: 98 °F (36 7 °C)   SpO2: 94%           LABS:  Lab Results   Component Value Date    HGB 8 6 (L) 02/24/2018    HCT 25 7 (L) 02/24/2018    WBC 6 20 02/24/2018     (L) 02/24/2018   ]    Lab Results   Component Value Date     02/24/2018    K 4 2 02/24/2018     (H) 02/24/2018    CO2 24 02/24/2018    BUN 3 (L) 02/24/2018    CREATININE 0 55 (L) 02/24/2018    CALCIUM 7 6 (L) 02/24/2018    GLUCOSE 90 02/24/2018   ]    INPATIENT MEDS:    Current Facility-Administered Medications:     dextrose 5 % and sodium chloride 0 45 % infusion, 100 mL/hr, Intravenous, Continuous, Elizabeth Cantu PA-C, Last Rate: 100 mL/hr at 02/24/18 0516, 100 mL/hr at 02/24/18 0516    diphenhydrAMINE (BENADRYL) injection 25 mg, 25 mg, Intravenous, Q6H PRN, Jose Carlos Fry MD, 25 mg at 02/23/18 2200    docusate sodium (COLACE) capsule 100 mg, 100 mg, Oral, BID, Jeronimo Gong MD, 100 mg at 02/24/18 0905    heparin (porcine) subcutaneous injection 5,000 Units, 5,000 Units, Subcutaneous, Q8H [address], 5,000 Units at 02/24/18 0513 **AND** Platelet count, , , Once, Elizabeth Cantu PA-C    ondansetron (ZOFRAN) injection 4 mg, 4 mg, Intravenous, Q6H PRN, Jeronimo Gong MD    ondansetron [facility]) injection 4 mg, 4 mg, Intravenous, Q6H PRN, Elizabeth Cantu PA-C    ondansetron (ZOFRAN) injection 4 mg, 4 mg, Intravenous, Q4H PRN, Jose Carlos Fry MD    ropivacaine 0 1% and fentaNYL 2 mcg/mL PCEA, , Epidural, Continuous, Yu Saeed MD      Physical Exam:   /50 (BP Location: Left arm)   Pulse 86   Temp 98 °F (36 7 °C) (Oral)   Resp 16   Ht 5' 5" (1 651 m)   Wt 59 kg (130 lb)   SpO2 94%   BMI 21 63 kg/m²   GEN: no acute distress    RESP: breathing comfortably with no accessory muscle use    ABD: soft, appropriately tender to palpation, non-distended   INCISION: clean, dry, intact  Drain intact with 20cc  output  EXT: no significant peripheral edema     RICE: in place draining clear yellow urine  no clots and       RADIOLOGY:   none     Assessment:   1  POD # 2 REIMPLANTATION URETEROCYSTOSTOMY (Right)   2  Right ureteral stricture with hydronephrosis  3   Expected ABLA    Plan:   - Dc epidural  - advance diet  - dc drain tomorrow  And possibly home with rice  -  -  -

## 2018-02-25 VITALS
OXYGEN SATURATION: 98 % | BODY MASS INDEX: 21.66 KG/M2 | WEIGHT: 130 LBS | HEIGHT: 65 IN | TEMPERATURE: 98.1 F | RESPIRATION RATE: 16 BRPM | DIASTOLIC BLOOD PRESSURE: 57 MMHG | HEART RATE: 78 BPM | SYSTOLIC BLOOD PRESSURE: 95 MMHG

## 2018-02-25 LAB
ANION GAP SERPL CALCULATED.3IONS-SCNC: 5 MMOL/L (ref 4–13)
BUN SERPL-MCNC: 3 MG/DL (ref 5–25)
CALCIUM SERPL-MCNC: 7.9 MG/DL (ref 8.3–10.1)
CHLORIDE SERPL-SCNC: 112 MMOL/L (ref 100–108)
CO2 SERPL-SCNC: 27 MMOL/L (ref 21–32)
CREAT SERPL-MCNC: 0.51 MG/DL (ref 0.6–1.3)
ERYTHROCYTE [DISTWIDTH] IN BLOOD BY AUTOMATED COUNT: 12.7 % (ref 11.6–15.1)
GFR SERPL CREATININE-BSD FRML MDRD: 122 ML/MIN/1.73SQ M
GLUCOSE SERPL-MCNC: 116 MG/DL (ref 65–140)
HCT VFR BLD AUTO: 27.1 % (ref 34.8–46.1)
HGB BLD-MCNC: 9.4 G/DL (ref 11.5–15.4)
MCH RBC QN AUTO: 30.2 PG (ref 26.8–34.3)
MCHC RBC AUTO-ENTMCNC: 34.7 G/DL (ref 31.4–37.4)
MCV RBC AUTO: 87 FL (ref 82–98)
PLATELET # BLD AUTO: 153 THOUSANDS/UL (ref 149–390)
PMV BLD AUTO: 10.7 FL (ref 8.9–12.7)
POTASSIUM SERPL-SCNC: 3.9 MMOL/L (ref 3.5–5.3)
RBC # BLD AUTO: 3.11 MILLION/UL (ref 3.81–5.12)
SODIUM SERPL-SCNC: 144 MMOL/L (ref 136–145)
WBC # BLD AUTO: 5.07 THOUSAND/UL (ref 4.31–10.16)

## 2018-02-25 PROCEDURE — 80048 BASIC METABOLIC PNL TOTAL CA: CPT | Performed by: PHYSICIAN ASSISTANT

## 2018-02-25 PROCEDURE — 85027 COMPLETE CBC AUTOMATED: CPT | Performed by: PHYSICIAN ASSISTANT

## 2018-02-25 RX ORDER — ONDANSETRON 4 MG/1
4 TABLET, FILM COATED ORAL EVERY 8 HOURS PRN
Qty: 20 TABLET | Refills: 0 | Status: SHIPPED | OUTPATIENT
Start: 2018-02-25 | End: 2018-03-12 | Stop reason: ALTCHOICE

## 2018-02-25 RX ORDER — NITROFURANTOIN 25; 75 MG/1; MG/1
100 CAPSULE ORAL 2 TIMES DAILY
Qty: 20 CAPSULE | Refills: 0 | Status: SHIPPED | OUTPATIENT
Start: 2018-02-25 | End: 2018-03-12 | Stop reason: ALTCHOICE

## 2018-02-25 RX ORDER — OXYCODONE HYDROCHLORIDE AND ACETAMINOPHEN 5; 325 MG/1; MG/1
1 TABLET ORAL EVERY 4 HOURS PRN
Qty: 30 TABLET | Refills: 0 | Status: SHIPPED | OUTPATIENT
Start: 2018-02-25 | End: 2018-03-07

## 2018-02-25 RX ADMIN — ONDANSETRON 4 MG: 2 INJECTION INTRAMUSCULAR; INTRAVENOUS at 10:09

## 2018-02-25 RX ADMIN — DEXTROSE AND SODIUM CHLORIDE 100 ML/HR: 5; 450 INJECTION, SOLUTION INTRAVENOUS at 01:05

## 2018-02-25 RX ADMIN — HEPARIN SODIUM 5000 UNITS: 5000 INJECTION, SOLUTION INTRAVENOUS; SUBCUTANEOUS at 05:26

## 2018-02-25 RX ADMIN — OXYCODONE HYDROCHLORIDE AND ACETAMINOPHEN 1 TABLET: 5; 325 TABLET ORAL at 10:09

## 2018-02-25 RX ADMIN — DOCUSATE SODIUM 100 MG: 100 CAPSULE, LIQUID FILLED ORAL at 08:32

## 2018-02-25 NOTE — DISCHARGE SUMMARY
Discharge Summary   Uday San 1980 42 y o  female   MRN: [id number]    Unit/Bed#: [facility] 821-01 Encounter: [id number]    Admission Date: 2/22/2018   Admitting Diagnosis: Preop examination [Z01 818]      Procedures Performed: REIMPLANTATION URETEROCYSTOSTOMY (Right)      Surgical Pathology: none or pending       Order Name Source Comment Collection Info Order Time   CBC AND PLATELET Arm, Left  Collected By: Scooby Mock RN 2/22/2018 12:16 PM   TYPE AND SCREEN Arm, Left  Collected By: Shanda Vincent 2/22/2018  6:35 AM    Has the patient been transfused in the last 3 months? No       Has the patient been pregnant within the last 3 months? No       Medical or Pre-op  PreOp       Where is Surgery Scheduled? [facility]      TISSUE EXAM Ureter, Right  Collected By: Boby Toussaint MD 2/22/2018  9:43 AM       Hospital Course:  Uday San is a 42 y o   female with obstructing stricture of the right ureter secondary to prior pelvic surgery from GYN procedure  She has had previous attempts to dilate the ureter with both hydrostatic dilation and laser incision of the strictured area  She had had indwelling stents  However when removed the hydronephrosis returned  She is now for definitive management with excision of the stricture and/or ureteral elodia cystotomy  No fever or chills   No hematuria  She had surgery on  February 22 with Dr Makenna Du  There was an epidural PCA placed  She did well during the hospital stay without complications  The epidural was removed on POD # 2 and the drain was removed on POD # 3 and she was able to de discharged home  The urine was a little bloody on the day of discharge  Follow up labs were ordered and her H&H was actually improved from the previous  She was discharged with the rice catheter and she has an indwelling NU stent  The rice will be removed 10 days post op and the NU stent in about 28 days        Patient Active Problem List   Diagnosis    Vaginal injury  Ureteral stricture, right    CVA tenderness    Hydronephrosis    Preop examination       Discharge Diagnosis:      1  POD # 3 REIMPLANTATION URETEROCYSTOSTOMY (Right)   2  Right ureteral stricture with hydronephrosis  3  Expected ABLA- improved      Condition at Discharge: good     Discharge instructions/Information to patient and family:   See after visit summary for information provided to patient and family  Provisions for Follow-Up Care:  See after visit summary for information related to follow-up care and any pertinent home health orders  Disposition: Home    Discharge Statement   I spent 30 minutes discharging the patient  This time was spent on the day of discharge  I had direct contact with the patient on the day of discharge  Additional documentation is required if more than 30 minutes were spent on discharge  Discharge Medications:  See after visit summary for reconciled discharge medications provided to patient and family

## 2018-02-25 NOTE — PROGRESS NOTES
UROLOGY PROGRESS NOTE   Patient Identifiers: Dany Stafford (MRN [id number])  Date of Service: 2/25/2018    Subjective:    Feels ok today   Occasional pain, tolerates PO    Patient has  see HPI      Objective:     VITALS:    Vitals:    02/25/18 0816   BP: 95/57   Pulse: 78   Resp: 16   Temp: 98 1 °F (36 7 °C)   SpO2: 98%           LABS:  Lab Results   Component Value Date    HGB 8 6 (L) 02/24/2018    HCT 25 7 (L) 02/24/2018    WBC 6 20 02/24/2018     (L) 02/24/2018   ]    Lab Results   Component Value Date     02/24/2018    K 4 2 02/24/2018     (H) 02/24/2018    CO2 24 02/24/2018    BUN 3 (L) 02/24/2018    CREATININE 0 55 (L) 02/24/2018    CALCIUM 7 6 (L) 02/24/2018    GLUCOSE 90 02/24/2018   ]    INPATIENT MEDS:    Current Facility-Administered Medications:     dextrose 5 % and sodium chloride 0 45 % infusion, 100 mL/hr, Intravenous, Continuous, Elizabeth Cantu PA-C, Last Rate: 100 mL/hr at 02/25/18 0105, 100 mL/hr at 02/25/18 0105    diphenhydrAMINE (BENADRYL) injection 25 mg, 25 mg, Intravenous, Q6H PRN, Alverto Soliz MD, 25 mg at 02/23/18 2200    docusate sodium (COLACE) capsule 100 mg, 100 mg, Oral, BID, Jem Nguyen MD, 100 mg at 02/25/18 3492    heparin (porcine) subcutaneous injection 5,000 Units, 5,000 Units, Subcutaneous, Q8H [facility], 5,000 Units at 02/25/18 0526 **AND** Platelet count, , , Once, Elizabeth Cantu PA-C    ondansetron (ZOFRAN) injection 4 mg, 4 mg, Intravenous, Q6H PRN, Jem Nguyen MD    ondansetron [facility]) injection 4 mg, 4 mg, Intravenous, Q6H PRN, Elizabeth Cantu PA-C    ondansetron (ZOFRAN) injection 4 mg, 4 mg, Intravenous, Q4H PRN, Alverto Soliz MD    oxyCODONE-acetaminophen (PERCOCET) 5-325 mg per tablet 1 tablet, 1 tablet, Oral, Q4H PRN, Jude Phelan PA-C, 1 tablet at 02/24/18 2140      Physical Exam:   BP 95/57 (BP Location: Right arm)   Pulse 78   Temp 98 1 °F (36 7 °C) (Oral)   Resp 16   Ht 5' 5" (1 651 m)   Wt 59 kg (130 lb)   SpO2 98%   BMI 21 63 kg/m²   GEN: no acute distress    RESP: breathing comfortably with no accessory muscle use    ABD: soft, non-tender, non-distended   INCISION: clean, dry, intact drain removed- dressing applied  EXT: no significant peripheral edema     MAC: draining cherry red clear urine  no clots and       RADIOLOGY:   none    Assessment:        1  POD # 3 REIMPLANTATION URETEROCYSTOSTOMY (Right)   2  Right ureteral stricture with hydronephrosis  3   Expected ABLA      Plan:   - check AM labs  - likely discharge home later today  -  -  -

## 2018-02-25 NOTE — PLAN OF CARE
DISCHARGE PLANNING     Discharge to home or other facility with appropriate resources Adequate for Discharge        DISCHARGE PLANNING - CARE MANAGEMENT     Discharge to post-acute care or home with appropriate resources Adequate for Discharge        INFECTION - ADULT     Absence or prevention of progression during hospitalization Adequate for Discharge     Absence of fever/infection during neutropenic period Adequate for Discharge        Knowledge Deficit     Patient/family/caregiver demonstrates understanding of disease process, treatment plan, medications, and discharge instructions Adequate for Discharge        PAIN - ADULT     Verbalizes/displays adequate comfort level or baseline comfort level Adequate for Discharge        Potential for Falls     Patient will remain free of falls Adequate for Discharge        SAFETY ADULT     Patient will remain free of falls Adequate for Discharge     Maintain or return to baseline ADL function Adequate for Discharge     Maintain or return mobility status to optimal level Adequate for Discharge

## 2018-02-27 DIAGNOSIS — S37.10XS INJURY OF RIGHT URETER, SEQUELA: Primary | ICD-10-CM

## 2018-02-28 ENCOUNTER — TRANSITIONAL CARE MANAGEMENT (OUTPATIENT)
Dept: FAMILY MEDICINE CLINIC | Facility: CLINIC | Age: 38
End: 2018-02-28

## 2018-03-05 ENCOUNTER — TRANSCRIBE ORDERS (OUTPATIENT)
Dept: RADIOLOGY | Facility: HOSPITAL | Age: 38
End: 2018-03-05

## 2018-03-05 ENCOUNTER — HOSPITAL ENCOUNTER (OUTPATIENT)
Dept: RADIOLOGY | Facility: HOSPITAL | Age: 38
Discharge: HOME | End: 2018-03-05
Payer: COMMERCIAL

## 2018-03-05 DIAGNOSIS — S37.10XS INJURY OF RIGHT URETER, SEQUELA: ICD-10-CM

## 2018-03-05 PROCEDURE — 74430 CONTRAST X-RAY BLADDER: CPT

## 2018-03-05 RX ADMIN — IOTHALAMATE MEGLUMINE 250 ML: 172 INJECTION URETERAL at 09:50

## 2018-03-06 ENCOUNTER — OFFICE VISIT (OUTPATIENT)
Dept: UROLOGY | Facility: CLINIC | Age: 38
End: 2018-03-06

## 2018-03-06 VITALS — SYSTOLIC BLOOD PRESSURE: 101 MMHG | DIASTOLIC BLOOD PRESSURE: 55 MMHG | HEART RATE: 83 BPM

## 2018-03-06 DIAGNOSIS — N13.30 HYDRONEPHROSIS, UNSPECIFIED HYDRONEPHROSIS TYPE: Primary | ICD-10-CM

## 2018-03-06 DIAGNOSIS — N13.5 URETERAL STRICTURE, RIGHT: ICD-10-CM

## 2018-03-06 PROCEDURE — 99024 POSTOP FOLLOW-UP VISIT: CPT | Performed by: UROLOGY

## 2018-03-06 NOTE — PROGRESS NOTES
Progress Note - Urology  Alease Goodell 42 y o  female MRN: [id number]  Encounter: [id number]      Chief Complaint:   Chief Complaint   Patient presents with    Post-op       HPI:   recent ureteroneocystostomy for obstructing ureteral stricture on the right side  This was accomplished on February 22nd  Cystogram today shows the bladder to be without evidence of leakage  Catheter to be removed today  Stent to be removed next week  She is having no difficulties to report      MEDS:    Current Outpatient Prescriptions:     nitrofurantoin (MACROBID) 100 mg capsule, Take 1 capsule (100 mg total) by mouth 2 (two) times a day, Disp: 20 capsule, Rfl: 0    ondansetron (ZOFRAN) 4 mg tablet, Take 1 tablet (4 mg total) by mouth every 8 (eight) hours as needed for nausea or vomiting, Disp: 20 tablet, Rfl: 0    oxyCODONE-acetaminophen (PERCOCET) 5-325 mg per tablet, Take 1 tablet by mouth every 4 (four) hours as needed for moderate pain for up to 10 days Max Daily Amount: 6 tablets, Disp: 30 tablet, Rfl: 0      PMH:  Past Medical History:   Diagnosis Date    Hydronephrosis     Migraine     PONV (postoperative nausea and vomiting)     Ureteral stricture          ROS:  Review of Systems      Vitals:  Blood pressure 101/55, pulse 83  Physical Exam:    the Tejada catheter was removed at this time  The abdomen is soft  The wound is satisfactory dry and healing nicely no evidence of infection      Lab, Imaging and other studies:  No results found for this or any previous visit (from the past 48 hour(s))        IMPRESSION:   postop right ureteroneocystostomy    PLAN:   remove stent in 1 week

## 2018-03-12 ENCOUNTER — CLINICAL SUPPORT (OUTPATIENT)
Dept: UROLOGY | Facility: CLINIC | Age: 38
End: 2018-03-12
Payer: COMMERCIAL

## 2018-03-12 DIAGNOSIS — N39.0 URINARY TRACT INFECTION WITHOUT HEMATURIA, SITE UNSPECIFIED: Primary | ICD-10-CM

## 2018-03-12 DIAGNOSIS — N39.0 URINARY TRACT INFECTION WITH HEMATURIA, SITE UNSPECIFIED: Primary | ICD-10-CM

## 2018-03-12 DIAGNOSIS — R31.9 URINARY TRACT INFECTION WITH HEMATURIA, SITE UNSPECIFIED: Primary | ICD-10-CM

## 2018-03-12 LAB
SL AMB  POCT GLUCOSE, UA: ABNORMAL
SL AMB LEUKOCYTE ESTERASE,UA: ABNORMAL
SL AMB POCT BLOOD,UA: ABNORMAL
SL AMB POCT CLARITY,UA: ABNORMAL
SL AMB POCT COLOR,UA: YELLOW
SL AMB POCT KETONES,UA: ABNORMAL
SL AMB POCT NITRITE,UA: ABNORMAL
SL AMB POCT PH,UA: 6.5
SL AMB POCT URINE PROTEIN: ABNORMAL

## 2018-03-12 PROCEDURE — 99024 POSTOP FOLLOW-UP VISIT: CPT | Performed by: UROLOGY

## 2018-03-12 PROCEDURE — 81002 URINALYSIS NONAUTO W/O SCOPE: CPT | Performed by: UROLOGY

## 2018-03-12 PROCEDURE — 87077 CULTURE AEROBIC IDENTIFY: CPT | Performed by: PHYSICIAN ASSISTANT

## 2018-03-12 PROCEDURE — P9612 CATHETERIZE FOR URINE SPEC: HCPCS | Performed by: UROLOGY

## 2018-03-12 PROCEDURE — 87186 SC STD MICRODIL/AGAR DIL: CPT | Performed by: PHYSICIAN ASSISTANT

## 2018-03-12 PROCEDURE — 87086 URINE CULTURE/COLONY COUNT: CPT | Performed by: PHYSICIAN ASSISTANT

## 2018-03-12 RX ORDER — SULFAMETHOXAZOLE AND TRIMETHOPRIM 800; 160 MG/1; MG/1
1 TABLET ORAL EVERY 12 HOURS SCHEDULED
Qty: 14 TABLET | Refills: 0 | Status: SHIPPED | OUTPATIENT
Start: 2018-03-12 | End: 2018-03-19

## 2018-03-12 NOTE — PROGRESS NOTES
Patient came in today c/o having frequency, a fever last night of 100 9 and having burning when voiding  A catheter specimen was obtained and sent out for culture  I spoke with Mihai Rodríguez and he will send in an antibiotic to her pharmacy while we await the culture results

## 2018-03-14 ENCOUNTER — OFFICE VISIT (OUTPATIENT)
Dept: UROLOGY | Facility: CLINIC | Age: 38
End: 2018-03-14
Payer: COMMERCIAL

## 2018-03-14 VITALS
BODY MASS INDEX: 21.16 KG/M2 | HEIGHT: 65 IN | SYSTOLIC BLOOD PRESSURE: 106 MMHG | WEIGHT: 127 LBS | DIASTOLIC BLOOD PRESSURE: 56 MMHG

## 2018-03-14 DIAGNOSIS — N13.30 HYDRONEPHROSIS, UNSPECIFIED HYDRONEPHROSIS TYPE: Primary | ICD-10-CM

## 2018-03-14 DIAGNOSIS — R31.29 MICROHEMATURIA: ICD-10-CM

## 2018-03-14 LAB
BACTERIA UR CULT: ABNORMAL
SL AMB  POCT GLUCOSE, UA: ABNORMAL
SL AMB LEUKOCYTE ESTERASE,UA: ABNORMAL
SL AMB POCT BILIRUBIN,UA: ABNORMAL
SL AMB POCT BLOOD,UA: ABNORMAL
SL AMB POCT CLARITY,UA: ABNORMAL
SL AMB POCT COLOR,UA: YELLOW
SL AMB POCT KETONES,UA: ABNORMAL
SL AMB POCT NITRITE,UA: ABNORMAL
SL AMB POCT PH,UA: 5
SL AMB POCT SPECIFIC GRAVITY,UA: ABNORMAL
SL AMB POCT URINE PROTEIN: ABNORMAL
SL AMB POCT UROBILINOGEN: ABNORMAL

## 2018-03-14 PROCEDURE — 52310 CYSTOSCOPY AND TREATMENT: CPT | Performed by: UROLOGY

## 2018-03-14 PROCEDURE — 81002 URINALYSIS NONAUTO W/O SCOPE: CPT | Performed by: UROLOGY

## 2018-03-14 NOTE — PROGRESS NOTES
Cystoscopy    Patient: Shaheen Hi                       :    1980                                    Date:    3/14/2018    Surgeon:  Deneen David MD    Preoperative Diagnosis:  Retained right nephroureteral stent    Postoperative Diagnosis:  same    Anesthesia: 2% lidocaine gel    Operative Procedure: Cystoscopy  Complications: None  Procedure: The patient was ID on the table  The penis was prepped and draped in sterile fashion  2% Local Lidocaine was placed  Five minutes passed before inspection of the bladder  A 14 fr flexible cystoscope was passed per meatus  The urethra was unremarkable  The bladder is entered  Culture specimen obtained  The stent protruding from the right ureteroneocystostomy is grasped and removed without difficulty  There is prominent edema about this area  Scope was removed  Patient tolerated the procedure well and was given antibiotics prophylactically  right ureteroneocystostomy   Will check renal ultrasound in 2 weeks  Office visit in 4 weeks  She is on Bactrim DS at this time    Culture report will be pending

## 2018-03-22 ENCOUNTER — HOSPITAL ENCOUNTER (EMERGENCY)
Facility: HOSPITAL | Age: 38
Discharge: HOME | End: 2018-03-22
Payer: COMMERCIAL

## 2018-03-22 ENCOUNTER — APPOINTMENT (OUTPATIENT)
Dept: LAB | Facility: HOSPITAL | Age: 38
End: 2018-03-22
Payer: COMMERCIAL

## 2018-03-22 ENCOUNTER — HOSPITAL ENCOUNTER (EMERGENCY)
Facility: HOSPITAL | Age: 38
Discharge: HOME | End: 2018-03-22
Attending: EMERGENCY MEDICINE | Admitting: EMERGENCY MEDICINE
Payer: COMMERCIAL

## 2018-03-22 ENCOUNTER — HOSPITAL ENCOUNTER (OUTPATIENT)
Dept: ULTRASOUND IMAGING | Facility: HOSPITAL | Age: 38
Discharge: HOME | End: 2018-03-22
Payer: COMMERCIAL

## 2018-03-22 VITALS
TEMPERATURE: 98.4 F | OXYGEN SATURATION: 97 % | RESPIRATION RATE: 18 BRPM | SYSTOLIC BLOOD PRESSURE: 103 MMHG | DIASTOLIC BLOOD PRESSURE: 55 MMHG | HEART RATE: 108 BPM

## 2018-03-22 VITALS
OXYGEN SATURATION: 97 % | SYSTOLIC BLOOD PRESSURE: 89 MMHG | HEART RATE: 101 BPM | RESPIRATION RATE: 18 BRPM | TEMPERATURE: 98.2 F | DIASTOLIC BLOOD PRESSURE: 59 MMHG

## 2018-03-22 DIAGNOSIS — N39.0 ACUTE URINARY TRACT INFECTION: Primary | ICD-10-CM

## 2018-03-22 DIAGNOSIS — N13.30 HYDRONEPHROSIS, UNSPECIFIED HYDRONEPHROSIS TYPE: Primary | ICD-10-CM

## 2018-03-22 DIAGNOSIS — N13.30 HYDRONEPHROSIS, UNSPECIFIED HYDRONEPHROSIS TYPE: ICD-10-CM

## 2018-03-22 DIAGNOSIS — R50.9 FEVER: ICD-10-CM

## 2018-03-22 DIAGNOSIS — N39.0 URINARY TRACT INFECTION WITHOUT HEMATURIA, SITE UNSPECIFIED: ICD-10-CM

## 2018-03-22 LAB
ALBUMIN SERPL BCP-MCNC: 3.5 G/DL (ref 3.5–5)
ALP SERPL-CCNC: 61 U/L (ref 46–116)
ALT SERPL W P-5'-P-CCNC: 13 U/L (ref 12–78)
ANION GAP SERPL CALCULATED.3IONS-SCNC: 12 MMOL/L (ref 4–13)
APTT PPP: 31 SECONDS (ref 23–35)
AST SERPL W P-5'-P-CCNC: 13 U/L (ref 5–45)
BACTERIA UR QL AUTO: ABNORMAL /HPF
BASOPHILS # BLD AUTO: 0.01 THOUSANDS/ΜL (ref 0–0.1)
BASOPHILS NFR BLD AUTO: 0 % (ref 0–1)
BILIRUB SERPL-MCNC: 0.42 MG/DL (ref 0.2–1)
BILIRUB UR QL STRIP: NEGATIVE
BUN SERPL-MCNC: 6 MG/DL (ref 5–25)
CALCIUM SERPL-MCNC: 8.7 MG/DL (ref 8.3–10.1)
CHLORIDE SERPL-SCNC: 103 MMOL/L (ref 100–108)
CLARITY UR: ABNORMAL
CO2 SERPL-SCNC: 22 MMOL/L (ref 21–32)
COLOR UR: YELLOW
CREAT SERPL-MCNC: 0.75 MG/DL (ref 0.6–1.3)
EOSINOPHIL # BLD AUTO: 0 THOUSAND/ΜL (ref 0–0.61)
EOSINOPHIL NFR BLD AUTO: 0 % (ref 0–6)
ERYTHROCYTE [DISTWIDTH] IN BLOOD BY AUTOMATED COUNT: 12.9 % (ref 11.6–15.1)
EXT PREG TEST URINE: NORMAL
GFR SERPL CREATININE-BSD FRML MDRD: 101 ML/MIN/1.73SQ M
GLUCOSE SERPL-MCNC: 101 MG/DL (ref 65–140)
GLUCOSE UR STRIP-MCNC: NEGATIVE MG/DL
HCT VFR BLD AUTO: 30.8 % (ref 34.8–46.1)
HGB BLD-MCNC: 10 G/DL (ref 11.5–15.4)
HGB UR QL STRIP.AUTO: ABNORMAL
INR PPP: 1.12 (ref 0.86–1.16)
KETONES UR STRIP-MCNC: NEGATIVE MG/DL
LACTATE SERPL-SCNC: 0.9 MMOL/L (ref 0.5–2)
LEUKOCYTE ESTERASE UR QL STRIP: ABNORMAL
LYMPHOCYTES # BLD AUTO: 0.58 THOUSANDS/ΜL (ref 0.6–4.47)
LYMPHOCYTES NFR BLD AUTO: 8 % (ref 14–44)
MCH RBC QN AUTO: 26.9 PG (ref 26.8–34.3)
MCHC RBC AUTO-ENTMCNC: 32.5 G/DL (ref 31.4–37.4)
MCV RBC AUTO: 83 FL (ref 82–98)
MONOCYTES # BLD AUTO: 0.55 THOUSAND/ΜL (ref 0.17–1.22)
MONOCYTES NFR BLD AUTO: 7 % (ref 4–12)
NEUTROPHILS # BLD AUTO: 6.39 THOUSANDS/ΜL (ref 1.85–7.62)
NEUTS SEG NFR BLD AUTO: 85 % (ref 43–75)
NITRITE UR QL STRIP: NEGATIVE
NON-SQ EPI CELLS URNS QL MICRO: ABNORMAL /HPF
NRBC BLD AUTO-RTO: 0 /100 WBCS
PH UR STRIP.AUTO: 7 [PH] (ref 4.5–8)
PLATELET # BLD AUTO: 188 THOUSANDS/UL (ref 149–390)
PMV BLD AUTO: 10.6 FL (ref 8.9–12.7)
POTASSIUM SERPL-SCNC: 3.6 MMOL/L (ref 3.5–5.3)
PROT SERPL-MCNC: 7 G/DL (ref 6.4–8.2)
PROT UR STRIP-MCNC: ABNORMAL MG/DL
PROTHROMBIN TIME: 14.4 SECONDS (ref 12.1–14.4)
RBC # BLD AUTO: 3.72 MILLION/UL (ref 3.81–5.12)
RBC #/AREA URNS AUTO: ABNORMAL /HPF
SODIUM SERPL-SCNC: 137 MMOL/L (ref 136–145)
SP GR UR STRIP.AUTO: 1.01 (ref 1–1.03)
UROBILINOGEN UR QL STRIP.AUTO: 0.2 E.U./DL
WBC # BLD AUTO: 7.53 THOUSAND/UL (ref 4.31–10.16)
WBC #/AREA URNS AUTO: ABNORMAL /HPF

## 2018-03-22 PROCEDURE — 83605 ASSAY OF LACTIC ACID: CPT | Performed by: EMERGENCY MEDICINE

## 2018-03-22 PROCEDURE — 99283 EMERGENCY DEPT VISIT LOW MDM: CPT

## 2018-03-22 PROCEDURE — 81002 URINALYSIS NONAUTO W/O SCOPE: CPT | Performed by: EMERGENCY MEDICINE

## 2018-03-22 PROCEDURE — 85025 COMPLETE CBC W/AUTO DIFF WBC: CPT | Performed by: EMERGENCY MEDICINE

## 2018-03-22 PROCEDURE — 87186 SC STD MICRODIL/AGAR DIL: CPT

## 2018-03-22 PROCEDURE — 96365 THER/PROPH/DIAG IV INF INIT: CPT

## 2018-03-22 PROCEDURE — 85730 THROMBOPLASTIN TIME PARTIAL: CPT | Performed by: EMERGENCY MEDICINE

## 2018-03-22 PROCEDURE — 85610 PROTHROMBIN TIME: CPT | Performed by: EMERGENCY MEDICINE

## 2018-03-22 PROCEDURE — 96375 TX/PRO/DX INJ NEW DRUG ADDON: CPT

## 2018-03-22 PROCEDURE — 76770 US EXAM ABDO BACK WALL COMP: CPT

## 2018-03-22 PROCEDURE — 87086 URINE CULTURE/COLONY COUNT: CPT

## 2018-03-22 PROCEDURE — 81025 URINE PREGNANCY TEST: CPT | Performed by: EMERGENCY MEDICINE

## 2018-03-22 PROCEDURE — 81001 URINALYSIS AUTO W/SCOPE: CPT

## 2018-03-22 PROCEDURE — 80053 COMPREHEN METABOLIC PANEL: CPT | Performed by: EMERGENCY MEDICINE

## 2018-03-22 PROCEDURE — 36415 COLL VENOUS BLD VENIPUNCTURE: CPT | Performed by: EMERGENCY MEDICINE

## 2018-03-22 PROCEDURE — 87077 CULTURE AEROBIC IDENTIFY: CPT

## 2018-03-22 RX ORDER — ACETAMINOPHEN 325 MG/1
650 TABLET ORAL ONCE
Status: COMPLETED | OUTPATIENT
Start: 2018-03-22 | End: 2018-03-22

## 2018-03-22 RX ORDER — IBUPROFEN 400 MG/1
400 TABLET ORAL EVERY 6 HOURS PRN
Qty: 30 TABLET | Refills: 0 | Status: SHIPPED | OUTPATIENT
Start: 2018-03-22 | End: 2022-02-23

## 2018-03-22 RX ORDER — ONDANSETRON 2 MG/ML
4 INJECTION INTRAMUSCULAR; INTRAVENOUS ONCE
Status: COMPLETED | OUTPATIENT
Start: 2018-03-22 | End: 2018-03-22

## 2018-03-22 RX ORDER — LEVOFLOXACIN 750 MG/1
750 TABLET ORAL EVERY 24 HOURS
Qty: 4 TABLET | Refills: 0 | Status: SHIPPED | OUTPATIENT
Start: 2018-03-22 | End: 2018-03-27 | Stop reason: ALTCHOICE

## 2018-03-22 RX ORDER — KETOROLAC TROMETHAMINE 30 MG/ML
15 INJECTION, SOLUTION INTRAMUSCULAR; INTRAVENOUS ONCE
Status: COMPLETED | OUTPATIENT
Start: 2018-03-22 | End: 2018-03-22

## 2018-03-22 RX ORDER — ACETAMINOPHEN 325 MG/1
975 TABLET ORAL ONCE
Status: COMPLETED | OUTPATIENT
Start: 2018-03-22 | End: 2018-03-22

## 2018-03-22 RX ORDER — ACETAMINOPHEN 500 MG
500 TABLET ORAL EVERY 6 HOURS PRN
Qty: 30 TABLET | Refills: 0 | Status: SHIPPED | OUTPATIENT
Start: 2018-03-22 | End: 2022-03-24

## 2018-03-22 RX ORDER — LEVOFLOXACIN 5 MG/ML
750 INJECTION, SOLUTION INTRAVENOUS ONCE
Status: COMPLETED | OUTPATIENT
Start: 2018-03-22 | End: 2018-03-22

## 2018-03-22 RX ORDER — LEVOFLOXACIN 500 MG/1
500 TABLET, FILM COATED ORAL EVERY 24 HOURS
Qty: 7 TABLET | Refills: 0 | Status: SHIPPED | OUTPATIENT
Start: 2018-03-22 | End: 2018-03-22 | Stop reason: ALTCHOICE

## 2018-03-22 RX ADMIN — KETOROLAC TROMETHAMINE 15 MG: 30 INJECTION, SOLUTION INTRAMUSCULAR at 14:00

## 2018-03-22 RX ADMIN — ACETAMINOPHEN 650 MG: 325 TABLET, FILM COATED ORAL at 12:06

## 2018-03-22 RX ADMIN — ONDANSETRON 4 MG: 2 INJECTION INTRAMUSCULAR; INTRAVENOUS at 13:59

## 2018-03-22 RX ADMIN — LEVOFLOXACIN 750 MG: 5 INJECTION, SOLUTION INTRAVENOUS at 14:17

## 2018-03-22 RX ADMIN — SODIUM CHLORIDE 1000 ML: 0.9 INJECTION, SOLUTION INTRAVENOUS at 13:58

## 2018-03-22 RX ADMIN — ACETAMINOPHEN 975 MG: 325 TABLET, FILM COATED ORAL at 20:34

## 2018-03-22 NOTE — ED PROVIDER NOTES
History  Chief Complaint   Patient presents with    Fever - 9 weeks to 74 years     pt had procedure done at [facility] 2/22 and has been doing well until yesterday started with fever  pt called doctor and advised to come to ED for evaluation  temp of 101 at home yesterday  took advil at 0100      42 y o  F w/ h/o obstructing stricture of the right ureter secondary to prior pelvic surgery from GYN procedure, s/p right reimplantation ureterocystostomy on 3/22/18 by Dr Thor Aase (urology), right nephroureteral stent removed on 3/14/2018  Pt started with a fever yesterday  Pt was told by her doctor to get a renal US (which she got today and was read as slight fullness of the right renal pelvis without kristie hydronephrosis  Otherwise normal renal sonogram  ) and a urine  Pt notes body aches, N/V  Denies headache, cough, abd pain, diarrhea, urinary complaints  History provided by:  Patient   used: No    Fever - 9 weeks to 74 years   Max temp prior to arrival:  103F  Duration:  1 day  Chronicity:  New  Relieved by:  None tried  Worsened by:  Nothing  Ineffective treatments:  None tried  Associated symptoms: myalgias, nausea, rhinorrhea and vomiting    Associated symptoms: no chest pain, no chills, no congestion, no cough, no diarrhea, no dysuria and no headaches        None       Past Medical History:   Diagnosis Date    Hydronephrosis     Migraine     PONV (postoperative nausea and vomiting)     Ureteral stricture        Past Surgical History:   Procedure Laterality Date    APPENDECTOMY      CYSTOSCOPY W/ LASER LITHOTRIPSY Right 11/2/2017    Procedure: CYSTOSCOPY;  RIGHT URETEROSCOPY WITH  HOLMIUM LASER INCISION OF URETERAL STRICTURE; (WITH HYDROSTATIC RIGHT URETERAL DILATION), BILATERAL RETROGRADE PYELOGRAM AND INSERTION OF RIGHT URETERAL STENT X 2 (4 7 X 26);   Surgeon: Shanna Villar MD;  [facility];  Service: Urology    DILATION AND CURETTAGE OF UTERUS N/A 12/30/2016 Procedure: DILATATION AND CURETTAGE;  Surgeon: Too Middleton MD;  [facility];  Service:    [address] [place], [place] [address]way N/A 5/22/2017    Procedure: EXAM UNDER ANESTHESIA;  Surgeon: Too Middleton MD;  [facility];  Service:     HYSTEROSCOPY N/A 12/30/2016    Procedure: HYSTEROSCOPY;  Surgeon: Too Middleton MD;  [facility];  Service:    [facility] [place] CYSTOURETHROSCOPY,URETER CATHETER Bilateral 3/27/2017    Procedure: CYSTOSCOPY WITH RETROGRADE PYELOGRAM; RIGHT STENT INSERTION;  Surgeon: Jem Nguyen MD;  [facility];  Service: Urology    [place] CYSTOURETHROSCOPY,URETER CATHETER N/A 1/11/2018    Procedure: CYSTOSCOPY , BILATERAL RETROGRADE PYELOGRAM WITH RIGHT STENT EXTRACTION;  Surgeon: Jem Nguyen MD;  [facility];  Service: Urology    [place] CYSTOURETHROSCOPY,URETER CATHETER Right 1/15/2018    Procedure: CYSTOSCOPY, RIGHT RETROGRADE PYELOGRAM WITH INSERTION OF RIGHT STENT URETERAL;  Surgeon: Jem Nguyen MD;  [facility];  Service: Urology    [place] LAPAROSCOPY W TOT HYSTERECT UTERUS 250 GRAM OR LESS N/A 3/24/2017    Procedure: TOTAL LAPAROSCOPIC HYSTERECTOMY   bilateral salpingectomy, cysto; Surgeon: Too Middleton MD;  [facility];  Service: Gynecology    REIMPLANT URETER IN BLADDER Right 2/22/2018    Procedure: URETERAL NEOCYSTOSTOMY, EXCISION OF URETERAL STRICTURE ;  Surgeon: Jem Nguyen MD;  [facility];  Service: Urology    URETERAL STENT PLACEMENT Right 2/22/2018    Procedure: INSERTION STENT URETERAL;  Surgeon: Jem Nguyen MD;  [facility];  Service: Urology       Family History   Problem Relation Age of Onset    Heart attack Mother     Hypertension Mother     Colon cancer Brother      I have reviewed and agree with the history as documented      Social History   Substance Use Topics    Smoking status: Former Smoker     Packs/day: 0 20     Types: Cigarettes     Quit date: 2007    Smokeless tobacco: Never Used    Alcohol use 1 2 oz/week     2 Glasses of wine per week        Review of Systems   Constitutional: Positive for fever  Negative for appetite change and chills  HENT: Positive for rhinorrhea  Negative for congestion  Respiratory: Negative for cough  Cardiovascular: Negative for chest pain  Gastrointestinal: Positive for nausea and vomiting  Negative for abdominal distention, abdominal pain, blood in stool, constipation and diarrhea  Genitourinary: Negative for dysuria, flank pain, frequency, hematuria, urgency, vaginal bleeding and vaginal discharge  Musculoskeletal: Positive for myalgias  Negative for back pain  Neurological: Negative for headaches  All other systems reviewed and are negative  Physical Exam  ED Triage Vitals   Temperature Pulse Respirations Blood Pressure SpO2   03/22/18 1202 03/22/18 1202 03/22/18 1202 03/22/18 1204 03/22/18 1202   (!) 103 1 °F (39 5 °C) (!) 122 16 117/73 99 %      Temp Source Heart Rate Source Patient Position - Orthostatic VS BP Location FiO2 (%)   03/22/18 1202 03/22/18 1539 -- -- --   Oral Monitor         Pain Score       03/22/18 1202       No Pain           Orthostatic Vital Signs  Vitals:    03/22/18 1204 03/22/18 1344 03/22/18 1539 03/22/18 1557   BP: 117/73 103/59 (!) 81/46 (!) 89/59   Pulse:  96 (!) 110 101       Physical Exam   Constitutional: She is oriented to person, place, and time  She appears well-developed and well-nourished  Non-toxic appearance  She does not have a sickly appearance  She does not appear ill  HENT:   Head: Normocephalic and atraumatic  Right Ear: Tympanic membrane normal  No drainage  Tympanic membrane is not injected, not erythematous and not bulging  No middle ear effusion  Left Ear: Tympanic membrane normal  No drainage  Tympanic membrane is not injected, not erythematous and not bulging  No middle ear effusion     Nose: Nose normal    Mouth/Throat: Oropharynx is clear and moist  No oropharyngeal exudate  Eyes: Conjunctivae are normal  Right eye exhibits no discharge  Left eye exhibits no discharge  Right conjunctiva is not injected  Left conjunctiva is not injected  Neck: Normal range of motion  Neck supple  No neck rigidity  Cardiovascular: Regular rhythm, normal heart sounds and intact distal pulses  Tachycardia present  Exam reveals no gallop and no friction rub  No murmur heard  Pulmonary/Chest: Effort normal  No stridor  No respiratory distress  She has no wheezes  She has no rales  Abdominal: Soft  Bowel sounds are normal  She exhibits no distension  There is no tenderness  There is no rebound and no guarding  Lymphadenopathy:     She has no cervical adenopathy  Neurological: She is alert and oriented to person, place, and time  Skin: Skin is warm and dry  No rash noted  No pallor  Nursing note and vitals reviewed  ED Medications  Medications   acetaminophen (TYLENOL) tablet 650 mg (650 mg Oral Given 3/22/18 1206)   sodium chloride 0 9 % bolus 1,000 mL (0 mL Intravenous Stopped 3/22/18 1438)   ondansetron (ZOFRAN) injection 4 mg (4 mg Intravenous Given 3/22/18 1359)   ketorolac (TORADOL) injection 15 mg (15 mg Intravenous Given 3/22/18 1400)   levofloxacin (LEVAQUIN) IVPB (premix) 750 mg (0 mg Intravenous Stopped 3/22/18 1547)       Diagnostic Studies  Results Reviewed     Procedure Component Value Units Date/Time    Lactic acid, plasma [[id number]]  (Normal) Collected:  03/22/18 1357    Lab Status:  Final result Specimen:  Blood from Arm, Left Updated:  03/22/18 1425     LACTIC ACID 0 9 mmol/L     Narrative:         Result may be elevated if tourniquet was used during collection      Comprehensive metabolic panel [[id number]] Collected:  03/22/18 1357    Lab Status:  Final result Specimen:  Blood from Arm, Left Updated:  03/22/18 1422     Sodium 137 mmol/L      Potassium 3 6 mmol/L      Chloride 103 mmol/L      CO2 22 mmol/L      Anion Gap 12 mmol/L      BUN 6 mg/dL Creatinine 0 75 mg/dL      Glucose 101 mg/dL      Calcium 8 7 mg/dL      AST 13 U/L      ALT 13 U/L      Alkaline Phosphatase 61 U/L      Total Protein 7 0 g/dL      Albumin 3 5 g/dL      Total Bilirubin 0 42 mg/dL      eGFR 101 ml/min/1 73sq m     Narrative:         National Kidney Disease Education Program recommendations are as follows:  GFR calculation is accurate only with a steady state creatinine  Chronic Kidney disease less than 60 ml/min/1 73 sq  meters  Kidney failure less than 15 ml/min/1 73 sq  meters  Benito Walters [[id number]]  (Normal) Collected:  03/22/18 1357    Lab Status:  Final result Specimen:  Blood from Arm, Left Updated:  03/22/18 1417     Protime 14 4 seconds      INR 1 12    APTT [[id number]]  (Normal) Collected:  03/22/18 1357    Lab Status:  Final result Specimen:  Blood from Arm, Left Updated:  03/22/18 1417     PTT 31 seconds     Narrative:          Therapeutic Heparin Range = 60-90 seconds    CBC and differential [[id number]]  (Abnormal) Collected:  03/22/18 1357    Lab Status:  Final result Specimen:  Blood from Arm, Left Updated:  03/22/18 1407     WBC 7 53 Thousand/uL      RBC 3 72 (L) Million/uL      Hemoglobin 10 0 (L) g/dL      Hematocrit 30 8 (L) %      MCV 83 fL      MCH 26 9 pg      MCHC 32 5 g/dL      RDW 12 9 %      MPV 10 6 fL      Platelets 425 Thousands/uL      nRBC 0 /100 WBCs      Neutrophils Relative 85 (H) %      Lymphocytes Relative 8 (L) %      Monocytes Relative 7 %      Eosinophils Relative 0 %      Basophils Relative 0 %      Neutrophils Absolute 6 39 Thousands/µL      Lymphocytes Absolute 0 58 (L) Thousands/µL      Monocytes Absolute 0 55 Thousand/µL      Eosinophils Absolute 0 00 Thousand/µL      Basophils Absolute 0 01 Thousands/µL     Urine Microscopic [[id number]]  (Abnormal) Collected:  03/22/18 1333    Lab Status:  Final result Specimen:  Urine from Urine, Clean Catch Updated:  03/22/18 1357     RBC, UA 2-4 (A) /hpf      WBC, UA Innumerable (A) /hpf Epithelial Cells Occasional /hpf      Bacteria, UA Moderate (A) /hpf     Urine culture [[id number]] Collected:  03/22/18 1333    Lab Status: In process Specimen:  Urine from Urine, Clean Catch Updated:  03/22/18 1357    POCT urinalysis dipstick [[id number]]  (Abnormal) Resulted:  03/22/18 1336    Lab Status:  Final result Updated:  03/22/18 1336    POCT pregnancy, urine [[id number]]  (Normal) Resulted:  03/22/18 1335    Lab Status:  Final result Updated:  03/22/18 1335     EXT PREG TEST UR (Ref: Negative) NEG ( - )    ED Urine Macroscopic [[id number]]  (Abnormal) Collected:  03/22/18 1333    Lab Status:  Final result Specimen:  Urine Updated:  03/22/18 1334     Color, UA Yellow     Clarity, UA Slightly Cloudy     pH, UA 7 0     Leukocytes, UA Moderate (A)     Nitrite, UA Negative     Protein, UA 30 (1+) (A) mg/dl      Glucose, UA Negative mg/dl      Ketones, UA Negative mg/dl      Urobilinogen, UA 0 2 E U /dl      Bilirubin, UA Negative     Blood, UA Moderate (A)     Specific [place], UA 1 015    Narrative:       CLINITEK RESULT                 No orders to display              Procedures  Procedures       Phone Contacts  ED Phone Contact    ED Course  ED Course as of Mar 22 1601   Thu Mar 22, 2018   1420 Urology paged    [phone] Urology repaged    [phone] Discussed case with urology PA who will discuss it with Dr Odessa Aragon  [phone] Urology PA called back  She states she was told pt is reliable and that they are comfortable having her go home on PO Levaquin  Pt is to call the office tomorrow so they can see her early next week  1501 Pt updated on results and plan  Pt states she feels a little better  I instructed her to fall the urologist tomorrow  Pt agrees  Pt notes she has anti-nauseous meds at home and doesn't need another rx for them      1504 Improved from 103 1F Temperature: 99 9 °F (37 7 °C)   1504 Improved from 122 Pulse: 96                               MDM  CritCare Time    Disposition  Final diagnoses:   Acute urinary tract infection   Fever     Time reflects when diagnosis was documented in both MDM as applicable and the Disposition within this note     Time User Action Codes Description Comment    3/22/2018  3:02 PM Bahman Young 46 [N39 0] Acute urinary tract infection     3/22/2018  3:02 PM Bahman Young 46 [R50 9] Fever       ED Disposition     ED Disposition Condition Comment    Discharge  Géensis Villagran discharge to home/self care  Condition at discharge: Good        Follow-up Information     Follow up With Specialties Details Why Contact Info    Kaylen Ahumada PA-C Urology, Physician Assistant Call in 1 day For follow up appointment [address]  [address]  [phone]          Patient's Medications   Discharge Prescriptions    LEVOFLOXACIN (LEVAQUIN) 750 MG TABLET    Take 1 tablet (750 mg total) by mouth every 24 hours for 4 days Start on 3/23/18 (pt had first dose in ER on 3/22/18)       Start Date: 3/22/2018 End Date: 3/26/2018       Order Dose: 750 mg       Quantity: 4 tablet    Refills: 0     No discharge procedures on file      ED Provider  Electronically Signed by           Omar Montez 22, DO  03/22/18 1324

## 2018-03-22 NOTE — DISCHARGE INSTRUCTIONS
Urinary Tract Infection in Women, Ambulatory Care   GENERAL INFORMATION:   A urinary tract infection (UTI)  is caused by bacteria that get inside your urinary tract  Most bacteria that enter your urinary tract are expelled when you urinate  If the bacteria stay in your urinary tract, you may get an infection  Your urinary tract includes your kidneys, ureters, bladder, and urethra  Urine is made in your kidneys, and it flows from the ureters to the bladder  Urine leaves the bladder through the urethra  A UTI is more common in your lower urinary tract, which includes your bladder and urethra  Common symptoms include the following:   · Urinating more often or waking from sleep to urinate    · Pain or burning when you urinate    · Pain or pressure in your lower abdomen     · Urine that smells bad    · Blood in your urine    · Leaking urine  Seek immediate care for the following symptoms:   · Urinating very little or not at all    · Vomiting    · Shaking chills with a fever    · Side or back pain that gets worse  Treatment for a UTI  may include medicines to treat a bacterial infection  You may also need medicines to decrease pain and burning, or decrease the urge to urinate often  Prevent a UTI:   · Urinate when you feel the urge  Do not hold your urine  Urinate as soon as you feel you have to  · Drink liquids as directed  Ask how much liquid to drink each day and which liquids are best for you  You may need to drink more fluids than usual to help flush out the bacteria  Do not drink alcohol, caffeine, and citrus juices  These can irritate your bladder and increase your symptoms  · Apply heat  on your abdomen for 20 to 30 minutes every 2 hours for as many days as directed  Heat helps decrease discomfort and pressure in your bladder  Follow up with your healthcare provider as directed:  Write down your questions so you remember to ask them during your visits     CARE AGREEMENT:   You have the right to help plan your care  Learn about your health condition and how it may be treated  Discuss treatment options with your caregivers to decide what care you want to receive  You always have the right to refuse treatment  The above information is an  only  It is not intended as medical advice for individual conditions or treatments  Talk to your doctor, nurse or pharmacist before following any medical regimen to see if it is safe and effective for you  © 2014 [address] is for End User's use only and may not be sold, redistributed or otherwise used for commercial purposes  All illustrations and images included in CareNotes® are the copyrighted property of [employer]  or Daniel Leonardo

## 2018-03-23 NOTE — ED PROVIDER NOTES
History  Chief Complaint   Patient presents with    Fever - 9 weeks to 74 years     Patient reports was seen earlier and discharged with a UTI  Patient's  reports patient developed a fever of 103 5 while at home and complained of chills and fatigue  Medicated with motrin at 1830  History provided by:  Spouse   used: No    Fever - 9 weeks to 74 years   Max temp prior to arrival:    103  Severity:  Moderate  Onset quality:  Sudden  Timing:  Intermittent  Progression:  Waxing and waning  Chronicity:  New  Relieved by:  Ibuprofen  Worsened by:  Nothing  Associated symptoms: chills, myalgias and nausea    Associated symptoms: no chest pain, no congestion, no rhinorrhea, no somnolence and no vomiting    Risk factors: recent sickness        Prior to Admission Medications   Prescriptions Last Dose Informant Patient Reported? Taking?   levofloxacin (LEVAQUIN) 750 mg tablet   No No   Sig: Take 1 tablet (750 mg total) by mouth every 24 hours for 4 days Start on 3/23/18 (pt had first dose in ER on 3/22/18)      Facility-Administered Medications: None       Past Medical History:   Diagnosis Date    Hydronephrosis     Migraine     PONV (postoperative nausea and vomiting)     Ureteral stricture        Past Surgical History:   Procedure Laterality Date    APPENDECTOMY      CYSTOSCOPY W/ LASER LITHOTRIPSY Right 11/2/2017    Procedure: CYSTOSCOPY;  RIGHT URETEROSCOPY WITH  HOLMIUM LASER INCISION OF URETERAL STRICTURE; (WITH HYDROSTATIC RIGHT URETERAL DILATION), BILATERAL RETROGRADE PYELOGRAM AND INSERTION OF RIGHT URETERAL STENT X 2 (4 7 X 26);   Surgeon: Romie Durán MD;  [facility];  Service: Urology    DILATION AND CURETTAGE OF UTERUS N/A 12/30/2016    Procedure: DILATATION AND CURETTAGE;  Surgeon: Virgilio Hairston MD;  [facility];  Service:    [address] [place] [address] N/A 5/22/2017    Procedure: Marlene Guardado UNDER ANESTHESIA;  Surgeon: Virgilio Hairston MD; [facility];  Service:     HYSTEROSCOPY N/A 12/30/2016    Procedure: HYSTEROSCOPY;  Surgeon: Griffin Em MD;  [facility];  Service:    [employer] [place] CYSTOURETHROSCOPY,URETER CATHETER Bilateral 3/27/2017    Procedure: CYSTOSCOPY WITH RETROGRADE PYELOGRAM; RIGHT STENT INSERTION;  Surgeon: Boby Toussaint MD;  [facility];  Service: Urology    [place] CYSTOURETHROSCOPY,URETER CATHETER N/A 1/11/2018    Procedure: CYSTOSCOPY , BILATERAL RETROGRADE PYELOGRAM WITH RIGHT STENT EXTRACTION;  Surgeon: Boby Toussaint MD;  [facility];  Service: Urology    [place] CYSTOURETHROSCOPY,URETER CATHETER Right 1/15/2018    Procedure: CYSTOSCOPY, RIGHT RETROGRADE PYELOGRAM WITH INSERTION OF RIGHT STENT URETERAL;  Surgeon: Boby Toussaint MD;  [facility];  Service: Urology    [place] LAPAROSCOPY W TOT HYSTERECT UTERUS 250 GRAM OR LESS N/A 3/24/2017    Procedure: TOTAL LAPAROSCOPIC HYSTERECTOMY   bilateral salpingectomy, cysto; Surgeon: Griffin Em MD;  [facility];  Service: Gynecology    REIMPLANT URETER IN BLADDER Right 2/22/2018    Procedure: URETERAL NEOCYSTOSTOMY, EXCISION OF URETERAL STRICTURE ;  Surgeon: Boby Toussaint MD;  [facility];  Service: Urology    URETERAL STENT PLACEMENT Right 2/22/2018    Procedure: INSERTION STENT URETERAL;  Surgeon: Boby Toussaint MD;  [facility];  Service: Urology       Family History   Problem Relation Age of Onset    Heart attack Mother     Hypertension Mother     Colon cancer Brother      I have reviewed and agree with the history as documented  Social History   Substance Use Topics    Smoking status: Former Smoker     Packs/day: 0 20     Types: Cigarettes     Quit date: 2007    Smokeless tobacco: Never Used    Alcohol use 1 2 oz/week     2 Glasses of wine per week        Review of Systems   Constitutional: Positive for chills and fever  HENT: Negative for congestion and rhinorrhea  Eyes: Negative for pain  Cardiovascular: Negative for chest pain  Gastrointestinal: Positive for nausea  Negative for vomiting  Endocrine: Negative for polydipsia  Genitourinary: Negative for enuresis  Musculoskeletal: Positive for myalgias  Allergic/Immunologic: Negative for food allergies  Neurological: Negative for facial asymmetry  Hematological: Negative for adenopathy  Psychiatric/Behavioral: Negative for behavioral problems  Physical Exam  ED Triage Vitals [03/22/18 1937]   Temperature Pulse Respirations Blood Pressure SpO2   99 3 °F (37 4 °C) (!) 138 16 103/55 98 %      Temp Source Heart Rate Source Patient Position - Orthostatic VS BP Location FiO2 (%)   Oral Monitor Sitting Left arm --      Pain Score       No Pain           Orthostatic Vital Signs  Vitals:    03/22/18 1937 03/22/18 1946 03/22/18 2045   BP: 103/55     Pulse: (!) 138 (!) 119 (!) 108   Patient Position - Orthostatic VS: Sitting         Physical Exam   Constitutional: She appears well-developed and well-nourished  Patient appears in mild distress  HENT:   Head: Normocephalic and atraumatic  Eyes: Conjunctivae are normal    Neck: Neck supple  No JVD present  Cardiovascular: Normal rate  Pulmonary/Chest: Effort normal    Abdominal: Soft  There is no CVA tenderness  Genitourinary:   Genitourinary Comments: No complaints defer   Musculoskeletal: She exhibits no edema  Neurological: She is alert  Skin: Skin is warm and dry  Capillary refill takes less than 2 seconds  Psychiatric: She has a normal mood and affect         ED Medications  Medications   acetaminophen (TYLENOL) tablet 975 mg (975 mg Oral Given 3/22/18 2034)       Diagnostic Studies  Results Reviewed     None                 No orders to display              Procedures  Procedures       Phone Contacts  ED Phone Contact    ED Course  ED Course                                MDM  Number of Diagnoses or Management Options  Acute urinary tract infection:   Fever: Diagnosis management comments:  78-year-old female with complicated urological issues presents emergency department earlier in the day diagnosed with urinary tract infection  Urology had been involved with her care and given specific instructions about discharge  Patient returns to the emergency department for fever at home  Temperature in the department is  98 4  Patient admits to improvement in the department  Patient had been given antibiotics earlier in the day  At this time patient is felt stable for discharge to home  Strict return instructions were given the patient and male significant other  Encourage patient to take ibuprofen and Tylenol as prescribed routinely with the next day or two  Encourage patient to obtain antibiotics as prescribed and take as prescribed  Educated on persistent or worsening signs symptoms and to return to the emergency department  Patient male significant other admit to understanding and agreement  Patient was discharged in improved condition  No further lab or radiographic data was felt needed during this visit as they were or the obtained earlier in the day  CritCare Time    Disposition  Final diagnoses:   Acute urinary tract infection   Fever     Time reflects when diagnosis was documented in both MDM as applicable and the Disposition within this note     Time User Action Codes Description Comment    3/22/2018  8:42 PM Odin Yepez Add [N39 0] Acute urinary tract infection     3/22/2018  8:42 PM Panda Castellon Add [R50 9] Fever       ED Disposition     ED Disposition Condition Comment    Discharge  Génesis Keatingshreyas discharge to home/self care      Condition at discharge: Stable        Follow-up Information     Follow up With Specialties Details Why [address] [facility] Emergency Department Emergency Medicine  If symptoms worsen [address]  [phone] [address] [place] [address] ED, [address] , [place], [place], [address]        Discharge Medication List as of 3/22/2018  8:44 PM      START taking these medications    Details   acetaminophen (TYLENOL) 500 mg tablet Take 1 tablet (500 mg total) by mouth every 6 (six) hours as needed for mild pain, moderate pain, headaches or fever, Starting Thu 3/22/2018, Print      ibuprofen (MOTRIN) 400 mg tablet Take 1 tablet (400 mg total) by mouth every 6 (six) hours as needed for mild pain, Starting Thu 3/22/2018, Print         CONTINUE these medications which have NOT CHANGED    Details   levofloxacin (LEVAQUIN) 750 mg tablet Take 1 tablet (750 mg total) by mouth every 24 hours for 4 days Start on 3/23/18 (pt had first dose in ER on 3/22/18), Starting Thu 3/22/2018, Until Mon 3/26/2018, Print           No discharge procedures on file      ED Provider  Electronically Signed by           Addy Fraga PA-C  03/22/18 9917

## 2018-03-23 NOTE — DISCHARGE INSTRUCTIONS

## 2018-03-25 LAB — BACTERIA UR CULT: ABNORMAL

## 2018-03-27 ENCOUNTER — OFFICE VISIT (OUTPATIENT)
Dept: UROLOGY | Facility: CLINIC | Age: 38
End: 2018-03-27
Payer: COMMERCIAL

## 2018-03-27 VITALS
WEIGHT: 127 LBS | SYSTOLIC BLOOD PRESSURE: 107 MMHG | HEIGHT: 65 IN | BODY MASS INDEX: 21.16 KG/M2 | DIASTOLIC BLOOD PRESSURE: 74 MMHG | HEART RATE: 85 BPM

## 2018-03-27 DIAGNOSIS — N13.1 HYDRONEPHROSIS WITH URETERAL STRICTURE, NOT ELSEWHERE CLASSIFIED: ICD-10-CM

## 2018-03-27 DIAGNOSIS — R31.9 URINARY TRACT INFECTION WITH HEMATURIA, SITE UNSPECIFIED: Primary | ICD-10-CM

## 2018-03-27 DIAGNOSIS — N39.0 URINARY TRACT INFECTION WITH HEMATURIA, SITE UNSPECIFIED: Primary | ICD-10-CM

## 2018-03-27 LAB
SL AMB  POCT GLUCOSE, UA: ABNORMAL
SL AMB LEUKOCYTE ESTERASE,UA: ABNORMAL
SL AMB POCT BLOOD,UA: ABNORMAL
SL AMB POCT CLARITY,UA: CLEAR
SL AMB POCT COLOR,UA: YELLOW
SL AMB POCT KETONES,UA: ABNORMAL
SL AMB POCT NITRITE,UA: ABNORMAL
SL AMB POCT PH,UA: 5
SL AMB POCT URINE PROTEIN: ABNORMAL

## 2018-03-27 PROCEDURE — 99024 POSTOP FOLLOW-UP VISIT: CPT | Performed by: UROLOGY

## 2018-03-27 PROCEDURE — 81002 URINALYSIS NONAUTO W/O SCOPE: CPT | Performed by: UROLOGY

## 2018-03-27 NOTE — PROGRESS NOTES
Progress Note - Urology  Travis Guy 42 y o  female MRN: [id number]  Encounter: [id number]      Chief Complaint:   Chief Complaint   Patient presents with   [address] Other     Pt just finished Levofloxacin yesterday  HPI:     47-year-old female had a right ureteral reimplantation and after multiple attempts to correct a stricture secondary to a gynecologic complication  She recently had a Pseudomonas UTI and was treated with 5 days of level Floxin 750 milligram daily  She is completely asymptomatic at this time  No UTI symptoms  No hematuria  Ultrasound shows resolution of the hydronephrosis  MEDS:    Current Outpatient Prescriptions:     acetaminophen (TYLENOL) 500 mg tablet, Take 1 tablet (500 mg total) by mouth every 6 (six) hours as needed for mild pain, moderate pain, headaches or fever, Disp: 30 tablet, Rfl: 0    ibuprofen (MOTRIN) 400 mg tablet, Take 1 tablet (400 mg total) by mouth every 6 (six) hours as needed for mild pain, Disp: 30 tablet, Rfl: 0      PMH:  Past Medical History:   Diagnosis Date    Hydronephrosis     Migraine     PONV (postoperative nausea and vomiting)     Ureteral stricture          ROS:  Review of Systems      Vitals:  Blood pressure 107/74, pulse 85, height 5' 5" (1 651 m), weight 57 6 kg (127 lb)  Physical Exam:     Back reveals no CVA tenderness  Abdomen is soft    Wound is healed      Lab, Imaging and other studies:  Recent Results (from the past 48 hour(s))   POCT urine dip    Collection Time: 03/27/18  3:08 PM   Result Value Ref Range    LEUKOCYTE ESTERASE,UA NEG      NITRITE,UA NEG     SL AMB POCT URINE PROTEIN TRACE      PH,UA 5      BLOOD,UA +      KETONES,UA NEG     GLUCOSE, UA NEG      COLOR,UA YELLOW      CLARITY,UA CLEAR          IMPRESSION:   right hydronephrosis secondary to ureteral stricture from gynecologic complication     Pseudomonas UTI resolved    PLAN:   continue surveillance recheck ultrasound in 6 months

## 2018-10-03 ENCOUNTER — HOSPITAL ENCOUNTER (OUTPATIENT)
Dept: RADIOLOGY | Age: 38
Discharge: HOME | End: 2018-10-03
Payer: COMMERCIAL

## 2018-10-03 DIAGNOSIS — N13.30 HYDRONEPHROSIS, UNSPECIFIED HYDRONEPHROSIS TYPE: ICD-10-CM

## 2018-10-03 PROCEDURE — 76770 US EXAM ABDO BACK WALL COMP: CPT

## 2018-10-08 ENCOUNTER — TELEPHONE (OUTPATIENT)
Dept: UROLOGY | Facility: CLINIC | Age: 38
End: 2018-10-08

## 2018-10-29 ENCOUNTER — TELEPHONE (OUTPATIENT)
Dept: UROLOGY | Facility: CLINIC | Age: 38
End: 2018-10-29

## 2018-10-29 NOTE — TELEPHONE ENCOUNTER
I called pt to try and r/s her appt from 10/05/18  There was no answer so I did l/m asking pt to call our office back to get this appt r/s'd

## 2019-04-27 ENCOUNTER — APPOINTMENT (OUTPATIENT)
Dept: LAB | Age: 39
End: 2019-04-27
Payer: COMMERCIAL

## 2019-04-27 ENCOUNTER — TRANSCRIBE ORDERS (OUTPATIENT)
Dept: URGENT CARE | Facility: MEDICAL CENTER | Age: 39
End: 2019-04-27

## 2019-04-27 DIAGNOSIS — Z02.1 PRE-EMPLOYMENT HEALTH SCREENING EXAMINATION: ICD-10-CM

## 2019-04-27 DIAGNOSIS — Z02.1 PRE-EMPLOYMENT HEALTH SCREENING EXAMINATION: Primary | ICD-10-CM

## 2019-04-27 LAB — RUBV IGG SERPL IA-ACNC: 127.2 IU/ML

## 2019-04-27 PROCEDURE — 86765 RUBEOLA ANTIBODY: CPT

## 2019-04-27 PROCEDURE — 86735 MUMPS ANTIBODY: CPT

## 2019-04-27 PROCEDURE — 86480 TB TEST CELL IMMUN MEASURE: CPT

## 2019-04-27 PROCEDURE — 86762 RUBELLA ANTIBODY: CPT

## 2019-04-27 PROCEDURE — 86706 HEP B SURFACE ANTIBODY: CPT

## 2019-04-27 PROCEDURE — 36415 COLL VENOUS BLD VENIPUNCTURE: CPT

## 2019-04-27 PROCEDURE — 86787 VARICELLA-ZOSTER ANTIBODY: CPT

## 2019-04-28 LAB — HBV SURFACE AB SER-ACNC: <3.1 MIU/ML

## 2019-04-29 LAB
GAMMA INTERFERON BACKGROUND BLD IA-ACNC: 0.02 IU/ML
M TB IFN-G BLD-IMP: NEGATIVE
M TB IFN-G CD4+ BCKGRND COR BLD-ACNC: -0.01 IU/ML
M TB IFN-G CD4+ BCKGRND COR BLD-ACNC: 0 IU/ML
MITOGEN IGNF BCKGRD COR BLD-ACNC: >10 IU/ML
VZV IGG SER IA-ACNC: NORMAL

## 2019-04-30 LAB
MEV IGG SER QL: NORMAL
MUV IGG SER QL: NORMAL

## 2019-10-08 ENCOUNTER — OFFICE VISIT (OUTPATIENT)
Dept: URGENT CARE | Age: 39
End: 2019-10-08
Payer: COMMERCIAL

## 2019-10-08 VITALS
HEART RATE: 78 BPM | SYSTOLIC BLOOD PRESSURE: 97 MMHG | TEMPERATURE: 98.6 F | DIASTOLIC BLOOD PRESSURE: 67 MMHG | HEIGHT: 65 IN | BODY MASS INDEX: 23.69 KG/M2 | RESPIRATION RATE: 18 BRPM | WEIGHT: 142.2 LBS | OXYGEN SATURATION: 100 %

## 2019-10-08 DIAGNOSIS — J01.90 ACUTE NON-RECURRENT SINUSITIS, UNSPECIFIED LOCATION: Primary | ICD-10-CM

## 2019-10-08 PROCEDURE — S9088 SERVICES PROVIDED IN URGENT: HCPCS | Performed by: PHYSICIAN ASSISTANT

## 2019-10-08 PROCEDURE — 99213 OFFICE O/P EST LOW 20 MIN: CPT | Performed by: PHYSICIAN ASSISTANT

## 2019-10-08 RX ORDER — AMOXICILLIN AND CLAVULANATE POTASSIUM 875; 125 MG/1; MG/1
1 TABLET, FILM COATED ORAL EVERY 12 HOURS SCHEDULED
Qty: 20 TABLET | Refills: 0 | Status: SHIPPED | OUTPATIENT
Start: 2019-10-08 | End: 2019-10-18

## 2019-10-08 RX ORDER — FLUTICASONE PROPIONATE 50 MCG
1 SPRAY, SUSPENSION (ML) NASAL DAILY
Qty: 1 BOTTLE | Refills: 0 | Status: SHIPPED | OUTPATIENT
Start: 2019-10-08 | End: 2022-03-24

## 2019-10-08 NOTE — PATIENT INSTRUCTIONS

## 2019-10-08 NOTE — LETTER
October 8, 2019     Patient: Hortensia Morgan   YOB: 1980   Date of Visit: 10/8/2019       To Whom It May Concern: It is my medical opinion that Hortensia Morgan may return to work on 10/10/19  If you have any questions or concerns, please don't hesitate to call           Sincerely,        Rashaun Millard PA-C    CC: No Recipients

## 2019-10-08 NOTE — PROGRESS NOTES
[facility]        NAME: Anna Davis is a 37 y o  female  : 1980    MRN: [id number]  DATE: 2019  TIME: 10:13 AM    Assessment and Plan   Acute non-recurrent sinusitis, unspecified location [J01 90]  1  Acute non-recurrent sinusitis, unspecified location  amoxicillin-clavulanate (AUGMENTIN) 875-125 mg per tablet    fluticasone (FLONASE) 50 mcg/act nasal spray         Patient Instructions       Follow up with PCP in 3-5 days  Proceed to  ER if symptoms worsen  Chief Complaint     Chief Complaint   Patient presents with    Nasal Congestion     Pt reports starting to have congestion starting on Saturday  Pt reports having nasal congestion accompanied with a cough and body aches  Pt reports no noted fevers  Pain upon arrival is 0/10  History of Present Illness       Pt with cough and nasal congestion x 3-4 days     Sinusitis   This is a new problem  The current episode started in the past 7 days  The problem is unchanged  There has been no fever  The fever has been present for 3 to 4 days  Her pain is at a severity of 3/10  The pain is mild  Associated symptoms include congestion, coughing, sinus pressure and a sore throat  Pertinent negatives include no chills, diaphoresis, ear pain, headaches, hoarse voice, neck pain, shortness of breath, sneezing or swollen glands  Past treatments include nothing  The treatment provided no relief  Review of Systems   Review of Systems   Constitutional: Negative  Negative for chills and diaphoresis  HENT: Positive for congestion, sinus pressure and sore throat  Negative for ear pain, hoarse voice and sneezing  Eyes: Negative  Respiratory: Positive for cough  Negative for shortness of breath  Cardiovascular: Negative  Gastrointestinal: Negative  Endocrine: Negative  Genitourinary: Negative  Musculoskeletal: Negative  Negative for neck pain  Skin: Negative  Allergic/Immunologic: Negative  Neurological: Negative  Negative for headaches  Hematological: Negative  Psychiatric/Behavioral: Negative  All other systems reviewed and are negative  Current Medications       Current Outpatient Medications:     acetaminophen (TYLENOL) 500 mg tablet, Take 1 tablet (500 mg total) by mouth every 6 (six) hours as needed for mild pain, moderate pain, headaches or fever, Disp: 30 tablet, Rfl: 0    ibuprofen (MOTRIN) 400 mg tablet, Take 1 tablet (400 mg total) by mouth every 6 (six) hours as needed for mild pain, Disp: 30 tablet, Rfl: 0    amoxicillin-clavulanate (AUGMENTIN) 875-125 mg per tablet, Take 1 tablet by mouth every 12 (twelve) hours for 10 days, Disp: 20 tablet, Rfl: 0    fluticasone (FLONASE) 50 mcg/act nasal spray, 1 spray into each nostril daily, Disp: 1 Bottle, Rfl: 0    Current Allergies     Allergies as of 10/08/2019 - Reviewed 10/08/2019   Allergen Reaction Noted    Shellfish-derived products Anaphylaxis 03/27/2017    Fentanyl Itching 03/22/2018            The following portions of the patient's history were reviewed and updated as appropriate: allergies, current medications, past family history, past medical history, past social history, past surgical history and problem list      Past Medical History:   Diagnosis Date    Hydronephrosis     Migraine     PONV (postoperative nausea and vomiting)     TMJ arthralgia     Resolved 10/29/2015     Ureteral stricture        Past Surgical History:   Procedure Laterality Date    APPENDECTOMY      CYSTOSCOPY W/ LASER LITHOTRIPSY Right 11/2/2017    Procedure: CYSTOSCOPY;  RIGHT URETEROSCOPY WITH  HOLMIUM LASER INCISION OF URETERAL STRICTURE; (WITH HYDROSTATIC RIGHT URETERAL DILATION), BILATERAL RETROGRADE PYELOGRAM AND INSERTION OF RIGHT URETERAL STENT X 2 (4 7 X 26);   Surgeon: Deandre Eduardo MD;  [facility];  Service: Urology    DILATION AND CURETTAGE OF UTERUS N/A 12/30/2016    Procedure: DILATATION AND CURETTAGE; Surgeon: Vianney Hilton MD;  [facility];  Service:    [facility] AND EVACUATION      HYSTERECTOMY N/A 5/22/2017    Procedure: EXAM UNDER ANESTHESIA;  Surgeon: Vianney Hilton MD;  [facility];  Service:     HYSTEROSCOPY N/A 12/30/2016    Procedure: HYSTEROSCOPY;  Surgeon: Vianney Hilton MD;  [facility];  Service:    [facility] [place] CYSTOURETHROSCOPY,URETER CATHETER Bilateral 3/27/2017    Procedure: CYSTOSCOPY WITH RETROGRADE PYELOGRAM; RIGHT STENT INSERTION;  Surgeon: Cindy Capellan MD;  [facility];  Service: Urology    [place] CYSTOURETHROSCOPY,URETER CATHETER N/A 1/11/2018    Procedure: CYSTOSCOPY , BILATERAL RETROGRADE PYELOGRAM WITH RIGHT STENT EXTRACTION;  Surgeon: Cindy Capellan MD;  [facility];  Service: Urology    [place] CYSTOURETHROSCOPY,URETER CATHETER Right 1/15/2018    Procedure: CYSTOSCOPY, RIGHT RETROGRADE PYELOGRAM WITH INSERTION OF RIGHT STENT URETERAL;  Surgeon: Cindy Capellan MD;  [facility];  Service: Urology    [place] LAPAROSCOPY W TOT HYSTERECT UTERUS 250 GRAM OR LESS N/A 3/24/2017    Procedure: TOTAL LAPAROSCOPIC HYSTERECTOMY   bilateral salpingectomy, cysto; Surgeon: Vianney Hilton MD;  [facility];  Service: Gynecology    REIMPLANT URETER IN BLADDER Right 2/22/2018    Procedure: URETERAL NEOCYSTOSTOMY, EXCISION OF URETERAL STRICTURE ;  Surgeon: Cindy Capellan MD;  [facility];  Service: Urology    URETERAL STENT PLACEMENT Right 2/22/2018    Procedure: INSERTION STENT URETERAL;  Surgeon: Cindy Capellan MD;  [facility];  Service: Urology       Family History   Problem Relation Age of Onset    Heart attack Mother     Hypertension Mother     Colon cancer Brother     Diabetes type II Paternal Grandfather          Medications have been verified          Objective   BP 97/67   Pulse 78   Temp 98 6 °F (37 °C)   Resp 18   Ht 5' 5" (1 651 m)   Wt 64 5 kg (142 lb 3 2 oz)   SpO2 100%   BMI 23 66 kg/m²        Physical Exam     Physical Exam   Constitutional: She is oriented to person, place, and time  She appears well-developed and well-nourished  HENT:   Head: Normocephalic and atraumatic  Right Ear: External ear normal    Left Ear: External ear normal    Mouth/Throat: Oropharynx is clear and moist    Yellow nasal d/c  Max and frontal  sinus pressure   Boggy nasal mucosa     Eyes: Pupils are equal, round, and reactive to light  Conjunctivae and EOM are normal    Neck: Normal range of motion  Neck supple  Cardiovascular: Normal rate, regular rhythm, normal heart sounds and intact distal pulses  Pulmonary/Chest: Effort normal and breath sounds normal    Abdominal: Soft  Bowel sounds are normal    Musculoskeletal: Normal range of motion  Neurological: She is alert and oriented to person, place, and time  Skin: Skin is warm  Capillary refill takes less than 2 seconds  Psychiatric: She has a normal mood and affect  Her behavior is normal    Nursing note and vitals reviewed

## 2020-01-09 ENCOUNTER — OFFICE VISIT (OUTPATIENT)
Dept: FAMILY MEDICINE CLINIC | Facility: CLINIC | Age: 40
End: 2020-01-09
Payer: COMMERCIAL

## 2020-01-09 VITALS
DIASTOLIC BLOOD PRESSURE: 80 MMHG | WEIGHT: 146.6 LBS | RESPIRATION RATE: 16 BRPM | TEMPERATURE: 97.4 F | SYSTOLIC BLOOD PRESSURE: 118 MMHG | HEART RATE: 70 BPM | BODY MASS INDEX: 24.4 KG/M2 | OXYGEN SATURATION: 98 %

## 2020-01-09 DIAGNOSIS — Z80.0 FAMILY HISTORY OF COLON CANCER: ICD-10-CM

## 2020-01-09 DIAGNOSIS — Z12.11 SCREENING FOR COLON CANCER: ICD-10-CM

## 2020-01-09 DIAGNOSIS — K64.4 EXTERNAL HEMORRHOID: Primary | ICD-10-CM

## 2020-01-09 PROCEDURE — 99203 OFFICE O/P NEW LOW 30 MIN: CPT | Performed by: FAMILY MEDICINE

## 2020-01-09 NOTE — PROGRESS NOTES
Assessment/Plan:    No problem-specific Assessment & Plan notes found for this encounter  Diagnoses and all orders for this visit:    External hemorrhoid  Comments:  Resolving flare - pt healthy on exam  Continue high fiber diet, good PO hydration, etc  Medicated wipes with flares; pt to call if she wants to try steroid supp  Orders:  -     Ambulatory referral to Gastroenterology; Future    Family history of colon cancer  Comments:  Strong family hx - pt is due for colon cancer screening (has had 2 colonoscopy in the past; last was 8 years ago)  Orders:  -     Ambulatory referral to Gastroenterology; Future    Screening for colon cancer  Comments:  As above  Orders:  -     Ambulatory referral to Gastroenterology; Future          Subjective:      Patient ID: Uvaldo Romo is a 37 y o  female  [facility] is new to the clinic today  Has a hx of intermittent hemorrhoids x 12 years  Around the time she had her first child  Hx of  x 2  Things have calmed down now  Has hx of hysterectomy due to fibroids  Needs colonoscopy - due to family hx of colon cancer (brother at age 28)  Pt exercises regularly - running almost daily; eats healthy; hydrates well  The following portions of the patient's history were reviewed and updated as appropriate: allergies, current medications, past family history, past social history, past surgical history and problem list     Past Medical History:   Diagnosis Date    Hydronephrosis     Migraine     PONV (postoperative nausea and vomiting)     TMJ arthralgia     Resolved 10/29/2015     Ureteral stricture      Past Surgical History:   Procedure Laterality Date    APPENDECTOMY      CYSTOSCOPY W/ LASER LITHOTRIPSY Right 2017    Procedure: CYSTOSCOPY;  RIGHT URETEROSCOPY WITH  HOLMIUM LASER INCISION OF URETERAL STRICTURE; (WITH HYDROSTATIC RIGHT URETERAL DILATION), BILATERAL RETROGRADE PYELOGRAM AND INSERTION OF RIGHT URETERAL STENT X 2 (4 7 X 26);   Surgeon: Warren Tejeda MD;  [facility];  Service: Urology    DILATION AND CURETTAGE OF UTERUS N/A 12/30/2016    Procedure: DILATATION AND CURETTAGE;  Surgeon: Jimmy Capellan MD;  [facility];  Service:    [address] [place] N/A 5/22/2017    Procedure: EXAM UNDER ANESTHESIA;  Surgeon: Jimmy Capellan MD;  [facility];  Service:     HYSTEROSCOPY N/A 12/30/2016    Procedure: HYSTEROSCOPY;  Surgeon: Jimmy Capellan MD;  [facility];  Service:    Andre Hoops AL CYSTOURETHROSCOPY,URETER CATHETER Bilateral 3/27/2017    Procedure: CYSTOSCOPY WITH RETROGRADE PYELOGRAM; RIGHT STENT INSERTION;  Surgeon: Warren Tejeda MD;  [facility];  Service: Urology    AL CYSTOURETHROSCOPY,URETER CATHETER N/A 1/11/2018    Procedure: CYSTOSCOPY , BILATERAL RETROGRADE PYELOGRAM WITH RIGHT STENT EXTRACTION;  Surgeon: Warren Tejeda MD;  [facility];  Service: Urology    AL CYSTOURETHROSCOPY,URETER CATHETER Right 1/15/2018    Procedure: CYSTOSCOPY, RIGHT RETROGRADE PYELOGRAM WITH INSERTION OF RIGHT STENT URETERAL;  Surgeon: Warren Tejeda MD;  [facility];  Service: Urology    AL LAPAROSCOPY W TOT HYSTERECT UTERUS 250 GRAM OR LESS N/A 3/24/2017    Procedure: TOTAL LAPAROSCOPIC HYSTERECTOMY   bilateral salpingectomy, cysto;   Surgeon: Jimmy Capellan MD;  [facility];  Service: Gynecology    REIMPLANT URETER IN BLADDER Right 2/22/2018    Procedure: URETERAL NEOCYSTOSTOMY, EXCISION OF URETERAL STRICTURE ;  Surgeon: Warren Tejeda MD;  [facility];  Service: Urology    URETERAL STENT PLACEMENT Right 2/22/2018    Procedure: INSERTION STENT URETERAL;  Surgeon: Warren Tejeda MD;  [facility];  Service: Urology       Current Outpatient Medications:     acetaminophen (TYLENOL) 500 mg tablet, Take 1 tablet (500 mg total) by mouth every 6 (six) hours as needed for mild pain, moderate pain, headaches or fever (Patient not taking: Reported on 2020), Disp: 30 tablet, Rfl: 0    fluticasone (FLONASE) 50 mcg/act nasal spray, 1 spray into each nostril daily (Patient not taking: Reported on 2020), Disp: 1 Bottle, Rfl: 0    ibuprofen (MOTRIN) 400 mg tablet, Take 1 tablet (400 mg total) by mouth every 6 (six) hours as needed for mild pain (Patient not taking: Reported on 2020), Disp: 30 tablet, Rfl: 0    Allergies   Allergen Reactions    Shellfish-Derived Products Anaphylaxis     THROAT ITCHY MIGRAINES    Fentanyl Itching     Family History   Problem Relation Age of Onset    Heart attack Mother     Hypertension Mother     Colon cancer Brother     Diabetes type II Paternal Grandfather      Social History     Tobacco Use    Smoking status: Former Smoker     Packs/day: 0 20     Types: Cigarettes     Last attempt to quit:      Years since quittin 0    Smokeless tobacco: Never Used   Substance Use Topics    Alcohol use: Yes     Alcohol/week: 2 0 standard drinks     Types: 2 Glasses of wine per week    Drug use: No       Review of Systems   Constitutional: Negative for activity change  Respiratory: Negative for shortness of breath  Cardiovascular: Negative for chest pain  Gastrointestinal: Negative for abdominal pain and blood in stool  No melanotic stools  Objective:      /80   Pulse 70   Temp (!) 97 4 °F (36 3 °C)   Resp 16   Wt 66 5 kg (146 lb 9 6 oz)   SpO2 98%   BMI 24 40 kg/m²          Physical Exam   Constitutional: She is oriented to person, place, and time  She appears well-developed and well-nourished  No distress  HENT:   Head: Normocephalic and atraumatic  Right Ear: Hearing, tympanic membrane, external ear and ear canal normal    Left Ear: Hearing, tympanic membrane, external ear and ear canal normal    Mouth/Throat: Oropharynx is clear and moist  No oropharyngeal exudate  Some cerumen on the right  Eyes: Conjunctivae are normal    Neck: Normal range of motion  Neck supple   No thyromegaly present  Cardiovascular: Normal rate, regular rhythm and normal heart sounds  Exam reveals no gallop and no friction rub  No murmur heard  Pulmonary/Chest: Effort normal and breath sounds normal  No stridor  No respiratory distress  She has no wheezes  She has no rales  Abdominal: Soft  Bowel sounds are normal  She exhibits no distension and no mass  There is no tenderness  There is no rebound and no guarding  Lymphadenopathy:     She has no cervical adenopathy  Neurological: She is alert and oriented to person, place, and time  Skin: She is not diaphoretic  Psychiatric: She has a normal mood and affect  Her behavior is normal  Judgment and thought content normal    Nursing note and vitals reviewed

## 2020-01-27 ENCOUNTER — OFFICE VISIT (OUTPATIENT)
Dept: FAMILY MEDICINE CLINIC | Facility: CLINIC | Age: 40
End: 2020-01-27
Payer: COMMERCIAL

## 2020-01-27 VITALS
WEIGHT: 145.6 LBS | OXYGEN SATURATION: 98 % | BODY MASS INDEX: 24.23 KG/M2 | DIASTOLIC BLOOD PRESSURE: 78 MMHG | SYSTOLIC BLOOD PRESSURE: 132 MMHG | TEMPERATURE: 97.3 F | RESPIRATION RATE: 16 BRPM | HEART RATE: 60 BPM

## 2020-01-27 DIAGNOSIS — H10.32 ACUTE CONJUNCTIVITIS OF LEFT EYE, UNSPECIFIED ACUTE CONJUNCTIVITIS TYPE: Primary | ICD-10-CM

## 2020-01-27 PROCEDURE — 99213 OFFICE O/P EST LOW 20 MIN: CPT | Performed by: FAMILY MEDICINE

## 2020-01-27 RX ORDER — POLYMYXIN B SULFATE AND TRIMETHOPRIM 1; 10000 MG/ML; [USP'U]/ML
1 SOLUTION OPHTHALMIC EVERY 6 HOURS
Qty: 10 ML | Refills: 0 | Status: SHIPPED | OUTPATIENT
Start: 2020-01-27 | End: 2020-02-03

## 2020-01-27 NOTE — PROGRESS NOTES
Assessment/Plan:    No problem-specific Assessment & Plan notes found for this encounter  Diagnoses and all orders for this visit:    Acute conjunctivitis of left eye, unspecified acute conjunctivitis type  Comments:  Pt is stable on exam   Treating with Polytrim Ophth drops at this time  Continue good hand hygiene  Orders:  -     polymyxin b-trimethoprim (POLYTRIM) ophthalmic solution; Administer 1 drop into the left eye every 6 (six) hours for 7 days          Subjective:      Patient ID: Toby Null is a 37 y o  female  Left eye itchy x 4 - 5 days  Some drainage  No fevers or cold symptoms  She does not wear contacts        The following portions of the patient's history were reviewed and updated as appropriate: allergies, current medications, past family history, past social history, past surgical history and problem list     Past Medical History:   Diagnosis Date    Hydronephrosis     Migraine     PONV (postoperative nausea and vomiting)     TMJ arthralgia     Resolved 10/29/2015     Ureteral stricture        Current Outpatient Medications:     acetaminophen (TYLENOL) 500 mg tablet, Take 1 tablet (500 mg total) by mouth every 6 (six) hours as needed for mild pain, moderate pain, headaches or fever (Patient not taking: Reported on 1/9/2020), Disp: 30 tablet, Rfl: 0    fluticasone (FLONASE) 50 mcg/act nasal spray, 1 spray into each nostril daily (Patient not taking: Reported on 1/9/2020), Disp: 1 Bottle, Rfl: 0    ibuprofen (MOTRIN) 400 mg tablet, Take 1 tablet (400 mg total) by mouth every 6 (six) hours as needed for mild pain (Patient not taking: Reported on 1/9/2020), Disp: 30 tablet, Rfl: 0    polymyxin b-trimethoprim (POLYTRIM) ophthalmic solution, Administer 1 drop into the left eye every 6 (six) hours for 7 days, Disp: 10 mL, Rfl: 0    Allergies   Allergen Reactions    Shellfish-Derived Products Anaphylaxis     THROAT ITCHY MIGRAINES    Fentanyl Itching     Social History Tobacco Use    Smoking status: Former Smoker     Packs/day: 0 20     Types: Cigarettes     Last attempt to quit:      Years since quittin 0    Smokeless tobacco: Never Used   Substance Use Topics    Alcohol use: Yes     Alcohol/week: 2 0 standard drinks     Types: 2 Glasses of wine per week    Drug use: No         Review of Systems   Constitutional: Negative for activity change  HENT: Negative for congestion and rhinorrhea  Eyes: Positive for itching  Respiratory: Negative for cough  Objective:      /78   Pulse 60   Temp (!) 97 3 °F (36 3 °C)   Resp 16   Wt 66 kg (145 lb 9 6 oz)   SpO2 98%   BMI 24 23 kg/m²          Physical Exam   Constitutional: She is oriented to person, place, and time  She appears well-developed and well-nourished  No distress  HENT:   Head: Normocephalic and atraumatic  Eyes: EOM and lids are normal  Right conjunctiva is not injected  Right conjunctiva has no hemorrhage  Left conjunctiva is injected  Left conjunctiva has no hemorrhage  No signs of cristian-orbital cellulitis  Neurological: She is alert and oriented to person, place, and time  Skin: She is not diaphoretic  Psychiatric: She has a normal mood and affect  Her behavior is normal  Judgment and thought content normal    Nursing note and vitals reviewed

## 2020-08-25 ENCOUNTER — OFFICE VISIT (OUTPATIENT)
Dept: FAMILY MEDICINE CLINIC | Facility: CLINIC | Age: 40
End: 2020-08-25
Payer: COMMERCIAL

## 2020-08-25 VITALS
WEIGHT: 146.6 LBS | SYSTOLIC BLOOD PRESSURE: 128 MMHG | BODY MASS INDEX: 24.43 KG/M2 | OXYGEN SATURATION: 95 % | HEART RATE: 74 BPM | HEIGHT: 65 IN | RESPIRATION RATE: 16 BRPM | TEMPERATURE: 98 F | DIASTOLIC BLOOD PRESSURE: 70 MMHG

## 2020-08-25 DIAGNOSIS — H00.014 HORDEOLUM EXTERNUM LEFT UPPER EYELID: Primary | ICD-10-CM

## 2020-08-25 PROCEDURE — 99213 OFFICE O/P EST LOW 20 MIN: CPT | Performed by: FAMILY MEDICINE

## 2020-08-25 PROCEDURE — 3008F BODY MASS INDEX DOCD: CPT | Performed by: FAMILY MEDICINE

## 2020-08-25 PROCEDURE — 1036F TOBACCO NON-USER: CPT | Performed by: FAMILY MEDICINE

## 2020-08-25 RX ORDER — ERYTHROMYCIN 5 MG/G
0.5 OINTMENT OPHTHALMIC EVERY 12 HOURS SCHEDULED
Qty: 3.5 G | Refills: 0 | Status: SHIPPED | OUTPATIENT
Start: 2020-08-25 | End: 2020-08-30

## 2020-08-25 NOTE — PROGRESS NOTES
Assessment/Plan:    No problem-specific Assessment & Plan notes found for this encounter  Diagnoses and all orders for this visit:    Hordeolum externum left upper eyelid  Comments:  Pt stable on exam - site is healing at this time  Will treat with Erythromycin Ophth ointment x 5 days as precaution, warm compresses  Orders:  -     erythromycin (ILOTYCIN) ophthalmic ointment; Administer 0 5 inches into the left eye every 12 (twelve) hours for 5 days          Subjective:      Patient ID: Maryellen Bateman is a 41 y o  female  Pain and "a pimple" on the edge of the left upper eyelid laterally x 2 days   tried to pop it (we discussed)  No fevers  No vision changes  The following portions of the patient's history were reviewed and updated as appropriate: allergies, current medications, past family history, past social history, past surgical history and problem list     Past Medical History:   Diagnosis Date    Hydronephrosis     Migraine     PONV (postoperative nausea and vomiting)     TMJ arthralgia     Resolved 10/29/2015     Ureteral stricture      Current Outpatient Medications   Medication Instructions    acetaminophen (TYLENOL) 500 mg, Oral, Every 6 hours PRN    erythromycin (ILOTYCIN) ophthalmic ointment 0 5 inches, Left Eye, Every 12 hours scheduled    fluticasone (FLONASE) 50 mcg/act nasal spray 1 spray, Nasal, Daily    ibuprofen (MOTRIN) 400 mg, Oral, Every 6 hours PRN     Allergies   Allergen Reactions    Shellfish-Derived Products Anaphylaxis     THROAT ITCHY MIGRAINES    Fentanyl Itching     Social History     Tobacco Use    Smoking status: Former Smoker     Packs/day: 0 20     Types: Cigarettes     Last attempt to quit:      Years since quittin 6    Smokeless tobacco: Never Used   Substance Use Topics    Alcohol use:  Yes     Alcohol/week: 2 0 standard drinks     Types: 2 Glasses of wine per week    Drug use: No         Review of Systems   Constitutional: Negative for activity change and fever  Eyes: Positive for redness  Negative for visual disturbance  Objective:      /70   Pulse 74   Temp 98 °F (36 7 °C)   Resp 16   Ht 5' 5" (1 651 m)   Wt 66 5 kg (146 lb 9 6 oz)   SpO2 95%   BMI 24 40 kg/m²          Physical Exam  Vitals signs and nursing note reviewed  Constitutional:       General: She is not in acute distress  Appearance: Normal appearance  She is not ill-appearing, toxic-appearing or diaphoretic  HENT:      Head: Normocephalic and atraumatic  Eyes:      General: No scleral icterus  Right eye: No discharge  Left eye: No discharge  Extraocular Movements: Extraocular movements intact  Conjunctiva/sclera: Conjunctivae normal      Neurological:      Mental Status: She is alert and oriented to person, place, and time  Psychiatric:         Mood and Affect: Mood normal          Behavior: Behavior normal          Thought Content:  Thought content normal          Judgment: Judgment normal

## 2020-09-11 ENCOUNTER — TELEPHONE (OUTPATIENT)
Dept: GASTROENTEROLOGY | Facility: AMBULARY SURGERY CENTER | Age: 40
End: 2020-09-11

## 2020-10-23 ENCOUNTER — OFFICE VISIT (OUTPATIENT)
Dept: FAMILY MEDICINE CLINIC | Facility: CLINIC | Age: 40
End: 2020-10-23
Payer: COMMERCIAL

## 2020-10-23 VITALS
HEIGHT: 67 IN | WEIGHT: 141.4 LBS | RESPIRATION RATE: 14 BRPM | HEART RATE: 83 BPM | TEMPERATURE: 97.9 F | DIASTOLIC BLOOD PRESSURE: 70 MMHG | SYSTOLIC BLOOD PRESSURE: 112 MMHG | BODY MASS INDEX: 22.19 KG/M2 | OXYGEN SATURATION: 98 %

## 2020-10-23 DIAGNOSIS — Z00.00 ANNUAL PHYSICAL EXAM: Primary | ICD-10-CM

## 2020-10-23 DIAGNOSIS — Z13.1 SCREENING FOR DIABETES MELLITUS: ICD-10-CM

## 2020-10-23 DIAGNOSIS — Z01.419 VISIT FOR GYNECOLOGIC EXAMINATION: ICD-10-CM

## 2020-10-23 DIAGNOSIS — Z12.11 SCREENING FOR COLORECTAL CANCER: ICD-10-CM

## 2020-10-23 DIAGNOSIS — Z12.31 ENCOUNTER FOR SCREENING MAMMOGRAM FOR BREAST CANCER: ICD-10-CM

## 2020-10-23 DIAGNOSIS — Z13.6 SCREENING FOR CARDIOVASCULAR CONDITION: ICD-10-CM

## 2020-10-23 DIAGNOSIS — Z80.0 FAMILY HISTORY OF COLON CANCER: ICD-10-CM

## 2020-10-23 DIAGNOSIS — Z12.12 SCREENING FOR COLORECTAL CANCER: ICD-10-CM

## 2020-10-23 PROCEDURE — 99396 PREV VISIT EST AGE 40-64: CPT | Performed by: FAMILY MEDICINE

## 2020-10-31 ENCOUNTER — OFFICE VISIT (OUTPATIENT)
Dept: URGENT CARE | Age: 40
End: 2020-10-31
Payer: COMMERCIAL

## 2020-10-31 ENCOUNTER — APPOINTMENT (OUTPATIENT)
Dept: RADIOLOGY | Age: 40
End: 2020-10-31
Payer: COMMERCIAL

## 2020-10-31 VITALS
TEMPERATURE: 97.3 F | DIASTOLIC BLOOD PRESSURE: 71 MMHG | RESPIRATION RATE: 18 BRPM | OXYGEN SATURATION: 98 % | SYSTOLIC BLOOD PRESSURE: 112 MMHG

## 2020-10-31 DIAGNOSIS — R07.81 RIB PAIN ON LEFT SIDE: ICD-10-CM

## 2020-10-31 DIAGNOSIS — R07.81 RIB PAIN ON LEFT SIDE: Primary | ICD-10-CM

## 2020-10-31 PROCEDURE — 71101 X-RAY EXAM UNILAT RIBS/CHEST: CPT

## 2020-10-31 PROCEDURE — G0382 LEV 3 HOSP TYPE B ED VISIT: HCPCS | Performed by: PREVENTIVE MEDICINE

## 2020-12-22 ENCOUNTER — IMMUNIZATIONS (OUTPATIENT)
Dept: FAMILY MEDICINE CLINIC | Facility: HOSPITAL | Age: 40
End: 2020-12-22
Payer: COMMERCIAL

## 2020-12-22 ENCOUNTER — IMMUNIZATIONS (OUTPATIENT)
Dept: FAMILY MEDICINE CLINIC | Facility: HOSPITAL | Age: 40
End: 2020-12-22

## 2020-12-22 DIAGNOSIS — Z23 ENCOUNTER FOR IMMUNIZATION: ICD-10-CM

## 2020-12-22 PROCEDURE — 0001A SARS-COV-2 / COVID-19 MRNA VACCINE (PFIZER-BIONTECH) 30 MCG: CPT

## 2020-12-22 PROCEDURE — 91300 SARS-COV-2 / COVID-19 MRNA VACCINE (PFIZER-BIONTECH) 30 MCG: CPT

## 2021-01-11 ENCOUNTER — IMMUNIZATIONS (OUTPATIENT)
Dept: FAMILY MEDICINE CLINIC | Facility: HOSPITAL | Age: 41
End: 2021-01-11

## 2021-01-11 DIAGNOSIS — Z23 ENCOUNTER FOR IMMUNIZATION: ICD-10-CM

## 2021-01-11 PROCEDURE — 91300 SARS-COV-2 / COVID-19 MRNA VACCINE (PFIZER-BIONTECH) 30 MCG: CPT

## 2021-01-11 PROCEDURE — 0002A SARS-COV-2 / COVID-19 MRNA VACCINE (PFIZER-BIONTECH) 30 MCG: CPT

## 2021-02-24 ENCOUNTER — OFFICE VISIT (OUTPATIENT)
Dept: GASTROENTEROLOGY | Facility: AMBULARY SURGERY CENTER | Age: 41
End: 2021-02-24
Payer: COMMERCIAL

## 2021-02-24 VITALS
BODY MASS INDEX: 23.7 KG/M2 | WEIGHT: 151 LBS | SYSTOLIC BLOOD PRESSURE: 110 MMHG | HEIGHT: 67 IN | DIASTOLIC BLOOD PRESSURE: 76 MMHG

## 2021-02-24 DIAGNOSIS — Z80.0 FAMILY HISTORY OF COLON CANCER: ICD-10-CM

## 2021-02-24 DIAGNOSIS — Z12.12 SCREENING FOR COLORECTAL CANCER: ICD-10-CM

## 2021-02-24 DIAGNOSIS — Z12.11 SCREENING FOR COLORECTAL CANCER: ICD-10-CM

## 2021-02-24 PROCEDURE — 99244 OFF/OP CNSLTJ NEW/EST MOD 40: CPT | Performed by: INTERNAL MEDICINE

## 2021-02-24 NOTE — LETTER
February 25, 2021     Kody Baez, [facility] [address] [address]  [address] [address]    Patient: Kusum Schmidt   YOB: 1980   Date of Visit: 2/24/2021       Dear Dr Kristy Whitehead:    Thank you for referring Kusum Schmidt to me for evaluation  Below are my notes for this consultation  If you have questions, please do not hesitate to call me  I look forward to following your patient along with you  Sincerely,        Favio Tamayo MD        CC: No Recipients  Favio Tamayo MD  2/25/2021  4:56 PM  Sign when Signing Visit  Consultation - [address] [facility]  Kusum Schmidt 41 y o  female MRN: [id number]  Unit/Bed#:  Encounter: [id number]        Consults    ASSESSMENT/PLAN:       1  Colon cancer screening-high risk in setting of family history of colon cancer in first-degree relative  Patient also has personal history of colon polyps  She is overdue for colonoscopy at this time  No other alarm symptoms  -would recommend high risk screening colonoscopy at this time  Patient was explained about  the risks and benefits of the procedure  Risks including but not limited to bleeding, infection, perforation were explained in detail  Also explained about less than 100% sensitivity with the exam and other alternatives   -discussed with patient that even if there were no polyps on this colonoscopy, would recommend repeat colonoscopy at 5 year interval   -patient is not on any antiplatelets or anticoagulants   -prep instructions given     ______________________________________________________________________    Reason for Consult / Principal Problem: [unfilled]    HPI: Kusum Schmidt is a 41y o  year old female with family history of colon cancer in her brother at age of 48 presents for colon cancer screening evaluation  Patient reports that she last underwent colonoscopy almost 8 years ago  Patient reports having had polyps at that time    She denies any change in bowel habits, hematochezia, abdominal pain, unintentional weight loss or melena  Patient denies any upper GI symptoms including acid reflux, dysphagia, odynophagia, loss of appetite or early satiety  Patient is not on any antiplatelets or anticoagulants  Labs are reviewed and are notable for hemoglobin of 10, platelets 084, normal LFTs, normal renal function with creatinine of 0 75  Review of Systems: The remainder of the review of systems was negative except for the pertinent positives noted in HPI  Historical Information   Past Medical History:   Diagnosis Date    Hydronephrosis     Migraine     PONV (postoperative nausea and vomiting)     TMJ arthralgia     Resolved 10/29/2015     Ureteral stricture      Past Surgical History:   Procedure Laterality Date    APPENDECTOMY      CYSTOSCOPY W/ LASER LITHOTRIPSY Right 11/2/2017    Procedure: CYSTOSCOPY;  RIGHT URETEROSCOPY WITH  HOLMIUM LASER INCISION OF URETERAL STRICTURE; (WITH HYDROSTATIC RIGHT URETERAL DILATION), BILATERAL RETROGRADE PYELOGRAM AND INSERTION OF RIGHT URETERAL STENT X 2 (4 7 X 26);   Surgeon: Warren Tejeda MD;  [facility];  Service: Urology    DILATION AND CURETTAGE OF UTERUS N/A 12/30/2016    Procedure: DILATATION AND CURETTAGE;  Surgeon: Jimmy Capellan MD;  [facility];  Service:    Andre Hoops DILATION AND EVACUATION      HYSTERECTOMY N/A 5/22/2017    Procedure: EXAM UNDER ANESTHESIA;  Surgeon: Jimmy Capellan MD;  [facility];  Service:     HYSTEROSCOPY N/A 12/30/2016    Procedure: HYSTEROSCOPY;  Surgeon: iJmmy Capellan MD;  [facility];  Service:    Andre Hoops [place] CYSTOURETHROSCOPY,URETER CATHETER Bilateral 3/27/2017    Procedure: CYSTOSCOPY WITH RETROGRADE PYELOGRAM; RIGHT STENT INSERTION;  Surgeon: Warren Tejeda MD;  [facility];  Service: Urology    [place] CYSTOURETHROSCOPY,URETER CATHETER N/A 1/11/2018    Procedure: CYSTOSCOPY , BILATERAL RETROGRADE PYELOGRAM WITH RIGHT STENT EXTRACTION; Surgeon: Beck Brower MD;  [facility];  Service: Urology    [place] CYSTOURETHROSCOPY,URETER CATHETER Right 1/15/2018    Procedure: CYSTOSCOPY, RIGHT RETROGRADE PYELOGRAM WITH INSERTION OF RIGHT STENT URETERAL;  Surgeon: Beck Brower MD;  [facility];  Service: Urology    [place] LAPAROSCOPY W TOT HYSTERECT UTERUS 250 GRAM OR LESS N/A 3/24/2017    Procedure: TOTAL LAPAROSCOPIC HYSTERECTOMY   bilateral salpingectomy, cysto; Surgeon: Petty Goodman MD;  [facility];  Service: Gynecology    REIMPLANT URETER IN BLADDER Right 2018    Procedure: URETERAL NEOCYSTOSTOMY, EXCISION OF URETERAL STRICTURE ;  Surgeon: Beck Brower MD;  [facility];  Service: Urology    URETERAL STENT PLACEMENT Right 2018    Procedure: INSERTION STENT URETERAL;  Surgeon: Beck Brower MD;  [facility];  Service: Urology     Social History   Social History     Substance and Sexual Activity   Alcohol Use Yes    Alcohol/week: 2 0 standard drinks    Types: 2 Glasses of wine per week     Social History     Substance and Sexual Activity   Drug Use No     Social History     Tobacco Use   Smoking Status Former Smoker    Packs/day: 0 20    Types: Cigarettes    Quit date:     Years since quittin 1   Smokeless Tobacco Never Used     Family History   Problem Relation Age of Onset    Heart attack Mother     Hypertension Mother     Colon cancer Brother     Diabetes type II Paternal Grandfather        Meds/Allergies     (Not in a hospital admission)    No current facility-administered medications for this visit  Allergies   Allergen Reactions    Shellfish-Derived Products Anaphylaxis     THROAT ITCHY MIGRAINES    Fentanyl Itching       Objective     Blood pressure 110/76, height 5' 6 73" (1 695 m), weight 68 5 kg (151 lb)      [unfilled]    PHYSICAL EXAM     GEN: well nourished, well developed, no acute distress  HEENT: anicteric, MMM, no cervical or supraclavicular lymphadenopathy  CV: RRR, no m/r/g  CHEST: CTA b/l, no WRR  ABD: +BS, soft, NT/ND, no hepatosplenomegaly  EXT: no c/c/e  SKIN: no rashes,  NEURO: aaox3    Lab Results:   No visits with results within 1 Day(s) from this visit     Latest known visit with results is:   Appointment on 04/27/2019   Component Date Value    Varicella IgG 04/27/2019 IMMUNE     Rubella IgG Quant 04/27/2019 127 2     Rubeola IgG 04/27/2019 IMMUNE     Mumps IgG 04/27/2019 IMMUNE     QFT Nil 04/27/2019 0 02     QFT TB1-NIL 04/27/2019 0 00     QFT TB2-NIL 04/27/2019 -0 01     QFT Mitogen-NIL 04/27/2019 >10 00     QFT Final Interpretation 04/27/2019 Negative     Hep B S Ab 04/27/2019 <3 10      Imaging Studies: I have personally reviewed pertinent films in PACS

## 2021-02-25 NOTE — PROGRESS NOTES
Consultation - [facility]) [facility]  Cande Smiley 41 y o  female MRN: [id number]  Unit/Bed#:  Encounter: [id number]        Consults    ASSESSMENT/PLAN:       1  Colon cancer screening-high risk in setting of family history of colon cancer in first-degree relative  Patient also has personal history of colon polyps  She is overdue for colonoscopy at this time  No other alarm symptoms  -would recommend high risk screening colonoscopy at this time  Patient was explained about  the risks and benefits of the procedure  Risks including but not limited to bleeding, infection, perforation were explained in detail  Also explained about less than 100% sensitivity with the exam and other alternatives   -discussed with patient that even if there were no polyps on this colonoscopy, would recommend repeat colonoscopy at 5 year interval   -patient is not on any antiplatelets or anticoagulants   -prep instructions given     ______________________________________________________________________    Reason for Consult / Principal Problem: [unfilled]    HPI: Cande Smiley is a 41y o  year old female with family history of colon cancer in her brother at age of 48 presents for colon cancer screening evaluation  Patient reports that she last underwent colonoscopy almost 8 years ago  Patient reports having had polyps at that time  She denies any change in bowel habits, hematochezia, abdominal pain, unintentional weight loss or melena  Patient denies any upper GI symptoms including acid reflux, dysphagia, odynophagia, loss of appetite or early satiety  Patient is not on any antiplatelets or anticoagulants  Labs are reviewed and are notable for hemoglobin of 10, platelets 474, normal LFTs, normal renal function with creatinine of 0 75  Review of Systems: The remainder of the review of systems was negative except for the pertinent positives noted in HPI       Historical Information   Past Medical History:   Diagnosis Date    Hydronephrosis     Migraine     PONV (postoperative nausea and vomiting)     TMJ arthralgia     Resolved 10/29/2015     Ureteral stricture      Past Surgical History:   Procedure Laterality Date    APPENDECTOMY      CYSTOSCOPY W/ LASER LITHOTRIPSY Right 11/2/2017    Procedure: CYSTOSCOPY;  RIGHT URETEROSCOPY WITH  HOLMIUM LASER INCISION OF URETERAL STRICTURE; (WITH HYDROSTATIC RIGHT URETERAL DILATION), BILATERAL RETROGRADE PYELOGRAM AND INSERTION OF RIGHT URETERAL STENT X 2 (4 7 X 26); Surgeon: Mireille Dixon MD;  [facility];  Service: Urology    DILATION AND CURETTAGE OF UTERUS N/A 12/30/2016    Procedure: DILATATION AND CURETTAGE;  Surgeon: Clive Gonsales MD;  [facility];  Service:    Normie Glory DILATION AND EVACUATION      HYSTERECTOMY N/A 5/22/2017    Procedure: EXAM UNDER ANESTHESIA;  Surgeon: Clive Gonsales MD;  [facility];  Service:     HYSTEROSCOPY N/A 12/30/2016    Procedure: HYSTEROSCOPY;  Surgeon: Clive Gonsales MD;  [facility];  Service:    Normie Glory MA CYSTOURETHROSCOPY,URETER CATHETER Bilateral 3/27/2017    Procedure: CYSTOSCOPY WITH RETROGRADE PYELOGRAM; RIGHT STENT INSERTION;  Surgeon: Mirielle Dixon MD;  [facility];  Service: Urology    MA CYSTOURETHROSCOPY,URETER CATHETER N/A 1/11/2018    Procedure: CYSTOSCOPY , BILATERAL RETROGRADE PYELOGRAM WITH RIGHT STENT EXTRACTION;  Surgeon: Mireille Dixon MD;  [facility];  Service: Urology    MA CYSTOURETHROSCOPY,URETER CATHETER Right 1/15/2018    Procedure: CYSTOSCOPY, RIGHT RETROGRADE PYELOGRAM WITH INSERTION OF RIGHT STENT URETERAL;  Surgeon: Mireille Dixon MD;  [facility];  Service: Urology    MA LAPAROSCOPY W TOT HYSTERECT UTERUS 250 GRAM OR LESS N/A 3/24/2017    Procedure: TOTAL LAPAROSCOPIC HYSTERECTOMY   bilateral salpingectomy, cysto;   Surgeon: Clive Gonsales MD;  [facility];  Service: Gynecology    REIMPLANT URETER IN BLADDER Right 2018    Procedure: URETERAL NEOCYSTOSTOMY, EXCISION OF URETERAL STRICTURE ;  Surgeon: Mary Jeronimo MD;  [facility];  Service: Urology    URETERAL STENT PLACEMENT Right 2018    Procedure: INSERTION STENT URETERAL;  Surgeon: Mary Jeronimo MD;  [facility];  Service: Urology     Social History   Social History     Substance and Sexual Activity   Alcohol Use Yes    Alcohol/week: 2 0 standard drinks    Types: 2 Glasses of wine per week     Social History     Substance and Sexual Activity   Drug Use No     Social History     Tobacco Use   Smoking Status Former Smoker    Packs/day: 0 20    Types: Cigarettes    Quit date:     Years since quittin 1   Smokeless Tobacco Never Used     Family History   Problem Relation Age of Onset    Heart attack Mother     Hypertension Mother     Colon cancer Brother     Diabetes type II Paternal Grandfather        Meds/Allergies     (Not in a hospital admission)    No current facility-administered medications for this visit  Allergies   Allergen Reactions    Shellfish-Derived Products Anaphylaxis     THROAT ITCHY MIGRAINES    Fentanyl Itching       Objective     Blood pressure 110/76, height 5' 6 73" (1 695 m), weight 68 5 kg (151 lb)  [unfilled]    PHYSICAL EXAM     GEN: well nourished, well developed, no acute distress  HEENT: anicteric, MMM, no cervical or supraclavicular lymphadenopathy  CV: RRR, no m/r/g  CHEST: CTA b/l, no WRR  ABD: +BS, soft, NT/ND, no hepatosplenomegaly  EXT: no c/c/e  SKIN: no rashes,  NEURO: aaox3    Lab Results:   No visits with results within 1 Day(s) from this visit     Latest known visit with results is:   Appointment on 2019   Component Date Value    Varicella IgG 2019 IMMUNE     Rubella IgG Quant 2019 127 2     Rubeola IgG 2019 IMMUNE     Mumps IgG 2019 IMMUNE     QFT Nil 2019 0 02     QFT TB1-NIL 2019 0 00     QFT TB2-NIL 2019 -0 01     QFT Mitogen-NIL 04/27/2019 >10 00     QFT Final Interpretation 04/27/2019 Negative     Hep B S Ab 04/27/2019 <3 10      Imaging Studies: I have personally reviewed pertinent films in PACS

## 2021-04-13 ENCOUNTER — OFFICE VISIT (OUTPATIENT)
Dept: FAMILY MEDICINE CLINIC | Facility: CLINIC | Age: 41
End: 2021-04-13
Payer: COMMERCIAL

## 2021-04-13 VITALS
WEIGHT: 144.8 LBS | RESPIRATION RATE: 16 BRPM | OXYGEN SATURATION: 97 % | TEMPERATURE: 97.6 F | DIASTOLIC BLOOD PRESSURE: 70 MMHG | SYSTOLIC BLOOD PRESSURE: 102 MMHG | HEART RATE: 77 BPM | BODY MASS INDEX: 22.86 KG/M2

## 2021-04-13 DIAGNOSIS — R42 VERTIGO: ICD-10-CM

## 2021-04-13 DIAGNOSIS — H61.23 BILATERAL IMPACTED CERUMEN: Primary | ICD-10-CM

## 2021-04-13 PROCEDURE — 99213 OFFICE O/P EST LOW 20 MIN: CPT | Performed by: FAMILY MEDICINE

## 2021-04-13 RX ORDER — MECLIZINE HYDROCHLORIDE 25 MG/1
25 TABLET ORAL EVERY 8 HOURS PRN
Qty: 30 TABLET | Refills: 0 | Status: SHIPPED | OUTPATIENT
Start: 2021-04-13 | End: 2022-03-24

## 2021-04-13 NOTE — PROGRESS NOTES
FAMILY PRACTICE OFFICE VISIT       NAME: Génesis Villagran  AGE: 41 y o  SEX: female       : 1980        MRN: [id number]    DATE: 2021  TIME: 5:26 PM    Assessment and Plan   1  Bilateral impacted cerumen  Comments:  Pt stable  Discussed home treatments to do, to help loosen / clear cerumen  Pt will make f/u appt for ear lavage if no improvement  2  Vertigo  Comments:  Mild; possibly assoc with above  Pt to hydrate well; prescribed Meclizine for PRN usage  Orders:  -     meclizine (ANTIVERT) 25 mg tablet; Take 1 tablet (25 mg total) by mouth every 8 (eight) hours as needed for dizziness         There are no Patient Instructions on file for this visit  Chief Complaint     Chief Complaint   Patient presents with    Follow-up       History of Present Illness   Mookie Guevara is a 41y o -year-old female who presents today with the c/o 4 - 5 days of ear itching, dizziness, often worse with head movement  She had both COV-19 vaccines (Sebas Hennessy)  Pt with hx of hysterectomy  Review of Systems   Review of Systems   Constitutional: Negative for activity change and fever  HENT: Positive for congestion, ear pain and sneezing  Occasional right ear pain; mostly itching  Eyes: Negative for visual disturbance  Respiratory: Negative for cough and shortness of breath  Cardiovascular: Negative for chest pain  Neurological: Positive for dizziness  Negative for headaches         Active Problem List     Patient Active Problem List   Diagnosis    Vaginal injury    Ureteral stricture, right    CVA tenderness    Hydronephrosis    Preop examination    Screening for colorectal cancer    Family history of colon cancer         Past Medical History:  Past Medical History:   Diagnosis Date    Hydronephrosis     Migraine     PONV (postoperative nausea and vomiting)     TMJ arthralgia     Resolved 10/29/2015     Ureteral stricture        Past Surgical History:  Past Surgical History: Procedure Laterality Date    APPENDECTOMY      CYSTOSCOPY W/ LASER LITHOTRIPSY Right 11/2/2017    Procedure: CYSTOSCOPY;  RIGHT URETEROSCOPY WITH  HOLMIUM LASER INCISION OF URETERAL STRICTURE; (WITH HYDROSTATIC RIGHT URETERAL DILATION), BILATERAL RETROGRADE PYELOGRAM AND INSERTION OF RIGHT URETERAL STENT X 2 (4 7 X 26); Surgeon: Xin Wade MD;  [facility];  Service: Urology    DILATION AND CURETTAGE OF UTERUS N/A 12/30/2016    Procedure: DILATATION AND CURETTAGE;  Surgeon: Jaycee Donnelly MD;  [facility];  Service:    Vibha Leisure DILATION AND EVACUATION      HYSTERECTOMY N/A 5/22/2017    Procedure: EXAM UNDER ANESTHESIA;  Surgeon: Jaycee Donnelly MD;  [facility];  Service:     HYSTEROSCOPY N/A 12/30/2016    Procedure: HYSTEROSCOPY;  Surgeon: Jaycee Donnelly MD;  [facility];  Service:    Vibha Leisure NM CYSTOURETHROSCOPY,URETER CATHETER Bilateral 3/27/2017    Procedure: CYSTOSCOPY WITH RETROGRADE PYELOGRAM; RIGHT STENT INSERTION;  Surgeon: Xin Wade MD;  [facility];  Service: Urology    NM CYSTOURETHROSCOPY,URETER CATHETER N/A 1/11/2018    Procedure: CYSTOSCOPY , BILATERAL RETROGRADE PYELOGRAM WITH RIGHT STENT EXTRACTION;  Surgeon: Xin Wade MD;  [facility];  Service: Urology    NM CYSTOURETHROSCOPY,URETER CATHETER Right 1/15/2018    Procedure: CYSTOSCOPY, RIGHT RETROGRADE PYELOGRAM WITH INSERTION OF RIGHT STENT URETERAL;  Surgeon: Xin Wade MD;  [facility];  Service: Urology    NM LAPAROSCOPY W TOT HYSTERECT UTERUS 250 GRAM OR LESS N/A 3/24/2017    Procedure: TOTAL LAPAROSCOPIC HYSTERECTOMY   bilateral salpingectomy, cysto;   Surgeon: Jyacee Donnelly MD;  [facility];  Service: Gynecology    REIMPLANT URETER IN BLADDER Right 2/22/2018    Procedure: URETERAL NEOCYSTOSTOMY, EXCISION OF URETERAL STRICTURE ;  Surgeon: Xin Wade MD;  [facility];  Service: Urology    URETERAL STENT PLACEMENT Right 2/22/2018 Procedure: INSERTION STENT URETERAL;  Surgeon: Jeronimo Gong MD;  [facility];  Service: Urology       Family History:  Family History   Problem Relation Age of Onset    Heart attack Mother     Hypertension Mother     Colon cancer Brother     Diabetes type II Paternal Grandfather        Social History:  Social History     Socioeconomic History    Marital status: /Civil Union     Spouse name: Not on file    Number of children: Not on file    Years of education: Not on file    Highest education level: Not on file   Occupational History    Occupation:    Social Needs    Financial resource strain: Not on file    Food insecurity     Worry: Not on file     Inability: Not on file   [employer] needs     Medical: Not on file     Non-medical: Not on file   Tobacco Use    Smoking status: Former Smoker     Packs/day: 0 20     Types: Cigarettes     Quit date:      Years since quittin 2    Smokeless tobacco: Never Used   Substance and Sexual Activity    Alcohol use:  Yes     Alcohol/week: 2 0 standard drinks     Types: 2 Glasses of wine per week    Drug use: No    Sexual activity: Not on file   Lifestyle    Physical activity     Days per week: Not on file     Minutes per session: Not on file    Stress: Not on file   Relationships    Social connections     Talks on phone: Not on file     Gets together: Not on file     Attends Religion service: Not on file     Active member of club or organization: Not on file     Attends meetings of clubs or organizations: Not on file     Relationship status: Not on file    Intimate partner violence     Fear of current or ex partner: Not on file     Emotionally abused: Not on file     Physically abused: Not on file     Forced sexual activity: Not on file   Other Topics Concern    Not on file   Social History Narrative    Not on file       Objective     Vitals:    21 1352   BP: 102/70   Pulse: 77   Resp: 16   Temp: 97 6 °F (36 4 °C)   SpO2: 97%     Wt Readings from Last 3 Encounters:   04/13/21 65 7 kg (144 lb 12 8 oz)   02/24/21 68 5 kg (151 lb)   10/23/20 64 1 kg (141 lb 6 4 oz)       Physical Exam  Vitals signs and nursing note reviewed  Constitutional:       General: She is not in acute distress  Appearance: Normal appearance  She is not ill-appearing, toxic-appearing or diaphoretic  HENT:      Head: Normocephalic and atraumatic  Right Ear: External ear normal  There is impacted cerumen  Left Ear: External ear normal  There is impacted cerumen  Eyes:      General: No scleral icterus  Extraocular Movements: Extraocular movements intact  Conjunctiva/sclera: Conjunctivae normal       Pupils: Pupils are equal, round, and reactive to light  Neurological:      Mental Status: She is alert and oriented to person, place, and time  Psychiatric:         Mood and Affect: Mood normal          Behavior: Behavior normal          Thought Content:  Thought content normal          Judgment: Judgment normal          Pertinent Laboratory/Diagnostic Studies:  Lab Results   Component Value Date    GLUCOSE 81 10/03/2014    BUN 6 03/22/2018    CREATININE 0 75 03/22/2018    CALCIUM 8 7 03/22/2018     10/03/2014    K 3 6 03/22/2018    CO2 22 03/22/2018     03/22/2018     Lab Results   Component Value Date    ALT 13 03/22/2018    AST 13 03/22/2018    ALKPHOS 61 03/22/2018    BILITOT 0 58 10/03/2014       Lab Results   Component Value Date    WBC 7 53 03/22/2018    HGB 10 0 (L) 03/22/2018    HCT 30 8 (L) 03/22/2018    MCV 83 03/22/2018     03/22/2018       No results found for: TSH    Lab Results   Component Value Date    CHOL 168 10/03/2014     Lab Results   Component Value Date    TRIG <30 10/03/2014     Lab Results   Component Value Date    HDL 91 10/03/2014     Lab Results   Component Value Date    LDLCALC Cannot Calculat 10/03/2014     No results found for: HGBA1C    Results for orders placed or performed in visit on 04/27/19   Varicella zoster antibody, IgG   Result Value Ref Range    Varicella IgG IMMUNE IMMUNE   Rubella antibody, IgG   Result Value Ref Range    Rubella IgG Quant 127 2 >9 9 IU/mL   Rubeola antibody IgG   Result Value Ref Range    Rubeola IgG IMMUNE IMMUNE   Mumps antibody, IgG   Result Value Ref Range    Mumps IgG IMMUNE IMMUNE   Quantiferon TB Gold Plus   Result Value Ref Range    QFT Nil 0 02 0 - 8 0 IU/ml    QFT TB1-NIL 0 00 IU/ml    QFT TB2-NIL -0 01 IU/ml    QFT Mitogen-NIL >10 00 IU/ml    QFT Final Interpretation Negative Negative   Hepatitis B surface antibody   Result Value Ref Range    Hep B S Ab <3 10 mIU/mL       No orders of the defined types were placed in this encounter        ALLERGIES:  Allergies   Allergen Reactions    Shellfish-Derived Products - Food Allergy Anaphylaxis     THROAT ITCHY MIGRAINES    Fentanyl Itching       Current Medications     Current Outpatient Medications   Medication Sig Dispense Refill    Calcium Polycarbophil (FIBER-CAPS PO) Take by mouth      Omega-3 Fatty Acids (OMEGA-3 FISH OIL PO) Take by mouth      acetaminophen (TYLENOL) 500 mg tablet Take 1 tablet (500 mg total) by mouth every 6 (six) hours as needed for mild pain, moderate pain, headaches or fever (Patient not taking: Reported on 1/9/2020) 30 tablet 0    fluticasone (FLONASE) 50 mcg/act nasal spray 1 spray into each nostril daily (Patient not taking: Reported on 1/9/2020) 1 Bottle 0    ibuprofen (MOTRIN) 400 mg tablet Take 1 tablet (400 mg total) by mouth every 6 (six) hours as needed for mild pain (Patient not taking: Reported on 1/9/2020) 30 tablet 0    meclizine (ANTIVERT) 25 mg tablet Take 1 tablet (25 mg total) by mouth every 8 (eight) hours as needed for dizziness 30 tablet 0    Na Sulfate-K Sulfate-Mg Sulf 17 5-3 13-1 6 UU/235IH SOLN Take 1 applicator by mouth once for 1 dose (Patient not taking: Reported on 4/13/2021) 1 Bottle 0     No current facility-administered medications for this visit            Health Maintenance     Health Maintenance   Topic Date Due    MAMMOGRAM  Never done    Cervical Cancer Screening  Never done    Depression Screening PHQ  01/09/2021    HIV Screening  04/14/2023 (Originally 8/20/1993)    DTaP,Tdap,and Td Vaccines (2 - Td) 07/01/2021    Annual Physical  10/23/2021    BMI: Adult  04/13/2022    Influenza Vaccine  Completed    COVID-19 Vaccine  Completed    Pneumococcal Vaccine: Pediatrics (0 to 5 Years) and At-Risk Patients (6 to 59 Years)  Aged Out    HIB Vaccine  Aged Out    Hepatitis B Vaccine  Aged Out    IPV Vaccine  Aged Out    Hepatitis A Vaccine  Aged Out    Meningococcal ACWY Vaccine  Aged Out    HPV Vaccine  Aged [employer] History   Administered Date(s) Administered    INFLUENZA 10/01/2019, 10/21/2020    SARS-CoV-2 / COVID-19 mRNA IM (Scaled Inference) 12/22/2020, 01/11/2021    Tdap 07/01/2011          Bret De Jesus DO

## 2021-04-22 ENCOUNTER — TELEPHONE (OUTPATIENT)
Dept: FAMILY MEDICINE CLINIC | Facility: CLINIC | Age: 41
End: 2021-04-22

## 2021-04-22 NOTE — TELEPHONE ENCOUNTER
Patient called and stated that her ears are still bothering her and that you said for her to come in to have them flushed, you only have same days available next week the first available is 5/5 and she doesn't want to wait that long   Please advise

## 2021-05-04 ENCOUNTER — OFFICE VISIT (OUTPATIENT)
Dept: FAMILY MEDICINE CLINIC | Facility: CLINIC | Age: 41
End: 2021-05-04
Payer: COMMERCIAL

## 2021-05-04 VITALS
WEIGHT: 143 LBS | BODY MASS INDEX: 22.58 KG/M2 | RESPIRATION RATE: 16 BRPM | SYSTOLIC BLOOD PRESSURE: 120 MMHG | OXYGEN SATURATION: 97 % | HEART RATE: 73 BPM | DIASTOLIC BLOOD PRESSURE: 62 MMHG | TEMPERATURE: 97.2 F

## 2021-05-04 DIAGNOSIS — H61.23 BILATERAL IMPACTED CERUMEN: Primary | ICD-10-CM

## 2021-05-04 PROCEDURE — 69210 REMOVE IMPACTED EAR WAX UNI: CPT | Performed by: FAMILY MEDICINE

## 2021-05-04 PROCEDURE — 99213 OFFICE O/P EST LOW 20 MIN: CPT | Performed by: FAMILY MEDICINE

## 2021-05-04 NOTE — PROGRESS NOTES
FAMILY PRACTICE OFFICE VISIT       NAME: Génesis Villagran  AGE: 41 y o  SEX: female       : 1980        MRN: [id number]    DATE: 2021  TIME: 1:49 PM    Assessment and Plan   1  Bilateral impacted cerumen  Comments:  Génesis tolerated the procedure well today, with no immediate complications  Her hearing was much improved  Orders:  -     Ear cerumen removal         There are no Patient Instructions on file for this visit  Chief Complaint     Chief Complaint   Patient presents with    Earache       History of Present Illness   Jessica Blanchard is a 41y o -year-old female who presents for bilateral ear lavage - pt was found to have bilateral cerumen impaction at her last OV 21  Review of Systems   Review of Systems   Constitutional: Negative for activity change and fever  HENT: Positive for hearing loss  Active Problem List     Patient Active Problem List   Diagnosis    Vaginal injury    Ureteral stricture, right    CVA tenderness    Hydronephrosis    Preop examination    Screening for colorectal cancer    Family history of colon cancer         Past Medical History:  Past Medical History:   Diagnosis Date    Hydronephrosis     Migraine     PONV (postoperative nausea and vomiting)     TMJ arthralgia     Resolved 10/29/2015     Ureteral stricture        Past Surgical History:  Past Surgical History:   Procedure Laterality Date    APPENDECTOMY      CYSTOSCOPY W/ LASER LITHOTRIPSY Right 2017    Procedure: CYSTOSCOPY;  RIGHT URETEROSCOPY WITH  HOLMIUM LASER INCISION OF URETERAL STRICTURE; (WITH HYDROSTATIC RIGHT URETERAL DILATION), BILATERAL RETROGRADE PYELOGRAM AND INSERTION OF RIGHT URETERAL STENT X 2 (4 7 X 26);   Surgeon: Marck Rivas MD;  [facility];  Service: Urology    DILATION AND CURETTAGE OF UTERUS N/A 2016    Procedure: DILATATION AND CURETTAGE;  Surgeon: Eleuterio Mary MD;  [facility];  Service:    [address] [place] [address] HYSTERECTOMY N/A 5/22/2017    Procedure: EXAM UNDER ANESTHESIA;  Surgeon: Jessika Boggs MD;  [facility];  Service:     HYSTEROSCOPY N/A 12/30/2016    Procedure: HYSTEROSCOPY;  Surgeon: Jessika Boggs MD;  [facility];  Service:    [place] CYSTOURETHROSCOPY,URETER CATHETER Bilateral 3/27/2017    Procedure: CYSTOSCOPY WITH RETROGRADE PYELOGRAM; RIGHT STENT INSERTION;  Surgeon: Analy Powers MD;  [facility];  Service: Urology    [place] CYSTOURETHROSCOPY,URETER CATHETER N/A 1/11/2018    Procedure: CYSTOSCOPY , BILATERAL RETROGRADE PYELOGRAM WITH RIGHT STENT EXTRACTION;  Surgeon: Analy Powers MD;  [facility];  Service: Urology    TX CYSTOURETHROSCOPY,URETER CATHETER Right 1/15/2018    Procedure: CYSTOSCOPY, RIGHT RETROGRADE PYELOGRAM WITH INSERTION OF RIGHT STENT URETERAL;  Surgeon: Analy Powers MD;  [facility];  Service: Urology    TX LAPAROSCOPY W TOT HYSTERECT UTERUS 250 GRAM OR LESS N/A 3/24/2017    Procedure: TOTAL LAPAROSCOPIC HYSTERECTOMY   bilateral salpingectomy, cysto;   Surgeon: Jessika Boggs MD;  [facility];  Service: Gynecology    REIMPLANT URETER IN BLADDER Right 2/22/2018    Procedure: URETERAL NEOCYSTOSTOMY, EXCISION OF URETERAL STRICTURE ;  Surgeon: Analy Powers MD;  [facility];  Service: Urology    URETERAL STENT PLACEMENT Right 2/22/2018    Procedure: INSERTION STENT URETERAL;  Surgeon: Analy Powers MD;  [facility];  Service: Urology       Family History:  Family History   Problem Relation Age of Onset    Heart attack Mother     Hypertension Mother     Colon cancer Brother     Diabetes type II Paternal Grandfather        Social History:  Social History     Socioeconomic History    Marital status: /Civil Union     Spouse name: Not on file    Number of children: Not on file    Years of education: Not on file    Highest education level: Not on file   Occupational History    Occupation: Office Additional Notes: Patient was prescribed by PCP a steroid cream that has now caused these “stretch marks” to appear on the inner thighs. Patient was advised today about using steroids on this area as well as other sensitive areas that could cause more of these marks to appear. Assistant   Social Needs    Financial resource strain: Not on file    Food insecurity     Worry: Not on file     Inability: Not on file    Transportation needs     Medical: Not on file     Non-medical: Not on file   Tobacco Use    Smoking status: Former Smoker     Packs/day: 0 20     Types: Cigarettes     Quit date:      Years since quittin 3    Smokeless tobacco: Never Used   Substance and Sexual Activity    Alcohol use: Yes     Alcohol/week: 2 0 standard drinks     Types: 2 Glasses of wine per week    Drug use: No    Sexual activity: Not on file   Lifestyle    Physical activity     Days per week: Not on file     Minutes per session: Not on file    Stress: Not on file   Relationships    Social connections     Talks on phone: Not on file     Gets together: Not on file     Attends [employer] service: Not on file     Active member of club or organization: Not on file     Attends meetings of clubs or organizations: Not on file     Relationship status: Not on file    Intimate partner violence     Fear of current or ex partner: Not on file     Emotionally abused: Not on file     Physically abused: Not on file     Forced sexual activity: Not on file   Other Topics Concern    Not on file   Social History Narrative    Not on file       Objective     Vitals:    21 1324   BP: 120/62   Pulse: 73   Resp: 16   Temp: (!) 97 2 °F (36 2 °C)   SpO2: 97%     Wt Readings from Last 3 Encounters:   21 64 9 kg (143 lb)   21 65 7 kg (144 lb 12 8 oz)   21 68 5 kg (151 lb)       Physical Exam  Vitals signs and nursing note reviewed  Constitutional:       General: She is not in acute distress  Appearance: Normal appearance  She is not ill-appearing, toxic-appearing or diaphoretic  HENT:      Head: Normocephalic and atraumatic  Right Ear: External ear normal  There is impacted cerumen  Left Ear: External ear normal  There is impacted cerumen        Ears:      Comments: Bilateral Detail Level: Zone TMs and ear canals clear after lavage today  Eyes:      General: No scleral icterus  Conjunctiva/sclera: Conjunctivae normal    Neurological:      Mental Status: She is alert and oriented to person, place, and time  Psychiatric:         Mood and Affect: Mood normal          Behavior: Behavior normal          Thought Content: Thought content normal          Judgment: Judgment normal      Ear cerumen removal    Date/Time: 5/4/2021 1:29 PM  Performed by: Kalyan Hall DO  Authorized by: Kalyan Hall DO   [facility] Protocol:  Consent: Verbal consent obtained  Consent given by: patient  Time out: Immediately prior to procedure a "time out" was called to verify the correct patient, procedure, equipment, support staff and site/side marked as required  Timeout called at: 5/4/2021 1:29 PM   Patient understanding: patient states understanding of the procedure being performed  Patient consent: the patient's understanding of the procedure matches consent given  Procedure consent: procedure consent matches procedure scheduled      Patient location:  Clinic  Indications / Diagnosis:  Bilateral cerumen impaction  Procedure details:     Location:  L ear and R ear (left ear worse than right)    Procedure type: irrigation with instrumentation      Instrumentation: curette      Approach:  External    Visualization (free text):  Otoscope    Equipment used:  Otoscope, 50 cc syringe with catheter, warm water with H2O2 added, curette  Post-procedure details:     Complication:  None    Hearing quality:  Normal    Patient tolerance of procedure:   Tolerated well, no immediate complications        Pertinent Laboratory/Diagnostic Studies:  Lab Results   Component Value Date    GLUCOSE 81 10/03/2014    BUN 6 03/22/2018    CREATININE 0 75 03/22/2018    CALCIUM 8 7 03/22/2018     10/03/2014    K 3 6 03/22/2018    CO2 22 03/22/2018     03/22/2018     Lab Results   Component Value Date    ALT 13 03/22/2018    AST 13 03/22/2018    ALKPHOS 61 03/22/2018    BILITOT 0 58 10/03/2014       Lab Results   Component Value Date    WBC 7 53 03/22/2018    HGB 10 0 (L) 03/22/2018    HCT 30 8 (L) 03/22/2018    MCV 83 03/22/2018     03/22/2018       No results found for: TSH    Lab Results   Component Value Date    CHOL 168 10/03/2014     Lab Results   Component Value Date    TRIG <30 10/03/2014     Lab Results   Component Value Date    HDL 91 10/03/2014     Lab Results   Component Value Date    LDLCALC Cannot Calculat 10/03/2014     No results found for: HGBA1C    Results for orders placed or performed in visit on 04/27/19   Varicella zoster antibody, IgG   Result Value Ref Range    Varicella IgG IMMUNE IMMUNE   Rubella antibody, IgG   Result Value Ref Range    Rubella IgG Quant 127 2 >9 9 IU/mL   Rubeola antibody IgG   Result Value Ref Range    Rubeola IgG IMMUNE IMMUNE   Mumps antibody, IgG   Result Value Ref Range    Mumps IgG IMMUNE IMMUNE   Quantiferon TB Gold Plus   Result Value Ref Range    QFT Nil 0 02 0 - 8 0 IU/ml    QFT TB1-NIL 0 00 IU/ml    QFT TB2-NIL -0 01 IU/ml    QFT Mitogen-NIL >10 00 IU/ml    QFT Final Interpretation Negative Negative   Hepatitis B surface antibody   Result Value Ref Range    Hep B S Ab <3 10 mIU/mL       Orders Placed This Encounter   Procedures    Ear cerumen removal       ALLERGIES:  Allergies   Allergen Reactions    Shellfish-Derived Products - Food Allergy Anaphylaxis     THROAT ITCHY MIGRAINES    Fentanyl Itching       Current Medications     Current Outpatient Medications   Medication Sig Dispense Refill    Calcium Polycarbophil (FIBER-CAPS PO) Take by mouth      meclizine (ANTIVERT) 25 mg tablet Take 1 tablet (25 mg total) by mouth every 8 (eight) hours as needed for dizziness 30 tablet 0    Omega-3 Fatty Acids (OMEGA-3 FISH OIL PO) Take by mouth      acetaminophen (TYLENOL) 500 mg tablet Take 1 tablet (500 mg total) by mouth every 6 (six) hours as needed for mild pain, moderate pain, headaches or fever (Patient not taking: Reported on 1/9/2020) 30 tablet 0    fluticasone (FLONASE) 50 mcg/act nasal spray 1 spray into each nostril daily (Patient not taking: Reported on 1/9/2020) 1 Bottle 0    ibuprofen (MOTRIN) 400 mg tablet Take 1 tablet (400 mg total) by mouth every 6 (six) hours as needed for mild pain (Patient not taking: Reported on 1/9/2020) 30 tablet 0    Na Sulfate-K Sulfate-Mg Sulf 17 5-3 13-1 6 RT/562HV SOLN Take 1 applicator by mouth once for 1 dose (Patient not taking: Reported on 4/13/2021) 1 Bottle 0     No current facility-administered medications for this visit            Health Maintenance     Health Maintenance   Topic Date Due    MAMMOGRAM  Never done    Cervical Cancer Screening  Never done    HIV Screening  04/14/2023 (Originally 8/20/1993)    DTaP,Tdap,and Td Vaccines (2 - Td) 07/01/2021    Annual Physical  10/23/2021    Depression Screening PHQ  04/13/2022    BMI: Adult  05/04/2022    Influenza Vaccine  Completed    COVID-19 Vaccine  Completed    Pneumococcal Vaccine: Pediatrics (0 to 5 Years) and At-Risk Patients (6 to 59 Years)  Aged Out    HIB Vaccine  Aged Out    Hepatitis B Vaccine  Aged Out    IPV Vaccine  Aged Out    Hepatitis A Vaccine  Aged Out    Meningococcal ACWY Vaccine  Aged Out    HPV Vaccine  Aged [employer] History   Administered Date(s) Administered    INFLUENZA 10/01/2019, 10/21/2020    SARS-CoV-2 / COVID-19 mRNA IM ([facility]) 12/22/2020, 01/11/2021    Tdap 07/01/2011            Bret De Jesus DO

## 2021-05-07 ENCOUNTER — ANESTHESIA EVENT (OUTPATIENT)
Dept: ANESTHESIOLOGY | Facility: HOSPITAL | Age: 41
End: 2021-05-07

## 2021-05-07 ENCOUNTER — ANESTHESIA (OUTPATIENT)
Dept: ANESTHESIOLOGY | Facility: HOSPITAL | Age: 41
End: 2021-05-07

## 2021-05-20 ENCOUNTER — ANESTHESIA EVENT (OUTPATIENT)
Dept: GASTROENTEROLOGY | Facility: AMBULARY SURGERY CENTER | Age: 41
End: 2021-05-20

## 2021-05-21 ENCOUNTER — HOSPITAL ENCOUNTER (OUTPATIENT)
Dept: GASTROENTEROLOGY | Facility: AMBULARY SURGERY CENTER | Age: 41
Setting detail: OUTPATIENT SURGERY
Discharge: HOME | End: 2021-05-21
Attending: INTERNAL MEDICINE | Admitting: INTERNAL MEDICINE
Payer: COMMERCIAL

## 2021-05-21 ENCOUNTER — ANESTHESIA (OUTPATIENT)
Dept: GASTROENTEROLOGY | Facility: AMBULARY SURGERY CENTER | Age: 41
End: 2021-05-21

## 2021-05-21 VITALS
SYSTOLIC BLOOD PRESSURE: 116 MMHG | WEIGHT: 138.8 LBS | OXYGEN SATURATION: 100 % | HEIGHT: 65 IN | HEART RATE: 66 BPM | BODY MASS INDEX: 23.13 KG/M2 | RESPIRATION RATE: 18 BRPM | DIASTOLIC BLOOD PRESSURE: 72 MMHG | TEMPERATURE: 97 F

## 2021-05-21 DIAGNOSIS — Z80.0 FAMILY HISTORY OF COLON CANCER: ICD-10-CM

## 2021-05-21 DIAGNOSIS — Z12.12 SCREENING FOR COLORECTAL CANCER: ICD-10-CM

## 2021-05-21 DIAGNOSIS — Z12.11 SCREENING FOR COLORECTAL CANCER: ICD-10-CM

## 2021-05-21 PROBLEM — R11.2 PONV (POSTOPERATIVE NAUSEA AND VOMITING): Status: ACTIVE | Noted: 2021-05-21

## 2021-05-21 PROBLEM — G43.909 MIGRAINE: Status: ACTIVE | Noted: 2021-05-21

## 2021-05-21 PROBLEM — Z98.890 PONV (POSTOPERATIVE NAUSEA AND VOMITING): Status: ACTIVE | Noted: 2021-05-21

## 2021-05-21 PROCEDURE — 88305 TISSUE EXAM BY PATHOLOGIST: CPT | Performed by: PATHOLOGY

## 2021-05-21 PROCEDURE — 45385 COLONOSCOPY W/LESION REMOVAL: CPT | Performed by: INTERNAL MEDICINE

## 2021-05-21 RX ORDER — LIDOCAINE HYDROCHLORIDE 10 MG/ML
0.5 INJECTION, SOLUTION EPIDURAL; INFILTRATION; INTRACAUDAL; PERINEURAL ONCE AS NEEDED
Status: DISCONTINUED | OUTPATIENT
Start: 2021-05-21 | End: 2021-05-25 | Stop reason: HOSPADM

## 2021-05-21 RX ORDER — LIDOCAINE HYDROCHLORIDE 10 MG/ML
INJECTION, SOLUTION EPIDURAL; INFILTRATION; INTRACAUDAL; PERINEURAL AS NEEDED
Status: DISCONTINUED | OUTPATIENT
Start: 2021-05-21 | End: 2021-05-21

## 2021-05-21 RX ORDER — PROPOFOL 10 MG/ML
INJECTION, EMULSION INTRAVENOUS AS NEEDED
Status: DISCONTINUED | OUTPATIENT
Start: 2021-05-21 | End: 2021-05-21

## 2021-05-21 RX ORDER — SODIUM CHLORIDE, SODIUM LACTATE, POTASSIUM CHLORIDE, CALCIUM CHLORIDE 600; 310; 30; 20 MG/100ML; MG/100ML; MG/100ML; MG/100ML
125 INJECTION, SOLUTION INTRAVENOUS CONTINUOUS
Status: DISCONTINUED | OUTPATIENT
Start: 2021-05-21 | End: 2021-05-25 | Stop reason: HOSPADM

## 2021-05-21 RX ORDER — SODIUM CHLORIDE, SODIUM LACTATE, POTASSIUM CHLORIDE, CALCIUM CHLORIDE 600; 310; 30; 20 MG/100ML; MG/100ML; MG/100ML; MG/100ML
INJECTION, SOLUTION INTRAVENOUS CONTINUOUS PRN
Status: DISCONTINUED | OUTPATIENT
Start: 2021-05-21 | End: 2021-05-21

## 2021-05-21 RX ADMIN — PROPOFOL 150 MG: 10 INJECTION, EMULSION INTRAVENOUS at 12:42

## 2021-05-21 RX ADMIN — SODIUM CHLORIDE, SODIUM LACTATE, POTASSIUM CHLORIDE, AND CALCIUM CHLORIDE 125 ML/HR: .6; .31; .03; .02 INJECTION, SOLUTION INTRAVENOUS at 11:47

## 2021-05-21 RX ADMIN — LIDOCAINE HYDROCHLORIDE 50 MG: 10 INJECTION, SOLUTION EPIDURAL; INFILTRATION; INTRACAUDAL at 12:42

## 2021-05-21 RX ADMIN — Medication 40 MG: at 12:48

## 2021-05-21 RX ADMIN — PROPOFOL 50 MG: 10 INJECTION, EMULSION INTRAVENOUS at 12:57

## 2021-05-21 RX ADMIN — PROPOFOL 50 MG: 10 INJECTION, EMULSION INTRAVENOUS at 12:43

## 2021-05-21 RX ADMIN — PROPOFOL 50 MG: 10 INJECTION, EMULSION INTRAVENOUS at 12:52

## 2021-05-21 RX ADMIN — PROPOFOL 50 MG: 10 INJECTION, EMULSION INTRAVENOUS at 12:48

## 2021-05-21 NOTE — H&P
History and Physical - [facility]  Melvi Allen 41 y o  female MRN: [id number]    HPI: Melvi Allen is a 41y o  year old female who presents for colon cancer screening, has family history of colon cancer  Review of Systems    Historical Information   Past Medical History:   Diagnosis Date    Hydronephrosis     Migraine     PONV (postoperative nausea and vomiting)     TMJ arthralgia     Resolved 10/29/2015     Ureteral stricture      Past Surgical History:   Procedure Laterality Date    APPENDECTOMY      CYSTOSCOPY W/ LASER LITHOTRIPSY Right 11/2/2017    Procedure: CYSTOSCOPY;  RIGHT URETEROSCOPY WITH  HOLMIUM LASER INCISION OF URETERAL STRICTURE; (WITH HYDROSTATIC RIGHT URETERAL DILATION), BILATERAL RETROGRADE PYELOGRAM AND INSERTION OF RIGHT URETERAL STENT X 2 (4 7 X 26);   Surgeon: Karo Martínez MD;  [facility];  Service: Urology    DILATION AND CURETTAGE OF UTERUS N/A 12/30/2016    Procedure: DILATATION AND CURETTAGE;  Surgeon: Shannan Duke MD;  [facility];  Service:    [place] DILATION AND EVACUATION      HYSTERECTOMY N/A 5/22/2017    Procedure: EXAM UNDER ANESTHESIA;  Surgeon: Shannan Duke MD;  [facility];  Service:     HYSTEROSCOPY N/A 12/30/2016    Procedure: HYSTEROSCOPY;  Surgeon: Shannan Duke MD;  [facility];  Service:    [place] CYSTOURETHROSCOPY,URETER CATHETER Bilateral 3/27/2017    Procedure: CYSTOSCOPY WITH RETROGRADE PYELOGRAM; RIGHT STENT INSERTION;  Surgeon: Karo Martínez MD;  [facility];  Service: Urology    [place] CYSTOURETHROSCOPY,URETER CATHETER N/A 1/11/2018    Procedure: CYSTOSCOPY , BILATERAL RETROGRADE PYELOGRAM WITH RIGHT STENT EXTRACTION;  Surgeon: Karo Martínez MD;  [facility];  Service: Urology    [place] CYSTOURETHROSCOPY,URETER CATHETER Right 1/15/2018    Procedure: CYSTOSCOPY, RIGHT RETROGRADE PYELOGRAM WITH INSERTION OF RIGHT STENT URETERAL;  Surgeon: Karo Martínez MD;  [facility];  Service: Urology    [place] LAPAROSCOPY W TOT HYSTERECT UTERUS 250 GRAM OR LESS N/A 3/24/2017    Procedure: TOTAL LAPAROSCOPIC HYSTERECTOMY   bilateral salpingectomy, cysto; Surgeon: Judi Tristan MD;  [facility];  Service: Gynecology    REIMPLANT URETER IN BLADDER Right 2018    Procedure: URETERAL NEOCYSTOSTOMY, EXCISION OF URETERAL STRICTURE ;  Surgeon: Raisa Brown MD;  [facility];  Service: Urology    URETERAL STENT PLACEMENT Right 2018    Procedure: INSERTION STENT URETERAL;  Surgeon: Raisa Brown MD;  [facility];  Service: Urology     Social History   Social History     Substance and Sexual Activity   Alcohol Use Yes    Alcohol/week: 2 0 standard drinks    Types: 2 Glasses of wine per week     Social History     Substance and Sexual Activity   Drug Use No     Social History     Tobacco Use   Smoking Status Former Smoker    Packs/day: 0 20    Types: Cigarettes    Quit date:     Years since quittin 3   Smokeless Tobacco Never Used   Tobacco Comment    quit 15 years ago     Family History   Problem Relation Age of Onset    Heart attack Mother     Hypertension Mother     Colon cancer Brother     Diabetes type II Paternal Grandfather        Meds/Allergies     (Not in a hospital admission)      Allergies   Allergen Reactions    Shellfish-Derived Products - Food Allergy Anaphylaxis     THROAT ITCHY MIGRAINES    Fentanyl Itching       Objective     /74   Pulse 85   Temp (!) 96 9 °F (36 1 °C) (Temporal)   Resp 16   Ht 5' 5" (1 651 m)   Wt 63 kg (138 lb 12 8 oz)   SpO2 96%   BMI 23 10 kg/m²       PHYSICAL EXAM    Gen: NAD  CV: RRR  CHEST: Clear  ABD: soft, NT/ND  EXT: no edema  Neuro: AAO      ASSESSMENT/PLAN:  This is a 41y o  year old female here for high risk screening colonoscopy      PLAN:   Procedure:  Colonoscopy   '

## 2021-05-21 NOTE — ANESTHESIA PREPROCEDURE EVALUATION
Procedure:  COLONOSCOPY    Relevant Problems   ANESTHESIA   (+) PONV (postoperative nausea and vomiting)      CARDIO (within normal limits)   (+) Migraine      /RENAL  Ureteral complications with hysterectomy  Creatinine level fine    (+) Hydronephrosis      HEMATOLOGY (within normal limits)      PULMONARY (within normal limits)        Physical Exam    Airway    Mallampati score: I  TM Distance: >3 FB  Neck ROM: full     Dental   No notable dental hx     Cardiovascular      Pulmonary      Other Findings        Anesthesia Plan  ASA Score- 1     Anesthesia Type- IV sedation with anesthesia with ASA Monitors  Additional Monitors:   Airway Plan:           Plan Factors-Exercise tolerance (METS): >4 METS  Chart reviewed  Existing labs reviewed  Patient summary reviewed  Patient is not a current smoker  Induction-     Postoperative Plan-     Informed Consent- Anesthetic plan and risks discussed with patient  I personally reviewed this patient with the CRNA  Discussed and agreed on the Anesthesia Plan with the CRNA  Gerhardt Sep

## 2021-05-21 NOTE — ANESTHESIA POSTPROCEDURE EVALUATION
Post-Op Assessment Note    CV Status:  Stable  Pain Score: 0    Pain management: adequate     Mental Status:  Sleepy   Hydration Status:  Stable and euvolemic   PONV Controlled:  None   Airway Patency:  Patent      Post Op Vitals Reviewed: Yes      Staff: CRNA         No complications documented      BP   121/68   Temp     Pulse 78   Resp 19   SpO2 98

## 2021-06-02 ENCOUNTER — TELEPHONE (OUTPATIENT)
Dept: GASTROENTEROLOGY | Facility: CLINIC | Age: 41
End: 2021-06-02

## 2021-06-02 NOTE — TELEPHONE ENCOUNTER
----- Message from Cameron Long MD sent at 6/2/2021  8:06 AM EDT -----  Please inform the patient that the polyp in the colon was sessile serrated- would recommend repeat colonoscopy in 3 years

## 2021-06-16 ENCOUNTER — TELEPHONE (OUTPATIENT)
Dept: GASTROENTEROLOGY | Facility: CLINIC | Age: 41
End: 2021-06-16

## 2021-06-16 NOTE — TELEPHONE ENCOUNTER
Transferred in call from Evette Orta, spoke with pt, pt aware of results and verbalized understanding  Pt aware 3 yr colonoscopy, and advised of fiber intake recommendation from assessment plan

## 2021-09-25 ENCOUNTER — OFFICE VISIT (OUTPATIENT)
Dept: URGENT CARE | Age: 41
End: 2021-09-25
Payer: COMMERCIAL

## 2021-09-25 ENCOUNTER — APPOINTMENT (OUTPATIENT)
Dept: RADIOLOGY | Age: 41
End: 2021-09-25
Payer: COMMERCIAL

## 2021-09-25 VITALS
SYSTOLIC BLOOD PRESSURE: 125 MMHG | TEMPERATURE: 97 F | RESPIRATION RATE: 16 BRPM | OXYGEN SATURATION: 98 % | DIASTOLIC BLOOD PRESSURE: 80 MMHG | HEART RATE: 63 BPM

## 2021-09-25 DIAGNOSIS — M72.2 PLANTAR FASCIITIS: Primary | ICD-10-CM

## 2021-09-25 DIAGNOSIS — R52 PAIN: ICD-10-CM

## 2021-09-25 DIAGNOSIS — M25.471 ANKLE SWELLING, RIGHT: ICD-10-CM

## 2021-09-25 PROCEDURE — 73610 X-RAY EXAM OF ANKLE: CPT

## 2021-09-25 PROCEDURE — G0382 LEV 3 HOSP TYPE B ED VISIT: HCPCS | Performed by: FAMILY MEDICINE

## 2021-09-25 RX ORDER — NAPROXEN 500 MG/1
500 TABLET ORAL 2 TIMES DAILY WITH MEALS
Qty: 14 TABLET | Refills: 0 | Status: SHIPPED | OUTPATIENT
Start: 2021-09-25 | End: 2021-11-09 | Stop reason: SDUPTHER

## 2021-09-25 NOTE — PROGRESS NOTES
[facility]        NAME: Jacob Joy is a 35 y o  female  : 1980    MRN: [id number]  DATE: 2021  TIME: 3:15 PM    Assessment and Plan   Plantar fasciitis [M72 2]  1  Plantar fasciitis  XR ankle 3+ vw right    naproxen (Naprosyn) 500 mg tablet   2  Ankle swelling, right       Naproxen prescribed the next 1 week and patient advised to ice, elevate and stretch her ankle  May also benefit from a cushion and heel insert  Symptoms expected to resolve in a few days  Patient Instructions     Follow up with PCP in 3-5 days  Proceed to  ER if symptoms worsen  Chief Complaint     Chief Complaint   Patient presents with    Foot Pain     pt states started with pain in bottom of right foot Thursday, pain was worse upon waking, yesterday right ankle was swollen and painful  No known injury    Ankle Pain         History of Present Illness       22-year-old female presents today with about 3 days of right ankle swelling and foot pain  Denies any obvious trauma  No other associated symptoms  Review of Systems   Review of Systems   Constitutional: Negative for chills and fever  HENT: Negative for congestion, rhinorrhea and sore throat  Respiratory: Negative for cough, shortness of breath and wheezing  Cardiovascular: Negative for chest pain  Gastrointestinal: Negative for abdominal pain and nausea  Musculoskeletal: Positive for arthralgias, gait problem and joint swelling  Skin: Negative for rash  Neurological: Negative for dizziness and headaches           Current Medications       Current Outpatient Medications:     acetaminophen (TYLENOL) 500 mg tablet, Take 1 tablet (500 mg total) by mouth every 6 (six) hours as needed for mild pain, moderate pain, headaches or fever (Patient not taking: Reported on 2020), Disp: 30 tablet, Rfl: 0    Calcium Polycarbophil (FIBER-CAPS PO), Take by mouth (Patient not taking: Reported on 2021), Disp: , Rfl:     fluticasone (FLONASE) 50 mcg/act nasal spray, 1 spray into each nostril daily (Patient not taking: Reported on 1/9/2020), Disp: 1 Bottle, Rfl: 0    ibuprofen (MOTRIN) 400 mg tablet, Take 1 tablet (400 mg total) by mouth every 6 (six) hours as needed for mild pain (Patient not taking: Reported on 9/25/2021), Disp: 30 tablet, Rfl: 0    meclizine (ANTIVERT) 25 mg tablet, Take 1 tablet (25 mg total) by mouth every 8 (eight) hours as needed for dizziness (Patient not taking: Reported on 9/25/2021), Disp: 30 tablet, Rfl: 0    Omega-3 Fatty Acids (OMEGA-3 FISH OIL PO), Take by mouth (Patient not taking: Reported on 9/25/2021), Disp: , Rfl:     Current Allergies     Allergies as of 09/25/2021 - Reviewed 09/25/2021   Allergen Reaction Noted    Shellfish-derived products - food allergy Anaphylaxis 03/27/2017    Fentanyl Itching 03/22/2018            The following portions of the patient's history were reviewed and updated as appropriate: allergies, current medications, past family history, past medical history, past social history, past surgical history and problem list      Past Medical History:   Diagnosis Date    Diverticulosis     Hydronephrosis     Migraine     PONV (postoperative nausea and vomiting)     TMJ arthralgia     Resolved 10/29/2015     Ureteral stricture        Past Surgical History:   Procedure Laterality Date    APPENDECTOMY      CYSTOSCOPY W/ LASER LITHOTRIPSY Right 11/2/2017    Procedure: CYSTOSCOPY;  RIGHT URETEROSCOPY WITH  HOLMIUM LASER INCISION OF URETERAL STRICTURE; (WITH HYDROSTATIC RIGHT URETERAL DILATION), BILATERAL RETROGRADE PYELOGRAM AND INSERTION OF RIGHT URETERAL STENT X 2 (4 7 X 26);   Surgeon: Kurt Martinez MD;  [facility];  Service: Urology    DILATION AND CURETTAGE OF UTERUS N/A 12/30/2016    Procedure: DILATATION AND CURETTAGE;  Surgeon: Opal Hu MD;  [facility];  Service:    [address] [place] [address] N/A 5/22/2017    Procedure: EXAM UNDER ANESTHESIA;  Surgeon: Mirta Moffett MD;  [facility];  Service:     HYSTEROSCOPY N/A 12/30/2016    Procedure: HYSTEROSCOPY;  Surgeon: Mirta Moffett MD;  [facility];  Service:    [place] CYSTOURETHROSCOPY,URETER CATHETER Bilateral 3/27/2017    Procedure: CYSTOSCOPY WITH RETROGRADE PYELOGRAM; RIGHT STENT INSERTION;  Surgeon: Rambo Ardon MD;  [facility];  Service: Urology    NE CYSTOURETHROSCOPY,URETER CATHETER N/A 1/11/2018    Procedure: CYSTOSCOPY , BILATERAL RETROGRADE PYELOGRAM WITH RIGHT STENT EXTRACTION;  Surgeon: Rambo Ardon MD;  [facility];  Service: Urology    NE CYSTOURETHROSCOPY,URETER CATHETER Right 1/15/2018    Procedure: CYSTOSCOPY, RIGHT RETROGRADE PYELOGRAM WITH INSERTION OF RIGHT STENT URETERAL;  Surgeon: Rambo Ardon MD;  [facility];  Service: Urology    NE LAPAROSCOPY W TOT HYSTERECT UTERUS 250 GRAM OR LESS N/A 3/24/2017    Procedure: TOTAL LAPAROSCOPIC HYSTERECTOMY   bilateral salpingectomy, cysto; Surgeon: Mirta Moffett MD;  [facility];  Service: Gynecology    REIMPLANT URETER IN BLADDER Right 2/22/2018    Procedure: URETERAL NEOCYSTOSTOMY, EXCISION OF URETERAL STRICTURE ;  Surgeon: Rambo Ardon MD;  [facility];  Service: Urology    URETERAL STENT PLACEMENT Right 2/22/2018    Procedure: INSERTION STENT URETERAL;  Surgeon: Rambo Ardon MD;  [facility];  Service: Urology       Family History   Problem Relation Age of Onset    Heart attack Mother     Hypertension Mother     Colon cancer Brother     Diabetes type II Paternal Grandfather          Medications have been verified  Objective   /80   Pulse 63   Temp (!) 97 °F (36 1 °C)   Resp 16   SpO2 98%   No LMP recorded  Patient has had a hysterectomy  Physical Exam     Physical Exam  Vitals and nursing note reviewed  Constitutional:       General: She is in acute distress (right foot and ankle)        Appearance: Normal appearance  She is obese  She is not ill-appearing, toxic-appearing or diaphoretic  HENT:      Head: Normocephalic and atraumatic  Eyes:      General:         Right eye: No discharge  Left eye: No discharge  Conjunctiva/sclera: Conjunctivae normal    Pulmonary:      Effort: Pulmonary effort is normal       Breath sounds: Normal breath sounds  Abdominal:      General: Abdomen is flat  Musculoskeletal:         General: Swelling (Right ankle), tenderness (Lateral malleolus; lateral plantar surface near the calcaneus) and signs of injury present  Normal range of motion  Skin:     General: Skin is warm  Findings: No erythema  Neurological:      General: No focal deficit present  Mental Status: She is alert and oriented to person, place, and time  Psychiatric:         Mood and Affect: Mood normal          Behavior: Behavior normal          Thought Content:  Thought content normal          Judgment: Judgment normal

## 2021-10-01 ENCOUNTER — TELEPHONE (OUTPATIENT)
Dept: FAMILY MEDICINE CLINIC | Facility: CLINIC | Age: 41
End: 2021-10-01

## 2021-11-09 ENCOUNTER — OFFICE VISIT (OUTPATIENT)
Dept: FAMILY MEDICINE CLINIC | Facility: CLINIC | Age: 41
End: 2021-11-09
Payer: COMMERCIAL

## 2021-11-09 VITALS
BODY MASS INDEX: 22.42 KG/M2 | OXYGEN SATURATION: 93 % | HEIGHT: 65 IN | SYSTOLIC BLOOD PRESSURE: 104 MMHG | RESPIRATION RATE: 16 BRPM | WEIGHT: 134.6 LBS | DIASTOLIC BLOOD PRESSURE: 64 MMHG | TEMPERATURE: 97.3 F | HEART RATE: 64 BPM

## 2021-11-09 DIAGNOSIS — G89.29 CHRONIC PAIN OF RIGHT ANKLE: ICD-10-CM

## 2021-11-09 DIAGNOSIS — H01.006 BLEPHARITIS OF EYELID OF LEFT EYE, UNSPECIFIED EYELID, UNSPECIFIED TYPE: Primary | ICD-10-CM

## 2021-11-09 DIAGNOSIS — M25.571 CHRONIC PAIN OF RIGHT ANKLE: ICD-10-CM

## 2021-11-09 DIAGNOSIS — M72.2 PLANTAR FASCIITIS: ICD-10-CM

## 2021-11-09 PROCEDURE — 99214 OFFICE O/P EST MOD 30 MIN: CPT | Performed by: FAMILY MEDICINE

## 2021-11-09 RX ORDER — ERYTHROMYCIN 5 MG/G
0.5 OINTMENT OPHTHALMIC
Qty: 3.5 G | Refills: 1 | Status: SHIPPED | OUTPATIENT
Start: 2021-11-09

## 2021-11-09 RX ORDER — NAPROXEN 500 MG/1
500 TABLET ORAL 2 TIMES DAILY WITH MEALS
Qty: 28 TABLET | Refills: 0 | Status: SHIPPED | OUTPATIENT
Start: 2021-11-09 | End: 2022-01-05

## 2021-11-10 PROBLEM — M25.572 CHRONIC PAIN OF LEFT ANKLE: Status: ACTIVE | Noted: 2021-11-10

## 2021-11-10 PROBLEM — G89.29 CHRONIC PAIN OF LEFT ANKLE: Status: ACTIVE | Noted: 2021-11-10

## 2021-11-10 PROBLEM — H01.006 BLEPHARITIS OF EYELID OF LEFT EYE: Status: ACTIVE | Noted: 2021-11-10

## 2021-11-19 ENCOUNTER — APPOINTMENT (OUTPATIENT)
Dept: LAB | Facility: AMBULARY SURGERY CENTER | Age: 41
End: 2021-11-19
Payer: COMMERCIAL

## 2021-11-19 DIAGNOSIS — M25.571 CHRONIC PAIN OF RIGHT ANKLE: ICD-10-CM

## 2021-11-19 DIAGNOSIS — G89.29 CHRONIC PAIN OF RIGHT ANKLE: ICD-10-CM

## 2021-11-19 LAB
ANION GAP SERPL CALCULATED.3IONS-SCNC: 3 MMOL/L (ref 4–13)
BUN SERPL-MCNC: 12 MG/DL (ref 5–25)
CALCIUM SERPL-MCNC: 8.8 MG/DL (ref 8.3–10.1)
CHLORIDE SERPL-SCNC: 105 MMOL/L (ref 100–108)
CO2 SERPL-SCNC: 28 MMOL/L (ref 21–32)
CREAT SERPL-MCNC: 0.72 MG/DL (ref 0.6–1.3)
GFR SERPL CREATININE-BSD FRML MDRD: 103 ML/MIN/1.73SQ M
GLUCOSE P FAST SERPL-MCNC: 87 MG/DL (ref 65–99)
POTASSIUM SERPL-SCNC: 3.6 MMOL/L (ref 3.5–5.3)
SODIUM SERPL-SCNC: 136 MMOL/L (ref 136–145)
URATE SERPL-MCNC: 4.7 MG/DL (ref 2–6.8)

## 2021-11-19 PROCEDURE — 86618 LYME DISEASE ANTIBODY: CPT

## 2021-11-19 PROCEDURE — 80048 BASIC METABOLIC PNL TOTAL CA: CPT

## 2021-11-19 PROCEDURE — 36415 COLL VENOUS BLD VENIPUNCTURE: CPT

## 2021-11-19 PROCEDURE — 84550 ASSAY OF BLOOD/URIC ACID: CPT

## 2021-11-20 LAB — B BURGDOR IGG+IGM SER-ACNC: 24

## 2021-11-23 ENCOUNTER — TELEPHONE (OUTPATIENT)
Dept: FAMILY MEDICINE CLINIC | Facility: CLINIC | Age: 41
End: 2021-11-23

## 2021-11-23 DIAGNOSIS — M25.50 POLYARTHRALGIA: Primary | ICD-10-CM

## 2021-11-27 ENCOUNTER — IMMUNIZATIONS (OUTPATIENT)
Dept: FAMILY MEDICINE CLINIC | Facility: HOSPITAL | Age: 41
End: 2021-11-27

## 2021-11-27 DIAGNOSIS — Z23 ENCOUNTER FOR IMMUNIZATION: Primary | ICD-10-CM

## 2021-11-27 PROCEDURE — 91300 COVID-19 PFIZER VACC 0.3 ML: CPT

## 2021-11-27 PROCEDURE — 0001A COVID-19 PFIZER VACC 0.3 ML: CPT

## 2021-12-09 ENCOUNTER — ANNUAL EXAM (OUTPATIENT)
Dept: FAMILY MEDICINE CLINIC | Facility: CLINIC | Age: 41
End: 2021-12-09
Payer: COMMERCIAL

## 2021-12-09 VITALS
RESPIRATION RATE: 16 BRPM | BODY MASS INDEX: 21.76 KG/M2 | DIASTOLIC BLOOD PRESSURE: 78 MMHG | WEIGHT: 130.6 LBS | HEART RATE: 86 BPM | OXYGEN SATURATION: 96 % | HEIGHT: 65 IN | SYSTOLIC BLOOD PRESSURE: 112 MMHG

## 2021-12-09 DIAGNOSIS — Z01.419 VISIT FOR PELVIC EXAM: ICD-10-CM

## 2021-12-09 DIAGNOSIS — Z00.00 ANNUAL PHYSICAL EXAM: Primary | ICD-10-CM

## 2021-12-09 PROCEDURE — 99396 PREV VISIT EST AGE 40-64: CPT | Performed by: FAMILY MEDICINE

## 2022-01-03 ENCOUNTER — TELEPHONE (OUTPATIENT)
Dept: FAMILY MEDICINE CLINIC | Facility: CLINIC | Age: 42
End: 2022-01-03

## 2022-01-03 NOTE — TELEPHONE ENCOUNTER
Left message for Pt to call back re: Pt contacted Dr Karolina Hutson requesting a call from the office

## 2022-01-04 ENCOUNTER — TELEPHONE (OUTPATIENT)
Dept: FAMILY MEDICINE CLINIC | Facility: CLINIC | Age: 42
End: 2022-01-04

## 2022-01-04 NOTE — TELEPHONE ENCOUNTER
Message to Pt via [facility] re: eye  Good morning,   I spoke with you earlier re: an appointment for your eye  In my haste, I rescheduled the appointment for the same day (Thursday)  Please call the office to schedule an appointment with Dr Yoandy Kirk as, he has a few openings until 11:15  I greatly apologize for the inconvenience     Best regards,   [address]

## 2022-01-05 ENCOUNTER — OFFICE VISIT (OUTPATIENT)
Dept: FAMILY MEDICINE CLINIC | Facility: CLINIC | Age: 42
End: 2022-01-05
Payer: COMMERCIAL

## 2022-01-05 VITALS
HEART RATE: 75 BPM | TEMPERATURE: 97.2 F | BODY MASS INDEX: 22.39 KG/M2 | OXYGEN SATURATION: 98 % | HEIGHT: 65 IN | SYSTOLIC BLOOD PRESSURE: 110 MMHG | WEIGHT: 134.4 LBS | RESPIRATION RATE: 12 BRPM | DIASTOLIC BLOOD PRESSURE: 70 MMHG

## 2022-01-05 DIAGNOSIS — M25.50 ARTHRALGIA, UNSPECIFIED JOINT: ICD-10-CM

## 2022-01-05 DIAGNOSIS — H10.31 ACUTE CONJUNCTIVITIS OF RIGHT EYE, UNSPECIFIED ACUTE CONJUNCTIVITIS TYPE: Primary | ICD-10-CM

## 2022-01-05 PROCEDURE — 99213 OFFICE O/P EST LOW 20 MIN: CPT | Performed by: FAMILY MEDICINE

## 2022-01-05 RX ORDER — OFLOXACIN 3 MG/ML
1 SOLUTION/ DROPS OPHTHALMIC 4 TIMES DAILY
Qty: 5 ML | Refills: 0 | Status: SHIPPED | OUTPATIENT
Start: 2022-01-05 | End: 2022-01-12

## 2022-01-05 NOTE — PROGRESS NOTES
FAMILY PRACTICE OFFICE VISIT       NAME: Génesis Villagran  AGE: 35 y o  SEX: female       : 1980        MRN: [id number]    DATE: 2022  TIME: 6:12 PM    Assessment and Plan   1  Acute conjunctivitis of right eye, unspecified acute conjunctivitis type  Comments:  Pt stable  Likely from hx and exam - treat at this time with Ocuflox ophth drops, and cool compresses  Orders:  -     ofloxacin (OCUFLOX) 0 3 % ophthalmic solution; Administer 1 drop into the left eye 4 (four) times a day for 7 days    2  Arthralgia, unspecified joint  Comments:  Mostly right ankle (occasionally left wrist) - stable at this time  Will be seeing Rheumatology  There are no Patient Instructions on file for this visit  Chief Complaint     Chief Complaint   Patient presents with    Facial Swelling     patient being seen for left eye swelling x 4 days    Joint Swelling     patient being seen for right ankle swelling        History of Present Illness   Elodia White is a 35y o -year-old female who presents with 3 - 4 days of left eye puffiness, redness  +Itching  Still had some drops from before  A little better today  Will be seeing a Rheumatologist soon for her ankle - better at this time  Review of Systems   Review of Systems   Constitutional: Negative for activity change and fever  Eyes: Positive for redness and itching         Active Problem List     Patient Active Problem List   Diagnosis    Vaginal injury    Ureteral stricture, right    CVA tenderness    Hydronephrosis    Preop examination    Visit for pelvic exam    Family history of colon cancer    PONV (postoperative nausea and vomiting)    Migraine    Plantar fasciitis    Chronic pain of left ankle    Blepharitis of eyelid of left eye         Past Medical History:  Past Medical History:   Diagnosis Date    Diverticulosis     Hydronephrosis     Migraine     PONV (postoperative nausea and vomiting)     TMJ arthralgia     Resolved 10/29/2015     Ureteral stricture        Past Surgical History:  Past Surgical History:   Procedure Laterality Date    APPENDECTOMY      CYSTOSCOPY W/ LASER LITHOTRIPSY Right 11/2/2017    Procedure: CYSTOSCOPY;  RIGHT URETEROSCOPY WITH  HOLMIUM LASER INCISION OF URETERAL STRICTURE; (WITH HYDROSTATIC RIGHT URETERAL DILATION), BILATERAL RETROGRADE PYELOGRAM AND INSERTION OF RIGHT URETERAL STENT X 2 (4 7 X 26); Surgeon: Hector Gonzalez MD;  [facility];  Service: Urology    DILATION AND CURETTAGE OF UTERUS N/A 12/30/2016    Procedure: DILATATION AND CURETTAGE;  Surgeon: Juan Tapia MD;  [facility];  Service:    Darrell Lamprey DILATION AND EVACUATION      HYSTERECTOMY N/A 5/22/2017    Procedure: EXAM UNDER ANESTHESIA;  Surgeon: Juan Tapia MD;  [facility];  Service:     HYSTEROSCOPY N/A 12/30/2016    Procedure: HYSTEROSCOPY;  Surgeon: Juan Tapia MD;  [facility];  Service:    Darrell Lamprey HI CYSTOURETHROSCOPY,URETER CATHETER Bilateral 3/27/2017    Procedure: CYSTOSCOPY WITH RETROGRADE PYELOGRAM; RIGHT STENT INSERTION;  Surgeon: Hector Gonzalez MD;  [facility];  Service: Urology    HI CYSTOURETHROSCOPY,URETER CATHETER N/A 1/11/2018    Procedure: CYSTOSCOPY , BILATERAL RETROGRADE PYELOGRAM WITH RIGHT STENT EXTRACTION;  Surgeon: Hector Gonzalez MD;  [facility];  Service: Urology    HI CYSTOURETHROSCOPY,URETER CATHETER Right 1/15/2018    Procedure: CYSTOSCOPY, RIGHT RETROGRADE PYELOGRAM WITH INSERTION OF RIGHT STENT URETERAL;  Surgeon: Hector Gonzalez MD;  [facility];  Service: Urology    HI LAPAROSCOPY W TOT HYSTERECT UTERUS 250 GRAM OR LESS N/A 3/24/2017    Procedure: TOTAL LAPAROSCOPIC HYSTERECTOMY   bilateral salpingectomy, cysto;   Surgeon: Juan Tapia MD;  [facility];  Service: Gynecology    REIMPLANT URETER IN BLADDER Right 2/22/2018    Procedure: URETERAL NEOCYSTOSTOMY, EXCISION OF URETERAL STRICTURE ;  Surgeon: Hector Gonzalez MD; [facility];  Service: Urology    URETERAL STENT PLACEMENT Right 2/22/2018    Procedure: INSERTION STENT URETERAL;  Surgeon: Hector Gonzalez MD;  [facility];  Service: Urology       Family History:  Family History   Problem Relation Age of Onset    Heart attack Mother     Hypertension Mother     Colon cancer Brother     Diabetes type II Paternal Grandfather        Social History:  Social History     Socioeconomic History    Marital status: /Civil Union     Spouse name: Not on file    Number of children: Not on file    Years of education: Not on file    Highest education level: Not on file   Occupational History    Occupation:    Tobacco Use    Smoking status: Former Smoker     Packs/day: 0 20     Types: Cigarettes     Quit date: 2007     Years since quitting: 15 0    Smokeless tobacco: Never Used    Tobacco comment: quit 13 years ago   Vaping Use    Vaping Use: Never used   Substance and Sexual Activity    Alcohol use: Yes     Alcohol/week: 2 0 standard drinks     Types: 2 Glasses of wine per week    Drug use: No    Sexual activity: Not on file   Other Topics Concern    Not on file   Social History Narrative    Not on file     Social Determinants of Health     Financial Resource Strain: Not on file   Food Insecurity: Not on file   Transportation Needs: Not on file   Physical Activity: Not on file   Stress: Not on file   Social Connections: Not on file   Intimate Partner Violence: Not on file   Housing Stability: Not on file       Objective     Vitals:    01/05/22 1421   BP: 110/70   Pulse: 75   Resp: 12   Temp: (!) 97 2 °F (36 2 °C)   SpO2: 98%     Wt Readings from Last 3 Encounters:   01/05/22 61 kg (134 lb 6 4 oz)   12/09/21 59 2 kg (130 lb 9 6 oz)   11/09/21 61 1 kg (134 lb 9 6 oz)       Physical Exam  Vitals and nursing note reviewed  Constitutional:       General: She is not in acute distress  Appearance: Normal appearance   She is not ill-appearing, toxic-appearing or diaphoretic  HENT:      Head: Normocephalic and atraumatic  Eyes:      Extraocular Movements:      Right eye: Normal extraocular motion  Left eye: Normal extraocular motion  Conjunctiva/sclera:      Right eye: Right conjunctiva is not injected  No exudate  Left eye: Left conjunctiva is injected  No exudate  Musculoskeletal:      Right ankle: No swelling or ecchymosis  Normal range of motion  Neurological:      Mental Status: She is alert and oriented to person, place, and time  Psychiatric:         Mood and Affect: Mood normal          Behavior: Behavior normal          Thought Content:  Thought content normal          Judgment: Judgment normal          Pertinent Laboratory/Diagnostic Studies:  Lab Results   Component Value Date    GLUCOSE 81 10/03/2014    BUN 12 11/19/2021    CREATININE 0 72 11/19/2021    CALCIUM 8 8 11/19/2021     10/03/2014    K 3 6 11/19/2021    CO2 28 11/19/2021     11/19/2021     Lab Results   Component Value Date    ALT 13 03/22/2018    AST 13 03/22/2018    ALKPHOS 61 03/22/2018    BILITOT 0 58 10/03/2014       Lab Results   Component Value Date    WBC 7 53 03/22/2018    HGB 10 0 (L) 03/22/2018    HCT 30 8 (L) 03/22/2018    MCV 83 03/22/2018     03/22/2018       No results found for: TSH    Lab Results   Component Value Date    CHOL 168 10/03/2014     Lab Results   Component Value Date    TRIG <30 10/03/2014     Lab Results   Component Value Date    HDL 91 10/03/2014     Lab Results   Component Value Date    LDLCALC Cannot Calculat 10/03/2014     No results found for: HGBA1C    Results for orders placed or performed in visit on 15/20/99   Basic metabolic panel   Result Value Ref Range    Sodium 136 136 - 145 mmol/L    Potassium 3 6 3 5 - 5 3 mmol/L    Chloride 105 100 - 108 mmol/L    CO2 28 21 - 32 mmol/L    ANION GAP 3 (L) 4 - 13 mmol/L    BUN 12 5 - 25 mg/dL    Creatinine 0 72 0 60 - 1 30 mg/dL    Glucose, Fasting 87 65 - 99 mg/dL    Calcium 8 8 8 3 - 10 1 mg/dL    eGFR 103 ml/min/1 73sq m   Uric acid   Result Value Ref Range    Uric Acid 4 7 2 0 - 6 8 mg/dL   Lyme Antibody Profile with reflex to WB   Result Value Ref Range    Lyme total antibody 24 0 00 - 119       No orders of the defined types were placed in this encounter  ALLERGIES:  Allergies   Allergen Reactions    Shellfish-Derived Products - Food Allergy Anaphylaxis     THROAT ITCHY MIGRAINES    Fentanyl Itching       Current Medications     Current Outpatient Medications   Medication Sig Dispense Refill    erythromycin (ILOTYCIN) ophthalmic ointment Administer 0 5 inches into the left eye daily at bedtime 3 5 g 1    naproxen (Naprosyn) 500 mg tablet Take 1 tablet (500 mg total) by mouth 2 (two) times a day with meals for 14 days 28 tablet 0    acetaminophen (TYLENOL) 500 mg tablet Take 1 tablet (500 mg total) by mouth every 6 (six) hours as needed for mild pain, moderate pain, headaches or fever (Patient not taking: Reported on 1/9/2020) 30 tablet 0    Calcium Polycarbophil (FIBER-CAPS PO) Take by mouth (Patient not taking: Reported on 9/25/2021)      fluticasone (FLONASE) 50 mcg/act nasal spray 1 spray into each nostril daily (Patient not taking: Reported on 1/9/2020) 1 Bottle 0    ibuprofen (MOTRIN) 400 mg tablet Take 1 tablet (400 mg total) by mouth every 6 (six) hours as needed for mild pain (Patient not taking: Reported on 9/25/2021) 30 tablet 0    meclizine (ANTIVERT) 25 mg tablet Take 1 tablet (25 mg total) by mouth every 8 (eight) hours as needed for dizziness (Patient not taking: Reported on 9/25/2021) 30 tablet 0    ofloxacin (OCUFLOX) 0 3 % ophthalmic solution Administer 1 drop into the left eye 4 (four) times a day for 7 days 5 mL 0    Omega-3 Fatty Acids (OMEGA-3 FISH OIL PO) Take by mouth (Patient not taking: Reported on 9/25/2021)       No current facility-administered medications for this visit           [facility] Maintenance   Topic Date Due    Hepatitis C Screening  Never done    Breast Cancer Screening: Mammogram  Never done    DTaP,Tdap,and Td Vaccines (2 - Td or Tdap) 07/01/2021    Influenza Vaccine (1) 09/01/2021    Cervical Cancer Screening  12/09/2022 (Originally 8/20/1999)    HIV Screening  04/14/2023 (Originally 8/20/1993)    Annual Physical  12/09/2022    Depression Screening  01/05/2023    BMI: Adult  01/05/2023    Colorectal Cancer Screening  05/21/2024    COVID-19 Vaccine  Completed    Pneumococcal Vaccine: Pediatrics (0 to 5 Years) and At-Risk Patients (6 to 59 Years)  Aged Out    HIB Vaccine  Aged Out    Hepatitis B Vaccine  Aged Out    IPV Vaccine  Aged Out    Hepatitis A Vaccine  Aged Out    Meningococcal ACWY Vaccine  Aged Out    HPV Vaccine  Aged [employer] History   Administered Date(s) Administered    COVID-19 PFIZER VACCINE 0 3 ML IM 12/22/2020, 01/11/2021, 11/27/2021    INFLUENZA 10/01/2019, 10/21/2020    Tdap 07/01/2011          Bret De Jesus DO

## 2022-01-11 ENCOUNTER — TELEPHONE (OUTPATIENT)
Dept: FAMILY MEDICINE CLINIC | Facility: CLINIC | Age: 42
End: 2022-01-11

## 2022-02-11 ENCOUNTER — APPOINTMENT (OUTPATIENT)
Dept: LAB | Facility: AMBULARY SURGERY CENTER | Age: 42
End: 2022-02-11
Payer: COMMERCIAL

## 2022-02-11 DIAGNOSIS — M25.50 POLYARTHRALGIA: ICD-10-CM

## 2022-02-11 LAB
BASOPHILS # BLD AUTO: 0.04 THOUSANDS/ΜL (ref 0–0.1)
BASOPHILS NFR BLD AUTO: 1 % (ref 0–1)
CK SERPL-CCNC: 88 U/L (ref 26–192)
CRP SERPL QL: 3.9 MG/L
EOSINOPHIL # BLD AUTO: 0.04 THOUSAND/ΜL (ref 0–0.61)
EOSINOPHIL NFR BLD AUTO: 1 % (ref 0–6)
ERYTHROCYTE [DISTWIDTH] IN BLOOD BY AUTOMATED COUNT: 11.2 % (ref 11.6–15.1)
HCT VFR BLD AUTO: 44.2 % (ref 34.8–46.1)
HGB BLD-MCNC: 14.8 G/DL (ref 11.5–15.4)
IMM GRANULOCYTES # BLD AUTO: 0.02 THOUSAND/UL (ref 0–0.2)
IMM GRANULOCYTES NFR BLD AUTO: 0 % (ref 0–2)
LYMPHOCYTES # BLD AUTO: 1.39 THOUSANDS/ΜL (ref 0.6–4.47)
LYMPHOCYTES NFR BLD AUTO: 22 % (ref 14–44)
MCH RBC QN AUTO: 31.6 PG (ref 26.8–34.3)
MCHC RBC AUTO-ENTMCNC: 33.5 G/DL (ref 31.4–37.4)
MCV RBC AUTO: 94 FL (ref 82–98)
MONOCYTES # BLD AUTO: 0.31 THOUSAND/ΜL (ref 0.17–1.22)
MONOCYTES NFR BLD AUTO: 5 % (ref 4–12)
NEUTROPHILS # BLD AUTO: 4.6 THOUSANDS/ΜL (ref 1.85–7.62)
NEUTS SEG NFR BLD AUTO: 71 % (ref 43–75)
NRBC BLD AUTO-RTO: 0 /100 WBCS
PLATELET # BLD AUTO: 213 THOUSANDS/UL (ref 149–390)
PMV BLD AUTO: 10.9 FL (ref 8.9–12.7)
RBC # BLD AUTO: 4.69 MILLION/UL (ref 3.81–5.12)
WBC # BLD AUTO: 6.4 THOUSAND/UL (ref 4.31–10.16)

## 2022-02-11 PROCEDURE — 86038 ANTINUCLEAR ANTIBODIES: CPT

## 2022-02-11 PROCEDURE — 85025 COMPLETE CBC W/AUTO DIFF WBC: CPT

## 2022-02-11 PROCEDURE — 36415 COLL VENOUS BLD VENIPUNCTURE: CPT

## 2022-02-11 PROCEDURE — 86200 CCP ANTIBODY: CPT

## 2022-02-11 PROCEDURE — 86430 RHEUMATOID FACTOR TEST QUAL: CPT

## 2022-02-11 PROCEDURE — 86140 C-REACTIVE PROTEIN: CPT

## 2022-02-11 PROCEDURE — 82550 ASSAY OF CK (CPK): CPT

## 2022-02-12 LAB — RHEUMATOID FACT SER QL LA: NEGATIVE

## 2022-02-14 LAB — RYE IGE QN: NEGATIVE

## 2022-02-15 LAB — CCP AB SER IA-ACNC: 2.3

## 2022-02-23 ENCOUNTER — OFFICE VISIT (OUTPATIENT)
Dept: FAMILY MEDICINE CLINIC | Facility: CLINIC | Age: 42
End: 2022-02-23
Payer: COMMERCIAL

## 2022-02-23 VITALS
HEART RATE: 77 BPM | HEIGHT: 65 IN | TEMPERATURE: 97.1 F | WEIGHT: 129.8 LBS | DIASTOLIC BLOOD PRESSURE: 80 MMHG | BODY MASS INDEX: 21.63 KG/M2 | OXYGEN SATURATION: 97 % | SYSTOLIC BLOOD PRESSURE: 110 MMHG | RESPIRATION RATE: 16 BRPM

## 2022-02-23 DIAGNOSIS — F41.1 GAD (GENERALIZED ANXIETY DISORDER): Primary | ICD-10-CM

## 2022-02-23 PROCEDURE — 99213 OFFICE O/P EST LOW 20 MIN: CPT | Performed by: FAMILY MEDICINE

## 2022-02-23 RX ORDER — PAROXETINE HYDROCHLORIDE 12.5 MG/1
12.5 TABLET, FILM COATED, EXTENDED RELEASE ORAL EVERY MORNING
Qty: 30 TABLET | Refills: 1 | Status: SHIPPED | OUTPATIENT
Start: 2022-02-23 | End: 2022-03-17

## 2022-02-24 PROBLEM — F41.1 GAD (GENERALIZED ANXIETY DISORDER): Status: ACTIVE | Noted: 2022-02-24

## 2022-02-24 NOTE — ASSESSMENT & PLAN NOTE
Exacerbation of a chronic issue  Prior psych admission and self harm   Occasional SI  Scored 12 on GAD7  Patient also with night sweats with a history of hysterectomy ( ovaries present)   Discussed treatment options  Explained that the most effective treatment plan consist of both pharmaco and psychotherapy  Start Paxil 12 5 mg daily  This is also approved with vasomotor symptoms  Reviewed s/e  Patient also referred to our [id number], [facility] Shaggy  May increase to 25 mg if there is partial response after 2 weeks    Strict call/ER precautions reviewed  If symptoms do not improve, consider TSH testing  F/u in 4-6 weeks or sooner as needed

## 2022-02-24 NOTE — PROGRESS NOTES
Assessment/Plan:       Problem List Items Addressed This Visit        Other    MARLIN (generalized anxiety disorder) - Primary     Exacerbation of a chronic issue  Prior psych admission and self harm   Occasional SI  Scored 12 on GAD7  Patient also with night sweats with a history of hysterectomy ( ovaries present)   Discussed treatment options  Explained that the most effective treatment plan consist of both pharmaco and psychotherapy  Start Paxil 12 5 mg daily  This is also approved with vasomotor symptoms  Reviewed s/e  Patient also referred to our [id number], Aichaie [facility]  May increase to 25 mg if there is partial response after 2 weeks  Strict call/ER precautions reviewed  If symptoms do not improve, consider TSH testing  F/u in 4-6 weeks or sooner as needed          Relevant Medications    PARoxetine (PAXIL-CR) 12 5 mg 24 hr tablet            Subjective:      Patient ID: Tila Blanchard is a 35 y o  female here for evaluation of anxiety  Pt has struggled with anxiety/depression for years  This dates back to when she was 21 and attempted suicide  Over the years, she's dealt with mental health by drinking  Over the past few months, she's felt increasingly anxious  Her parents both suffered with mental health which she feels affected her as well  Currently, she complains of increased irritatabilty, decreased concentration, anhedonia, and panic attacks  She has also had thoughts of driving off the road but is quickly reminded of her children  There is also a family discord between her and the in laws that is also fueling these emotions  Patient also shared that she's been binge drinking to help cope  Patient is interested in therapy as well as pharmacotherapy  There are no possible organic causes contributing to this  MARLIN-7 Flowsheet Screening      Most Recent Value   Over the last 2 weeks, how often have you been bothered by any of the following problems?     Feeling nervous, anxious, or on edge 2   Not being able to stop or control worrying 1   Worrying too much about different things 1   Trouble relaxing 0   Being so restless that it is hard to sit still 2   Becoming easily annoyed or irritable 3   Feeling afraid as if something awful might happen 3   MARLIN-7 Total Score 12              The following portions of the patient's history were reviewed and updated as appropriate:   She  has a past medical history of Diverticulosis, Hydronephrosis, Migraine, PONV (postoperative nausea and vomiting), TMJ arthralgia, and Ureteral stricture  She   Patient Active Problem List    Diagnosis Date Noted    MARLIN (generalized anxiety disorder) 02/24/2022    Chronic pain of left ankle 11/10/2021    Blepharitis of eyelid of left eye 11/10/2021    Plantar fasciitis 11/09/2021    PONV (postoperative nausea and vomiting) 05/21/2021    Migraine 05/21/2021    Visit for pelvic exam 02/24/2021    Family history of colon cancer 02/24/2021    Preop examination 02/08/2018    CVA tenderness 01/14/2018    Hydronephrosis 01/14/2018    Ureteral stricture, right 11/02/2017    Vaginal injury 05/22/2017     She  has a past surgical history that includes Appendectomy; Dilation and evacuation; pr cystourethroscopy,ureter catheter (Bilateral, 3/27/2017); pr laparoscopy w tot hysterect uterus 250 gram or less (N/A, 3/24/2017); Hysteroscopy (N/A, 12/30/2016); Dilation and curettage of uterus (N/A, 12/30/2016); Hysterectomy (N/A, 5/22/2017); Cystoscopy w/ laser lithotripsy (Right, 11/2/2017); pr cystourethroscopy,ureter catheter (N/A, 1/11/2018); pr cystourethroscopy,ureter catheter (Right, 1/15/2018); Reimplant ureter in bladder (Right, 2/22/2018); and Ureteral stent placement (Right, 2/22/2018)  Her family history includes Colon cancer in her brother; Diabetes type II in her paternal grandfather; Heart attack in her mother; Hypertension in her mother  She  reports that she quit smoking about 15 years ago   Her smoking use included cigarettes  She smoked 0 20 packs per day  She has never used smokeless tobacco  She reports current alcohol use of about 2 0 standard drinks of alcohol per week  She reports that she does not use drugs  Current Outpatient Medications on File Prior to Visit   Medication Sig    acetaminophen (TYLENOL) 500 mg tablet Take 1 tablet (500 mg total) by mouth every 6 (six) hours as needed for mild pain, moderate pain, headaches or fever (Patient not taking: Reported on 1/9/2020)    Calcium Polycarbophil (FIBER-CAPS PO) Take by mouth (Patient not taking: Reported on 9/25/2021)    erythromycin (ILOTYCIN) ophthalmic ointment Administer 0 5 inches into the left eye daily at bedtime    fluticasone (FLONASE) 50 mcg/act nasal spray 1 spray into each nostril daily (Patient not taking: Reported on 1/9/2020)    meclizine (ANTIVERT) 25 mg tablet Take 1 tablet (25 mg total) by mouth every 8 (eight) hours as needed for dizziness (Patient not taking: Reported on 9/25/2021)    naproxen (Naprosyn) 500 mg tablet Take 1 tablet (500 mg total) by mouth 2 (two) times a day with meals for 14 days    Omega-3 Fatty Acids (OMEGA-3 FISH OIL PO) Take by mouth (Patient not taking: Reported on 9/25/2021)     No current facility-administered medications on file prior to visit  She is allergic to shellfish-derived products - food allergy and fentanyl       Review of Systems   Respiratory: Positive for shortness of breath  Cardiovascular: Positive for chest pain (during the panic attacks ) and palpitations  Psychiatric/Behavioral: Positive for agitation, decreased concentration, sleep disturbance and suicidal ideas  The patient is nervous/anxious and is hyperactive  Objective:      /80   Pulse 77   Temp (!) 97 1 °F (36 2 °C) (Tympanic)   Resp 16   Ht 5' 5" (1 651 m)   Wt 58 9 kg (129 lb 12 8 oz)   SpO2 97%   BMI 21 60 kg/m²          Physical Exam  Vitals reviewed     Constitutional:       General: She is in acute distress  Appearance: She is not ill-appearing or toxic-appearing  HENT:      Head: Normocephalic and atraumatic  Pulmonary:      Breath sounds: Normal breath sounds  Skin:     General: Skin is warm  Neurological:      Mental Status: She is alert and oriented to person, place, and time  Psychiatric:         Attention and Perception: Attention normal          Mood and Affect: Mood is anxious  Affect is tearful           Speech: Speech normal          Cognition and Memory: Cognition and memory normal

## 2022-03-17 DIAGNOSIS — F41.1 GAD (GENERALIZED ANXIETY DISORDER): ICD-10-CM

## 2022-03-17 RX ORDER — PAROXETINE HYDROCHLORIDE 12.5 MG/1
TABLET, FILM COATED, EXTENDED RELEASE ORAL
Qty: 30 TABLET | Refills: 1 | Status: SHIPPED | OUTPATIENT
Start: 2022-03-17 | End: 2022-03-24 | Stop reason: SDUPTHER

## 2022-03-24 ENCOUNTER — TELEPHONE (OUTPATIENT)
Dept: FAMILY MEDICINE CLINIC | Facility: CLINIC | Age: 42
End: 2022-03-24

## 2022-03-24 ENCOUNTER — OFFICE VISIT (OUTPATIENT)
Dept: FAMILY MEDICINE CLINIC | Facility: CLINIC | Age: 42
End: 2022-03-24
Payer: COMMERCIAL

## 2022-03-24 VITALS
DIASTOLIC BLOOD PRESSURE: 60 MMHG | TEMPERATURE: 96.8 F | WEIGHT: 138.4 LBS | SYSTOLIC BLOOD PRESSURE: 108 MMHG | BODY MASS INDEX: 23.03 KG/M2

## 2022-03-24 DIAGNOSIS — F41.1 GAD (GENERALIZED ANXIETY DISORDER): Primary | ICD-10-CM

## 2022-03-24 DIAGNOSIS — Z12.31 ENCOUNTER FOR SCREENING MAMMOGRAM FOR BREAST CANCER: ICD-10-CM

## 2022-03-24 PROCEDURE — 99213 OFFICE O/P EST LOW 20 MIN: CPT | Performed by: FAMILY MEDICINE

## 2022-03-24 RX ORDER — PAROXETINE HYDROCHLORIDE 12.5 MG/1
12.5 TABLET, FILM COATED, EXTENDED RELEASE ORAL EVERY MORNING
Qty: 90 TABLET | Refills: 2 | Status: SHIPPED | OUTPATIENT
Start: 2022-03-24 | End: 2022-04-05 | Stop reason: SDUPTHER

## 2022-03-25 NOTE — ASSESSMENT & PLAN NOTE
Noticeable improvement with Paxil 12 5   No s/e reported   Has an upcoming appt with [facility])  Continue current dose   Would continue for 6-8 months to ensure remission before we can consider tapering off   F/u in 6 months or sooner as needed

## 2022-03-25 NOTE — PROGRESS NOTES
Assessment/Plan:       Problem List Items Addressed This Visit        Other    MARLIN (generalized anxiety disorder) - Primary     Noticeable improvement with Paxil 12 5   No s/e reported   Has an upcoming appt with [facility] and is looking forward to it   Continue current dose   Would continue for 6-8 months to ensure remission before we can consider tapering off   F/u in 6 months or sooner as needed          Relevant Medications    PARoxetine (PAXIL-CR) 12 5 mg 24 hr tablet      Other Visit Diagnoses     Encounter for screening mammogram for breast cancer        Relevant Orders    Mammo screening bilateral w 3d & cad            Subjective:      Patient ID: Lida Chandler is a 35 y o  female here to follow up depression/anxiety  Doing well on Paxil  Has noticed a difference  Notes an improvement in mood, concentration, and outlook  No longer sweating  Has cut down on the drinking  Drinking a glass of wine a few times a week  Sleep si good  No side effects with medication  Going to [place] this summer for a family vacation    The following portions of the patient's history were reviewed and updated as appropriate:   She  has a past medical history of Diverticulosis, Hydronephrosis, Migraine, PONV (postoperative nausea and vomiting), TMJ arthralgia, and Ureteral stricture    She   Patient Active Problem List    Diagnosis Date Noted    MARLIN (generalized anxiety disorder) 02/24/2022    Chronic pain of left ankle 11/10/2021    Blepharitis of eyelid of left eye 11/10/2021    Plantar fasciitis 11/09/2021    PONV (postoperative nausea and vomiting) 05/21/2021    Migraine 05/21/2021    Visit for pelvic exam 02/24/2021    Family history of colon cancer 02/24/2021    Preop examination 02/08/2018    CVA tenderness 01/14/2018    Hydronephrosis 01/14/2018    Ureteral stricture, right 11/02/2017    Vaginal injury 05/22/2017     She  has a past surgical history that includes Appendectomy; Dilation and evacuation; pr cystourethroscopy,ureter catheter (Bilateral, 3/27/2017); pr laparoscopy w tot hysterect uterus 250 gram or less (N/A, 3/24/2017); Hysteroscopy (N/A, 12/30/2016); Dilation and curettage of uterus (N/A, 12/30/2016); Hysterectomy (N/A, 5/22/2017); Cystoscopy w/ laser lithotripsy (Right, 11/2/2017); pr cystourethroscopy,ureter catheter (N/A, 1/11/2018); pr cystourethroscopy,ureter catheter (Right, 1/15/2018); Reimplant ureter in bladder (Right, 2/22/2018); and Ureteral stent placement (Right, 2/22/2018)  Her family history includes Colon cancer in her brother; Diabetes type II in her paternal grandfather; Heart attack in her mother; Hypertension in her mother  She  reports that she quit smoking about 15 years ago  Her smoking use included cigarettes  She smoked 0 20 packs per day  She has never used smokeless tobacco  She reports current alcohol use of about 2 0 standard drinks of alcohol per week  She reports that she does not use drugs  Current Outpatient Medications on File Prior to Visit   Medication Sig    erythromycin (ILOTYCIN) ophthalmic ointment Administer 0 5 inches into the left eye daily at bedtime    naproxen (Naprosyn) 500 mg tablet Take 1 tablet (500 mg total) by mouth 2 (two) times a day with meals for 14 days     No current facility-administered medications on file prior to visit  She is allergic to shellfish-derived products - food allergy and fentanyl       Review of Systems   Psychiatric/Behavioral: Negative for agitation and decreased concentration  The patient is not nervous/anxious  Objective:      /60   Temp (!) 96 8 °F (36 °C) (Tympanic)   Wt 62 8 kg (138 lb 6 4 oz)   BMI 23 03 kg/m²          Physical Exam  Vitals reviewed  Constitutional:       General: She is not in acute distress  Appearance: Normal appearance  She is not ill-appearing  Eyes:      Extraocular Movements: Extraocular movements intact     Pulmonary:      Effort: Pulmonary effort is normal  Skin:     General: Skin is warm  Neurological:      Mental Status: She is alert  Psychiatric:         Mood and Affect: Mood normal          Behavior: Behavior normal          Thought Content:  Thought content normal          Judgment: Judgment normal       Comments: Cheerful and laughing

## 2022-03-30 ENCOUNTER — SOCIAL WORK (OUTPATIENT)
Dept: BEHAVIORAL/MENTAL HEALTH CLINIC | Facility: CLINIC | Age: 42
End: 2022-03-30
Payer: COMMERCIAL

## 2022-03-30 DIAGNOSIS — F41.1 GAD (GENERALIZED ANXIETY DISORDER): Primary | ICD-10-CM

## 2022-03-30 PROCEDURE — 90834 PSYTX W PT 45 MINUTES: CPT | Performed by: SOCIAL WORKER

## 2022-03-30 NOTE — PSYCH
Psychotherapy Provided: Individual Psychotherapy 60 minutes from 10:00-11:00    Length of time in session: 60 minutes, follow up in [facility] 179    Encounter Diagnosis     ICD-10-CM    1  MARLIN (generalized anxiety disorder)  F41 1        Goals addressed in session: Goal 1     Pain:      none    0    Current suicide risk : Low     OSMAR- Génesis details a long hx of anxiety and depression issues related to unresolved grief issues related to her brother's passing from cancer and complicated by lack of support from family, mother-in-law during this difficult time  Details chaotic family hx and reports alcohol abuse issues with parents  Exposed to verbal and emotional trauma  Mood has worsened due to pandemic and stress stemming from work, marital communication and lack of boundaries with extended family  Reports some incremental gains from Paxil  However, still feels anxious, struggling with rumintaing thoughts  Some issues with mood lability, decreased energy and motivation  Feels worn out by stressors and does not feel happy  Has hx of dealing with stress via drinking  Denies any SI     TERENCE- Génesis presents as anxious, somewhat irritable and is tearful at points during session  Wants to improve mood issues to be happy and to be better example for her children  She is verbal, cooperative, well oriented and engaged during session  P- processed issues/stressors and their influence on her mood over time- validation and supportive therapy provided  Began reviewing boundaries and expectations to apply to family relationships to lessen stress and to make her own needs more primary  Gives me permission to speak with PCP regarding her mood issues  Needs later hours than I have so refer her for therapy due to her unresolved issues related to trauma and grief  Provided direct contact information      Behavioral Health Treatment Plan [facility]: Diagnosis and Treatment Plan explained to Benjamin Pickett relates understanding diagnosis and is agreeable to Treatment Plan   Yes

## 2022-04-01 ENCOUNTER — TELEPHONE (OUTPATIENT)
Dept: PSYCHIATRY | Facility: CLINIC | Age: 42
End: 2022-04-01

## 2022-04-01 NOTE — TELEPHONE ENCOUNTER
Pt was returning your call  She can be reached on her cell until 12  After that if the cell goes right to voice mail then you can call her at her work number

## 2022-04-01 NOTE — TELEPHONE ENCOUNTER
Behavorial Health Outpatient Intake Questions    Referred by:Simran Haque       Please advised interviewee that they need to answer all questions truthfully to allow for best care and any misrepresentations of information may affect their ability to be seen at this clinic   => Was this discussed? Yes     Behavorial Health Outpatient Intake History -     Presenting Problem (in patient's words): Anxiety, depression, many years of what may be described as trauma    Are there any developmental disabilities? ? If yes, can they speak to you on the phone? If they are too limited to speak to you on phone, refer out No    Are you taking any psychiatric medications? Yes    => If yes, who prescribes? If yes, are they injectable medications? paxil = PCP   Does the patient have a language barrier or hearing impairment? No    Have you been treated at [facility] by a therapist or a doctor in the past? If yes, who? No    Has the patient been hospitalized for mental health? No   If yes, how long ago was last hospitalization and where was it? Do you actively use alcohol or marijuana or illegal substances? If yes, what and how much - refer out to Drug and alcohol treatment if use is excessive or daily use of illegal substances There are suspicions of alcohol abuse reported by the patient  Do you have a community treatment team or ? No    Legal History-     Does the patient have any history of arrests, [facility]/senior living time, or DUIs? No  If Yes-  1) What types of charges? 2) When were they last incarcerated? 3) Are they currently on parole or probation? Minor Child-    Who has custody of the child? Is there a custody agreement? If there is a custody agreement remind parent that they must bring a copy to the first appt or they will not be seen       Intake Team, please check with provider before scheduling if flags come up such as:  - complex case  - legal history (other than DUI)  - communication barrier concerns are present  - if, in your judgment, this needs further review    ACCEPTED as a patient Yes  => Appointment Date: 05/26/2022 @ 3: pm w/ Dr Yue Horowitz? No    Name of Insurance Co: [place] ID# [id number]  Insurance Phone #  If ins is primary or secondary  If patient is a minor, parents information such as Name, D  O B of guarantor

## 2022-04-05 DIAGNOSIS — F41.1 GAD (GENERALIZED ANXIETY DISORDER): ICD-10-CM

## 2022-04-05 RX ORDER — PAROXETINE HYDROCHLORIDE 25 MG/1
25 TABLET, FILM COATED, EXTENDED RELEASE ORAL EVERY MORNING
Qty: 30 TABLET | Refills: 1 | Status: SHIPPED | OUTPATIENT
Start: 2022-04-05 | End: 2022-04-27

## 2022-04-26 ENCOUNTER — CONSULT (OUTPATIENT)
Dept: RHEUMATOLOGY | Facility: CLINIC | Age: 42
End: 2022-04-26
Payer: COMMERCIAL

## 2022-04-26 VITALS
WEIGHT: 142.9 LBS | BODY MASS INDEX: 23.78 KG/M2 | SYSTOLIC BLOOD PRESSURE: 102 MMHG | HEART RATE: 71 BPM | DIASTOLIC BLOOD PRESSURE: 82 MMHG

## 2022-04-26 DIAGNOSIS — M25.50 POLYARTHRALGIA: Primary | ICD-10-CM

## 2022-04-26 DIAGNOSIS — Z11.59 NEED FOR HEPATITIS C SCREENING TEST: ICD-10-CM

## 2022-04-26 DIAGNOSIS — M25.532 BILATERAL WRIST PAIN: ICD-10-CM

## 2022-04-26 DIAGNOSIS — Z13.29 SCREENING FOR THYROID DISORDER: ICD-10-CM

## 2022-04-26 DIAGNOSIS — M25.531 BILATERAL WRIST PAIN: ICD-10-CM

## 2022-04-26 DIAGNOSIS — E55.9 VITAMIN D DEFICIENCY: ICD-10-CM

## 2022-04-26 PROCEDURE — 99245 OFF/OP CONSLTJ NEW/EST HI 55: CPT | Performed by: INTERNAL MEDICINE

## 2022-04-26 NOTE — PROGRESS NOTES
Assessment and Plan:   Ms Vonda Fitzpatrick is a 58-year-old female with no significant past medical history who presents for an evaluation of joint pains and swelling  She is referred by Dr Laverne Dubon for a rheumatology consult  Marylin Mccormack presents today for an evaluation of intermittent joint pain and swelling which has been occurring over the past year while being asymptomatic in between these episodes  Her extra-articular review of systems is unrevealing except for history of a skin rash which has been determined to be secondary to pityriasis rosea and her autoimmune/inflammatory serologies done so far have also been unremarkable  Based on the description of her symptoms as well as possible soft tissue swelling noted at her bilateral wrist joints on examination today I would like to further evaluate for the possibility of an inflammatory arthritis with the testing as listed below  In the meanwhile she will continue with Tylenol and NSAIDs as needed but I requested she contact me if she is to have a severe flare-up for which we can trial her on steroids  I requested she send me pictures on my chart when the swelling of the joints recurs  In regards to the neck pain and swelling she is appreciating at today's visit this seems to be secondary to a trapezius muscle strain     - Depending on the rheumatological workup we will then determine if she may benefit from a referral to Allergy/immunology to assess for a possible allergic response contributing to her symptoms (as with the more recent episodes she has noticed this possibly occurring after consumption of steak)  Plan:  Diagnoses and all orders for this visit:    Polyarthralgia  -     Sedimentation rate, automated; Future  -     Sjogren's Antibodies; Future  -     Ferritin; Future  -     HLA-B27 antigen; Future  -     Ambulatory referral to Rheumatology  -     Anti-neutrophilic cytoplasmic antibody;  Future    Bilateral wrist pain  -     [facility] limited; Future    Need for hepatitis C screening test  -     Chronic Hepatitis Panel; Future    Screening for thyroid disorder  -     TSH, 3rd generation; Future    Vitamin D deficiency  -     Vitamin D 25 hydroxy; Future      I have personally reviewed pertinent films in PACS of the right ankle XR which is normal        Activities as tolerated  Continue other medications as prescribed by PCP and other specialists  RTC in 3 months  HPI  Ms Dejan Bynum is a 75-year-old female with no significant past medical history who presents for an evaluation of joint pains and swelling  She is referred by Dr Amber Lundberg for a rheumatology consult  Patient reports over the past year she has had occurrences of joint pains and swelling which has intermittently affected her wrists, shoulders, legs, knees and feet  She reports these episodes can occur every few weeks without any aggravating factors she has been able to identify (possible association with consumption of steak that she noticed with 2 episodes) and will resolve spontaneously within a few days  In between episodes she is asymptomatic and does not have chronic joint pains and swelling  She reports usually one joint will be affected at a time  She has not had any significant involvement in her hands, elbows, hips or ankles  Apart from affecting her in the joints themselves she has also noticed swelling in her lower neck region near the shoulder blade as well as in her thigh  No stiffness of the joints  She will take Tylenol, over-the-counter NSAID or prescription naproxen as needed which does help her  She reports infrequent painful canker sores  She has a history of pityriasis rosea and will get a rash or hive-like appearance only on her torso    No history of fevers, unintentional weight loss, alopecia, significant dry eye/dry mouth, inflammatory eye disease, psoriasis, photosensitivity, nose ulcers, swollen glands, chest pain, shortness of breath, inflammatory bowel disease, blood clots, Raynaud's or a family history of autoimmune disease  She reports a sinus infection last year but is unable to recall if this was prior to the onset of her symptoms  She does not get recurrent sinus infections  No major comorbid health issues and no new medications  She was seen by her primary care physician for her symptoms and had testing done which showed an unremarkable BRETT, CRP, rheumatoid factor, anti CCP antibody, CK, Lyme antibody profile and uric acid level  The following portions of the patient's history were reviewed and updated as appropriate: allergies, current medications, past family history, past medical history, past social history, past surgical history and problem list       Review of Systems  Constitutional: Negative for weight change, fevers, chills, night sweats, fatigue  ENT/Mouth: Negative for hearing changes, ear pain, nasal congestion, sinus pain, hoarseness, sore throat, rhinorrhea, swallowing difficulty  Eyes: Negative for pain, redness, discharge, vision changes  Cardiovascular: Negative for chest pain, SOB, palpitations  Respiratory: Negative for cough, sputum, wheezing, dyspnea  Gastrointestinal: Negative for nausea, vomiting, diarrhea, constipation, pain, heartburn  Genitourinary: Negative for dysuria, urinary frequency, hematuria  Musculoskeletal: As per HPI  Skin: Negative for skin rash, color changes  Neuro: Negative for weakness, numbness, tingling, loss of consciousness  Psych: Negative for anxiety, depression  Heme/Lymph: Negative for easy bruising, bleeding, lymphadenopathy          Past Medical History:   Diagnosis Date    Diverticulosis     Hydronephrosis     Migraine     PONV (postoperative nausea and vomiting)     TMJ arthralgia     Resolved 10/29/2015     Ureteral stricture        Past Surgical History:   Procedure Laterality Date    APPENDECTOMY      CYSTOSCOPY W/ LASER LITHOTRIPSY Right 11/2/2017 Procedure: CYSTOSCOPY;  RIGHT URETEROSCOPY WITH  HOLMIUM LASER INCISION OF URETERAL STRICTURE; (WITH HYDROSTATIC RIGHT URETERAL DILATION), BILATERAL RETROGRADE PYELOGRAM AND INSERTION OF RIGHT URETERAL STENT X 2 (4 7 X 26); Surgeon: Shad Hughes MD;  [facility];  Service: Urology    DILATION AND CURETTAGE OF UTERUS N/A 12/30/2016    Procedure: DILATATION AND CURETTAGE;  Surgeon: Azam Fatima MD;  [facility];  Service:    [facility] DILATION AND EVACUATION      HYSTERECTOMY N/A 5/22/2017    Procedure: EXAM UNDER ANESTHESIA;  Surgeon: Azam Fatima MD;  [facility];  Service:     HYSTEROSCOPY N/A 12/30/2016    Procedure: HYSTEROSCOPY;  Surgeon: Azam Fatima MD;  [facility];  Service:    [facility] NC CYSTOURETHROSCOPY,URETER CATHETER Bilateral 3/27/2017    Procedure: CYSTOSCOPY WITH RETROGRADE PYELOGRAM; RIGHT STENT INSERTION;  Surgeon: Shad Hughes MD;  [facility];  Service: Urology    NC CYSTOURETHROSCOPY,URETER CATHETER N/A 1/11/2018    Procedure: CYSTOSCOPY , BILATERAL RETROGRADE PYELOGRAM WITH RIGHT STENT EXTRACTION;  Surgeon: Shad Hughes MD;  [facility];  Service: Urology    NC CYSTOURETHROSCOPY,URETER CATHETER Right 1/15/2018    Procedure: CYSTOSCOPY, RIGHT RETROGRADE PYELOGRAM WITH INSERTION OF RIGHT STENT URETERAL;  Surgeon: Shad Hughes MD;  [facility];  Service: Urology    NC LAPAROSCOPY W TOT HYSTERECT UTERUS 250 GRAM OR LESS N/A 3/24/2017    Procedure: TOTAL LAPAROSCOPIC HYSTERECTOMY   bilateral salpingectomy, cysto;   Surgeon: Azam Fatima MD;  [facility];  Service: Gynecology    REIMPLANT URETER IN BLADDER Right 2/22/2018    Procedure: URETERAL NEOCYSTOSTOMY, EXCISION OF URETERAL STRICTURE ;  Surgeon: Shad Hughes MD;  [facility];  Service: Urology    URETERAL STENT PLACEMENT Right 2/22/2018    Procedure: INSERTION STENT URETERAL;  Surgeon: Shad Hughes MD;  [facility];  Service: Urology Social History     Socioeconomic History    Marital status: /Civil Union     Spouse name: Not on file    Number of children: Not on file    Years of education: Not on file    Highest education level: Not on file   Occupational History    Occupation:    Tobacco Use    Smoking status: Former Smoker     Packs/day: 0 20     Types: Cigarettes     Quit date: 2007     Years since quitting: 15 3    Smokeless tobacco: Never Used    Tobacco comment: quit 13 years ago   Vaping Use    Vaping Use: Never used   Substance and Sexual Activity    Alcohol use:  Yes     Alcohol/week: 2 0 standard drinks     Types: 2 Glasses of wine per week    Drug use: No    Sexual activity: Not on file   Other Topics Concern    Not on file   Social History Narrative    Not on file     Social Determinants of Health     Financial Resource Strain: Not on file   Food Insecurity: Not on file   Transportation Needs: Not on file   Physical Activity: Not on file   Stress: Not on file   Social Connections: Not on file   Intimate Partner Violence: Not on file   Housing Stability: Not on file       Family History   Problem Relation Age of Onset    Heart attack Mother     Hypertension Mother     Colon cancer Brother     Diabetes type II Paternal Grandfather        Allergies   Allergen Reactions    Shellfish-Derived Products - Food Allergy Anaphylaxis     THROAT ITCHY MIGRAINES    Fentanyl Itching       Current Outpatient Medications:     erythromycin (ILOTYCIN) ophthalmic ointment, Administer 0 5 inches into the left eye daily at bedtime, Disp: 3 5 g, Rfl: 1    naproxen (Naprosyn) 500 mg tablet, Take 1 tablet (500 mg total) by mouth 2 (two) times a day with meals for 14 days, Disp: 28 tablet, Rfl: 0    PARoxetine (PAXIL-CR) 25 mg 24 hr tablet, Take 1 tablet (25 mg total) by mouth every morning, Disp: 30 tablet, Rfl: 1      Objective:    Vitals:    04/26/22 0952   BP: 102/82   Pulse: 71   Weight: 64 8 kg (142 lb 14 4 oz)       Physical Exam  General: Well appearing, well nourished, in no distress  Oriented x 3, normal mood and affect  Ambulating without difficulty  Skin: Good turgor, no unusual bruising or prominent lesions  Small circular rash noted on her left upper chest    Hair: Normal texture and distribution  Nails: Normal color, no deformities  No pitting  HEENT:  Head: Normocephalic, atraumatic  Eyes: Conjunctiva clear, sclera non-icteric, EOM intact  Extremities: No amputations or deformities, cyanosis, edema  Musculoskeletal:   Hands - unremarkable  Wrists - fullness appreciated bilaterally - ? Soft tissue swelling  No tenderness or restriction in range of motion  Elbows shoulders - unremarkable  Knees, ankles and feet - unremarkable  Firm soft tissue swelling with tenderness noted in her right upper trapezius region  Neurologic: Alert and oriented  No focal neurological deficits appreciated  Psychiatric: Normal mood and affect  LYNN Brown    Rheumatology

## 2022-04-27 DIAGNOSIS — F41.1 GAD (GENERALIZED ANXIETY DISORDER): ICD-10-CM

## 2022-04-27 RX ORDER — PAROXETINE HYDROCHLORIDE 25 MG/1
TABLET, FILM COATED, EXTENDED RELEASE ORAL
Qty: 30 TABLET | Refills: 1 | Status: SHIPPED | OUTPATIENT
Start: 2022-04-27 | End: 2022-05-24

## 2022-05-24 DIAGNOSIS — F41.1 GAD (GENERALIZED ANXIETY DISORDER): ICD-10-CM

## 2022-05-24 RX ORDER — PAROXETINE HYDROCHLORIDE 25 MG/1
TABLET, FILM COATED, EXTENDED RELEASE ORAL
Qty: 30 TABLET | Refills: 1 | Status: SHIPPED | OUTPATIENT
Start: 2022-05-24 | End: 2022-06-07

## 2022-05-26 ENCOUNTER — SOCIAL WORK (OUTPATIENT)
Dept: BEHAVIORAL/MENTAL HEALTH CLINIC | Facility: CLINIC | Age: 42
End: 2022-05-26
Payer: COMMERCIAL

## 2022-05-26 DIAGNOSIS — F33.1 MODERATE EPISODE OF RECURRENT MAJOR DEPRESSIVE DISORDER (HCC): ICD-10-CM

## 2022-05-26 DIAGNOSIS — F41.1 GAD (GENERALIZED ANXIETY DISORDER): ICD-10-CM

## 2022-05-26 DIAGNOSIS — F10.10 ALCOHOL ABUSE: Primary | ICD-10-CM

## 2022-05-26 PROCEDURE — 90791 PSYCH DIAGNOSTIC EVALUATION: CPT | Performed by: SOCIAL WORKER
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
## 2022-05-27 ENCOUNTER — TELEPHONE (OUTPATIENT)
Dept: PSYCHIATRY | Facility: CLINIC | Age: 42
End: 2022-05-27

## 2022-05-27 NOTE — BH TREATMENT PLAN
Dominic Guardado  1980       Date of Initial Treatment Plan: 05/26/2022   Date of Current Treatment Plan: 05/26/2022    Treatment Plan Number Initial    Strengths/Personal Resources for Self Care: horse riding, helps others, cooking and baking    Diagnosis:   1  Alcohol abuse     2  MARLIN (generalized anxiety disorder)     3  Moderate episode of recurrent major depressive disorder ([facility] [address] )         Area of Needs: please see below      Long Term Goal 1: I want to have less depression and anxiety  I want to feel vibrant again and I want to want to do things again  Target Date: 11/20/2022  Completion Date: TBD         Short Term Objectives for Goal 1: mindfulness, CBT and solution focused therapy strategies  Long Term Goal 2: I need to learn how to get along better with my 's dysfunctional family members  Target Date: 11/20/2022  Completion Date: TBD    Short Term Objectives for Goal 2: systems strategies and eventually bring my  into sessions         Long Term Goal # 3: I need to limit my drinking and not use it to numb myself     Target Date: 11/20/2022  Completion Date: TBD    Short Term Objectives for Goal 3: relapse prevention strategies and to discuss sober coping strategies  GOAL 1: Modality: Individual 2x per month   Completion Date tbd    GOAL 2: Modality: Individual 2x per month   Completion Date tbd     GOAL 3: Modality: Individual 2x per month   Completion Date tbd      Behavioral Health Treatment Plan [facility]: Diagnosis and Treatment Plan explained to Jam Leung relates understanding diagnosis and is agreeable to Treatment Plan  Client Comments : Please share your thoughts, feelings, need and/or experiences regarding your treatment plan: Shayne Ware and the undersigned will work as a team to help her progress on her goals

## 2022-06-07 DIAGNOSIS — F41.1 GAD (GENERALIZED ANXIETY DISORDER): ICD-10-CM

## 2022-06-07 RX ORDER — PAROXETINE HYDROCHLORIDE 25 MG/1
TABLET, FILM COATED, EXTENDED RELEASE ORAL
Qty: 90 TABLET | Refills: 1 | Status: SHIPPED | OUTPATIENT
Start: 2022-06-07

## 2022-06-08 ENCOUNTER — OFFICE VISIT (OUTPATIENT)
Dept: FAMILY MEDICINE CLINIC | Facility: CLINIC | Age: 42
End: 2022-06-08
Payer: COMMERCIAL

## 2022-06-08 VITALS
HEART RATE: 71 BPM | RESPIRATION RATE: 16 BRPM | OXYGEN SATURATION: 97 % | WEIGHT: 141.2 LBS | HEIGHT: 65 IN | BODY MASS INDEX: 23.53 KG/M2 | DIASTOLIC BLOOD PRESSURE: 68 MMHG | TEMPERATURE: 97.4 F | SYSTOLIC BLOOD PRESSURE: 104 MMHG

## 2022-06-08 DIAGNOSIS — R05.3 POST-COVID CHRONIC COUGH: ICD-10-CM

## 2022-06-08 DIAGNOSIS — R05.3 POST-COVID CHRONIC COUGH: Primary | ICD-10-CM

## 2022-06-08 DIAGNOSIS — U09.9 POST-COVID CHRONIC COUGH: ICD-10-CM

## 2022-06-08 DIAGNOSIS — U09.9 POST-COVID CHRONIC COUGH: Primary | ICD-10-CM

## 2022-06-08 PROCEDURE — 99213 OFFICE O/P EST LOW 20 MIN: CPT | Performed by: FAMILY MEDICINE

## 2022-06-08 RX ORDER — BENZONATATE 200 MG/1
200 CAPSULE ORAL 3 TIMES DAILY PRN
Qty: 20 CAPSULE | Refills: 0 | Status: SHIPPED | OUTPATIENT
Start: 2022-06-08

## 2022-06-08 RX ORDER — DEXAMETHASONE 4 MG/1
2 TABLET ORAL 2 TIMES DAILY
Qty: 12 G | Refills: 0 | Status: SHIPPED | OUTPATIENT
Start: 2022-06-08 | End: 2022-06-09

## 2022-06-08 NOTE — PROGRESS NOTES
Assessment/Plan:         Problem List Items Addressed This Visit        Other    Post-COVID chronic cough - Primary     Persistent cough since testing positive for COVID in May   Cough a/w chest tightness, SOB, and wheezing   Stable on exam and O2 97% on RA   Start flovent BID x 2 weeks with tessalon pearls  Asked to call if symptoms don't improve            Relevant Medications    benzonatate (TESSALON) 200 MG capsule    fluticasone (Flovent HFA) 110 MCG/ACT inhaler            Subjective:      Patient ID: Akash Lo is a 35 y o  female here with ongoing chest tightness, cough, and wheezing since testing positive for COVID in May  No fever, headache, GI symptoms, PND, chest pain,and  palpitations  Mucinex and sudafed mildly effective  Coughing more at night  The following portions of the patient's history were reviewed and updated as appropriate: allergies, current medications, past family history, past medical history, past social history, past surgical history and problem list     Review of Systems   Constitutional: Negative for fever  HENT: Negative for postnasal drip and sore throat  Respiratory: Positive for cough, chest tightness, shortness of breath and wheezing  Neurological: Negative for headaches  Objective:      /68 (BP Location: Left arm)   Pulse 71   Temp (!) 97 4 °F (36 3 °C)   Resp 16   Ht 5' 5" (1 651 m)   Wt 64 kg (141 lb 3 2 oz)   SpO2 97%   BMI 23 50 kg/m²          Physical Exam  Vitals reviewed  Constitutional:       General: She is not in acute distress  Appearance: Normal appearance  She is not ill-appearing  HENT:      Head: Normocephalic and atraumatic  Nose: No congestion  Mouth/Throat:      Mouth: Mucous membranes are moist       Pharynx: No posterior oropharyngeal erythema  Eyes:      Extraocular Movements: Extraocular movements intact  Cardiovascular:      Rate and Rhythm: Normal rate and regular rhythm  Heart sounds:  No murmur heard  Pulmonary:      Effort: Pulmonary effort is normal  No respiratory distress  Breath sounds: Normal breath sounds  No stridor  No wheezing, rhonchi or rales  Abdominal:      General: Abdomen is flat  There is no distension  Palpations: There is no mass  Tenderness: There is no abdominal tenderness  There is no guarding or rebound  Hernia: No hernia is present  Lymphadenopathy:      Cervical: No cervical adenopathy  Skin:     General: Skin is warm  Neurological:      Mental Status: She is alert and oriented to person, place, and time  Psychiatric:         Mood and Affect: Mood normal          Behavior: Behavior normal          Thought Content:  Thought content normal

## 2022-06-08 NOTE — ASSESSMENT & PLAN NOTE
Persistent cough since testing positive for COVID in May   Cough a/w chest tightness, SOB, and wheezing   Stable on exam and O2 97% on RA   Start flovent BID x 2 weeks with tessalon pearls     Asked to call if symptoms don't improve

## 2022-06-09 RX ORDER — FLUTICASONE PROPIONATE 110 UG/1
2 AEROSOL, METERED RESPIRATORY (INHALATION) 2 TIMES DAILY
Qty: 12 G | Refills: 0 | Status: SHIPPED | OUTPATIENT
Start: 2022-06-09

## 2022-06-09 RX ORDER — FLUTICASONE PROPIONATE 110 UG/1
AEROSOL, METERED RESPIRATORY (INHALATION)
Qty: 12 G | Refills: 0 | Status: SHIPPED | OUTPATIENT
Start: 2022-06-09 | End: 2022-06-09

## 2022-06-10 ENCOUNTER — TELEPHONE (OUTPATIENT)
Dept: FAMILY MEDICINE CLINIC | Facility: CLINIC | Age: 42
End: 2022-06-10

## 2022-06-10 NOTE — TELEPHONE ENCOUNTER
Patient called and stated that the flovent that Dr Cat Duran sent over yesterday that the pharmacy will not give it to her due to it being a refill for the patient  But the patient stated that it is not a refill she has never had this before  Please advise

## 2022-06-16 ENCOUNTER — SOCIAL WORK (OUTPATIENT)
Dept: BEHAVIORAL/MENTAL HEALTH CLINIC | Facility: CLINIC | Age: 42
End: 2022-06-16
Payer: COMMERCIAL

## 2022-06-16 DIAGNOSIS — F41.1 GAD (GENERALIZED ANXIETY DISORDER): ICD-10-CM

## 2022-06-16 DIAGNOSIS — F33.1 MODERATE EPISODE OF RECURRENT MAJOR DEPRESSIVE DISORDER (HCC): ICD-10-CM

## 2022-06-16 DIAGNOSIS — F10.10 ALCOHOL ABUSE: Primary | ICD-10-CM

## 2022-06-16 PROCEDURE — 90834 PSYTX W PT 45 MINUTES: CPT | Performed by: SOCIAL WORKER
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
## 2022-06-29 ENCOUNTER — TELEPHONE (OUTPATIENT)
Dept: PSYCHIATRY | Facility: CLINIC | Age: 42
End: 2022-06-29

## 2022-07-25 ENCOUNTER — TELEPHONE (OUTPATIENT)
Dept: PSYCHIATRY | Facility: CLINIC | Age: 42
End: 2022-07-25

## 2022-07-25 NOTE — TELEPHONE ENCOUNTER
Writer left a message explaining her appt on 7/28/22 with Precious Roa will be cx due to provider will be out of the office, follow-up 8/10/22  Thank you

## 2022-08-10 ENCOUNTER — SOCIAL WORK (OUTPATIENT)
Dept: BEHAVIORAL/MENTAL HEALTH CLINIC | Facility: CLINIC | Age: 42
End: 2022-08-10
Payer: COMMERCIAL

## 2022-08-10 DIAGNOSIS — F10.10 ALCOHOL ABUSE: Primary | ICD-10-CM

## 2022-08-10 DIAGNOSIS — F41.1 GAD (GENERALIZED ANXIETY DISORDER): ICD-10-CM

## 2022-08-10 DIAGNOSIS — F33.1 MODERATE EPISODE OF RECURRENT MAJOR DEPRESSIVE DISORDER (HCC): ICD-10-CM

## 2022-08-10 PROCEDURE — 90834 PSYTX W PT 45 MINUTES: CPT | Performed by: SOCIAL WORKER

## 2022-08-10 NOTE — PSYCH
Psychotherapy Provided: Individual Psychotherapy 45 minutes     Length of time in session: 45 minutes, follow up in 2 week        Goals addressed in session: Goal 1, Goal 2 and Goal 3      Pain:      moderate to severe    0    Current suicide risk : Low     Data:    Behavioral Health Treatment Plan [facility]: Diagnosis and Treatment Plan explained to Bonitalois Shahs relates understanding diagnosis and is agreeable to Treatment Plan   Yes

## 2022-08-29 ENCOUNTER — TELEPHONE (OUTPATIENT)
Dept: RHEUMATOLOGY | Facility: CLINIC | Age: 42
End: 2022-08-29

## 2022-08-29 NOTE — TELEPHONE ENCOUNTER
Left message for patient to call and reschedule appt for 08/31/22 at 245 pm due to not completing blood work or ultrasound prior to appt  Asked patient to please call our office back to do so

## 2022-09-26 ENCOUNTER — OFFICE VISIT (OUTPATIENT)
Dept: FAMILY MEDICINE CLINIC | Facility: CLINIC | Age: 42
End: 2022-09-26
Payer: COMMERCIAL

## 2022-09-26 VITALS
TEMPERATURE: 97.8 F | SYSTOLIC BLOOD PRESSURE: 120 MMHG | DIASTOLIC BLOOD PRESSURE: 82 MMHG | WEIGHT: 141 LBS | HEIGHT: 65 IN | BODY MASS INDEX: 23.49 KG/M2 | OXYGEN SATURATION: 98 % | HEART RATE: 68 BPM | RESPIRATION RATE: 14 BRPM

## 2022-09-26 DIAGNOSIS — R05.3 POST-COVID CHRONIC COUGH: ICD-10-CM

## 2022-09-26 DIAGNOSIS — U09.9 POST-COVID CHRONIC COUGH: ICD-10-CM

## 2022-09-26 DIAGNOSIS — F41.1 GAD (GENERALIZED ANXIETY DISORDER): Primary | ICD-10-CM

## 2022-09-26 DIAGNOSIS — H01.006 BLEPHARITIS OF EYELID OF LEFT EYE, UNSPECIFIED EYELID, UNSPECIFIED TYPE: ICD-10-CM

## 2022-09-26 DIAGNOSIS — G43.909 MIGRAINE WITHOUT STATUS MIGRAINOSUS, NOT INTRACTABLE, UNSPECIFIED MIGRAINE TYPE: ICD-10-CM

## 2022-09-26 PROCEDURE — 99214 OFFICE O/P EST MOD 30 MIN: CPT | Performed by: FAMILY MEDICINE

## 2022-09-26 NOTE — PROGRESS NOTES
Name: Dany Stafford      : 1980      MRN: [id number]  Encounter Provider: Octavio Acuna MD  Encounter Date: 2022   Encounter department: [facility]     1  MARLIN (generalized anxiety disorder)  Assessment & Plan:  Noticeable improvement  Not currently on Paxil and is seeing her therapist regularly  Does plan to switch  Recommend [employer]         2  Migraine without status migrainosus, not intractable, unspecified migraine type  Assessment & Plan:  Had a migraine yesterday but has since resolved      3  Blepharitis of eyelid of left eye, unspecified eyelid, unspecified type  Assessment & Plan:  History recurrent blepharitis  Has erythromycin eyedrops for p r n  use      4  Post-COVID chronic cough  Assessment & Plan:  Resolved             Subjective      Here for follow-up  Doing well  Mood has been stable  Sees a therapist regularly  Had a migraine yesterday but has since resolved  No concerns  Review of Systems   Neurological: Positive for headaches (occassionally )  Psychiatric/Behavioral: Negative for agitation and behavioral problems  The patient is not nervous/anxious  Current Outpatient Medications on File Prior to Visit   Medication Sig    erythromycin (ILOTYCIN) ophthalmic ointment Administer 0 5 inches into the left eye daily at bedtime (Patient taking differently: Administer 0 5 inches into the left eye daily at bedtime As needed)    naproxen (Naprosyn) 500 mg tablet Take 1 tablet (500 mg total) by mouth 2 (two) times a day with meals for 14 days (Patient taking differently: Take 500 mg by mouth if needed)    [DISCONTINUED] benzonatate (TESSALON) 200 MG capsule Take 1 capsule (200 mg total) by mouth 3 (three) times a day as needed for cough (Patient not taking: Reported on 2022)    [DISCONTINUED] fluticasone (FLOVENT HFA) 110 MCG/ACT inhaler Inhale 2 puffs 2 (two) times a day Rinse mouth after use   (Patient not taking: Reported on 9/26/2022)    [DISCONTINUED] PARoxetine (PAXIL-CR) 25 mg 24 hr tablet TAKE 1 TABLET BY MOUTH EVERY DAY IN THE MORNING (Patient not taking: Reported on 9/26/2022)       Objective     /82 (BP Location: Left arm, Patient Position: Sitting, Cuff Size: Standard)   Pulse 68   Temp 97 8 °F (36 6 °C) (Temporal)   Resp 14   Ht 5' 5" (1 651 m)   Wt 64 kg (141 lb)   SpO2 98%   BMI 23 46 kg/m²     Physical Exam  Vitals reviewed  Constitutional:       General: She is not in acute distress  Appearance: Normal appearance  She is not ill-appearing or toxic-appearing  HENT:      Head: Normocephalic and atraumatic  Eyes:      Extraocular Movements: Extraocular movements intact  Cardiovascular:      Rate and Rhythm: Normal rate and regular rhythm  Heart sounds: No murmur heard  Pulmonary:      Effort: Pulmonary effort is normal  No respiratory distress  Breath sounds: Normal breath sounds  No stridor  No wheezing, rhonchi or rales  Abdominal:      General: Abdomen is flat  There is no distension  Palpations: There is no mass  Tenderness: There is no abdominal tenderness  There is no guarding or rebound  Hernia: No hernia is present  Musculoskeletal:      Right lower leg: No edema  Left lower leg: No edema  Skin:     General: Skin is warm  Neurological:      Mental Status: She is alert and oriented to person, place, and time     Psychiatric:         Mood and Affect: Mood normal          Behavior: Behavior normal        Jazzy Guaman MD

## 2022-09-26 NOTE — ASSESSMENT & PLAN NOTE
Noticeable improvement  Not currently on Paxil and is seeing her therapist regularly  Does plan to switch  Recommend [facility] 

## 2022-10-10 ENCOUNTER — TELEPHONE (OUTPATIENT)
Dept: PSYCHIATRY | Facility: CLINIC | Age: 42
End: 2022-10-10

## 2022-10-10 NOTE — TELEPHONE ENCOUNTER
Writer left a voice message to inform the pt the appt on 10/20/22 with Jo Ann Sewell will be cx due to the provider will be on vacation follow-up 11/3/22   Thank you

## 2022-10-12 PROBLEM — Z01.419 VISIT FOR PELVIC EXAM: Status: RESOLVED | Noted: 2021-02-24 | Resolved: 2022-10-12

## 2022-11-21 ENCOUNTER — TELEPHONE (OUTPATIENT)
Dept: PSYCHIATRY | Facility: CLINIC | Age: 42
End: 2022-11-21

## 2022-11-21 NOTE — TELEPHONE ENCOUNTER
Writer left a voice message to inform the patient the appt on 11/25/22 with Sammie Altamirano will be cx due to provider is out of the office, there are no follow up scheduled at this time writer asked pt to call back to reschedule additional appts, thank you

## 2022-12-23 ENCOUNTER — APPOINTMENT (EMERGENCY)
Dept: RADIOLOGY | Facility: HOSPITAL | Age: 42
End: 2022-12-23

## 2022-12-23 ENCOUNTER — HOSPITAL ENCOUNTER (INPATIENT)
Facility: HOSPITAL | Age: 42
LOS: 1 days | Discharge: HOME | End: 2022-12-24
Attending: EMERGENCY MEDICINE | Admitting: HOSPITALIST

## 2022-12-23 DIAGNOSIS — R55 SYNCOPE: ICD-10-CM

## 2022-12-23 DIAGNOSIS — I48.91 ATRIAL FIBRILLATION, UNSPECIFIED TYPE (HCC): Primary | ICD-10-CM

## 2022-12-23 DIAGNOSIS — I48.91 ATRIAL FIBRILLATION WITH RAPID VENTRICULAR RESPONSE (HCC): ICD-10-CM

## 2022-12-23 LAB
2HR DELTA HS TROPONIN: 11 NG/L
4HR DELTA HS TROPONIN: 20 NG/L
ALBUMIN SERPL BCP-MCNC: 4.2 G/DL (ref 3.5–5)
ALP SERPL-CCNC: 50 U/L (ref 34–104)
ALT SERPL W P-5'-P-CCNC: 13 U/L (ref 7–52)
ANION GAP SERPL CALCULATED.3IONS-SCNC: 10 MMOL/L (ref 4–13)
AST SERPL W P-5'-P-CCNC: 20 U/L (ref 13–39)
BASOPHILS # BLD AUTO: 0.05 THOUSANDS/ÂΜL (ref 0–0.1)
BASOPHILS NFR BLD AUTO: 1 % (ref 0–1)
BILIRUB SERPL-MCNC: 0.59 MG/DL (ref 0.2–1)
BUN SERPL-MCNC: 16 MG/DL (ref 5–25)
CALCIUM SERPL-MCNC: 9.3 MG/DL (ref 8.4–10.2)
CARDIAC TROPONIN I PNL SERPL HS: 17 NG/L
CARDIAC TROPONIN I PNL SERPL HS: 26 NG/L
CARDIAC TROPONIN I PNL SERPL HS: 6 NG/L
CHLORIDE SERPL-SCNC: 105 MMOL/L (ref 96–108)
CO2 SERPL-SCNC: 22 MMOL/L (ref 21–32)
CREAT SERPL-MCNC: 0.69 MG/DL (ref 0.6–1.3)
EOSINOPHIL # BLD AUTO: 0.05 THOUSAND/ÂΜL (ref 0–0.61)
EOSINOPHIL NFR BLD AUTO: 1 % (ref 0–6)
ERYTHROCYTE [DISTWIDTH] IN BLOOD BY AUTOMATED COUNT: 11 % (ref 11.6–15.1)
GFR SERPL CREATININE-BSD FRML MDRD: 106 ML/MIN/1.73SQ M
GLUCOSE SERPL-MCNC: 154 MG/DL (ref 65–140)
GLUCOSE SERPL-MCNC: 159 MG/DL (ref 65–140)
HCT VFR BLD AUTO: 43 % (ref 34.8–46.1)
HGB BLD-MCNC: 15.2 G/DL (ref 11.5–15.4)
IMM GRANULOCYTES # BLD AUTO: 0.02 THOUSAND/UL (ref 0–0.2)
IMM GRANULOCYTES NFR BLD AUTO: 0 % (ref 0–2)
LYMPHOCYTES # BLD AUTO: 1.45 THOUSANDS/ÂΜL (ref 0.6–4.47)
LYMPHOCYTES NFR BLD AUTO: 25 % (ref 14–44)
MCH RBC QN AUTO: 32.1 PG (ref 26.8–34.3)
MCHC RBC AUTO-ENTMCNC: 35.3 G/DL (ref 31.4–37.4)
MCV RBC AUTO: 91 FL (ref 82–98)
MONOCYTES # BLD AUTO: 0.33 THOUSAND/ÂΜL (ref 0.17–1.22)
MONOCYTES NFR BLD AUTO: 6 % (ref 4–12)
NEUTROPHILS # BLD AUTO: 3.95 THOUSANDS/ÂΜL (ref 1.85–7.62)
NEUTS SEG NFR BLD AUTO: 67 % (ref 43–75)
NRBC BLD AUTO-RTO: 0 /100 WBCS
PLATELET # BLD AUTO: 218 THOUSANDS/UL (ref 149–390)
PLATELET # BLD AUTO: 230 THOUSANDS/UL (ref 149–390)
PMV BLD AUTO: 10.7 FL (ref 8.9–12.7)
PMV BLD AUTO: 11 FL (ref 8.9–12.7)
POTASSIUM SERPL-SCNC: 3.7 MMOL/L (ref 3.5–5.3)
PROT SERPL-MCNC: 6.9 G/DL (ref 6.4–8.4)
RBC # BLD AUTO: 4.73 MILLION/UL (ref 3.81–5.12)
SODIUM SERPL-SCNC: 137 MMOL/L (ref 135–147)
T4 FREE SERPL-MCNC: 1.04 NG/DL (ref 0.76–1.46)
T4 FREE SERPL-MCNC: 1.05 NG/DL (ref 0.76–1.46)
TSH SERPL DL<=0.05 MIU/L-ACNC: 0.24 UIU/ML (ref 0.45–4.5)
TSH SERPL DL<=0.05 MIU/L-ACNC: 0.39 UIU/ML (ref 0.45–4.5)
WBC # BLD AUTO: 5.85 THOUSAND/UL (ref 4.31–10.16)

## 2022-12-23 RX ORDER — ONDANSETRON 2 MG/ML
4 INJECTION INTRAMUSCULAR; INTRAVENOUS ONCE
Status: DISCONTINUED | OUTPATIENT
Start: 2022-12-23 | End: 2022-12-24 | Stop reason: HOSPADM

## 2022-12-23 RX ORDER — ACETAMINOPHEN 325 MG/1
650 TABLET ORAL EVERY 6 HOURS PRN
Status: DISCONTINUED | OUTPATIENT
Start: 2022-12-23 | End: 2022-12-24 | Stop reason: HOSPADM

## 2022-12-23 RX ORDER — CALCIUM GLUCONATE 20 MG/ML
1 INJECTION, SOLUTION INTRAVENOUS ONCE
Status: COMPLETED | OUTPATIENT
Start: 2022-12-23 | End: 2022-12-23

## 2022-12-23 RX ORDER — SENNOSIDES 8.6 MG
1 TABLET ORAL DAILY
Status: DISCONTINUED | OUTPATIENT
Start: 2022-12-23 | End: 2022-12-24 | Stop reason: HOSPADM

## 2022-12-23 RX ORDER — DILTIAZEM HYDROCHLORIDE 5 MG/ML
0.25 INJECTION INTRAVENOUS ONCE
Status: COMPLETED | OUTPATIENT
Start: 2022-12-23 | End: 2022-12-23

## 2022-12-23 RX ORDER — ENOXAPARIN SODIUM 100 MG/ML
40 INJECTION SUBCUTANEOUS DAILY
Status: DISCONTINUED | OUTPATIENT
Start: 2022-12-23 | End: 2022-12-24 | Stop reason: HOSPADM

## 2022-12-23 RX ADMIN — CALCIUM GLUCONATE 1 G: 20 INJECTION, SOLUTION INTRAVENOUS at 09:50

## 2022-12-23 RX ADMIN — DILTIAZEM HYDROCHLORIDE 4.5 MG/HR: 5 INJECTION, SOLUTION INTRAVENOUS at 16:15

## 2022-12-23 RX ADMIN — DILTIAZEM HYDROCHLORIDE 2 MG/HR: 5 INJECTION, SOLUTION INTRAVENOUS at 11:42

## 2022-12-23 RX ADMIN — DILTIAZEM HYDROCHLORIDE 15 MG: 5 INJECTION INTRAVENOUS at 10:02

## 2022-12-23 RX ADMIN — SODIUM CHLORIDE, POTASSIUM CHLORIDE, SODIUM LACTATE AND CALCIUM CHLORIDE 1000 ML: 600; 310; 30; 20 INJECTION, SOLUTION INTRAVENOUS at 09:20

## 2022-12-23 RX ADMIN — ENOXAPARIN SODIUM 40 MG: 40 INJECTION SUBCUTANEOUS at 14:17

## 2022-12-23 NOTE — ED ATTENDING ATTESTATION
12/23/2022  IAgnes DO, saw and evaluated the patient  I have discussed the patient with the resident/non-physician practitioner and agree with the resident's/non-physician practitioner's findings, Plan of Care, and MDM as documented in the resident's/non-physician practitioner's note, except where noted  All available labs and Radiology studies were reviewed  I was present for key portions of any procedure(s) performed by the resident/non-physician practitioner and I was immediately available to provide assistance  At this point I agree with the current assessment done in the Emergency Department  I have conducted an independent evaluation of this patient a history and physical is as follows:    Patient is a 40-year-old female with a history of migraines who presents with syncope and palpitations  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 and lightheadedness  She felt as though she was going to pass out and lowered herself to the ground  She did lose consciousness and awoke to her coworkers standing around her  She returned to baseline rather quickly  However she continues to complain of intermittent lightheadedness and palpitations  She denies chest pain or shortness of breath  She has had intermittent palpitations in the past   She denies any changes in medications  She did drink more caffeine yesterday than usual   No significant alcohol use  No drug use  On exam, patient is in no acute distress  Heart is tachycardic and irregularly irregular  Breath sounds normal   Abdomen is soft, nontender, nondistended  No rebound or guarding  No lower extremity edema or calf tenderness  EKG reveals atrial fibrillation with rapid ventricular response  This is a new diagnosis for patient  She was given diltiazem bolus followed by diltiazem infusion  Will hospitalize for further work-up and treatment      Portions of the above record have been created with voice recognition software  Occasional wrong word or "sound alike" substitutions may have occurred due to the inherent limitations of voice recognition software  Read the chart carefully and recognize, using context, where substitutions may have occurred  ED Course         Critical Care Time  CriticalCare Time  Performed by: Luisa Ospina DO  Authorized by: Luisa Ospina DO     Critical care provider statement:     Critical care start time:  12/23/2022 10:00 AM    Critical care end time:  12/23/2022 10:35 AM    Critical care time was exclusive of:  Separately billable procedures and treating other patients    Critical care was necessary to treat or prevent imminent or life-threatening deterioration of the following conditions: Rapid atrial fibrillation with RVR      Critical care was time spent personally by me on the following activities:  Blood draw for specimens, obtaining history from patient or surrogate, development of treatment plan with patient or surrogate, discussions with consultants, evaluation of patient's response to treatment, examination of patient, interpretation of cardiac output measurements, ordering and performing treatments and interventions, ordering and review of laboratory studies, ordering and review of radiographic studies, re-evaluation of patient's condition and review of old charts

## 2022-12-23 NOTE — NURSING NOTE
Patient converted to NSR at approximately 1600  EKG obtained and sent to Dr Bia Rowe via [facility]

## 2022-12-23 NOTE — ASSESSMENT & PLAN NOTE
Patient syncopized while at work this morning  Noted to have A  fib with rapid ventricular response in the ED  Heart rate down to the 100s with Cardizem however blood pressure soft   [id number] score of 1-female sex  Has noted intermittent episodes of palpitations since young age  Had 3 cups of coffee yesterday instead of usual 1  And 1 glass of wine  Currently on a Cardizem drip which is on hold secondary to systolic blood pressure  No symptoms currently

## 2022-12-23 NOTE — ED PROVIDER NOTES
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) to go to the emergency department for continued evaluation  Evaluation in the emergency department, patient states that she still feels that her heart is beating irregularly, experiencing intermittent/transitory episodes of lightheadedness  She denies any numbness, paresthesias, chest pain, shortness of breath, abdominal pain, nausea, diaphoresis, urinary incontinence, or bowel incontinence  Patient states that she has not been previously diagnosed with any cardiac history in the past       History provided by:  Patient and spouse   used: No    Atrial Fibrillation  Location: At work  Quality:  Heart palpitations, tunnel vision,  Severity:  Mild  Onset quality:  Sudden  Duration:  20 minutes  Timing:  Constant  Progression:  Partially resolved  Chronicity:  Recurrent  Context:  Was at work, sudden onset of narrowing of vision, lowered herself to the ground, transient loss of consciousness before coming to, instructed by coworkers to come to the emergency department    Relieved by:  Rest  Worsened by:  Exertion  Associated symptoms: no abdominal pain, no chest pain, no congestion, no cough, no diarrhea, no ear pain, no fatigue, no fever, no headaches, no loss of consciousness, no myalgias, no nausea, no rash, no rhinorrhea, no shortness of breath, no sore throat, no vomiting and no wheezing        Prior to Admission Medications   Prescriptions Last Dose Informant Patient Reported? Taking?   erythromycin (ILOTYCIN) ophthalmic ointment Not Taking  No No   Sig: Administer 0 5 inches into the left eye daily at bedtime   Patient not taking: Reported on 12/23/2022   naproxen (Naprosyn) 500 mg tablet   No No   Sig: Take 1 tablet (500 mg total) by mouth 2 (two) times a day with meals for 14 days   Patient taking differently: Take 500 mg by mouth if needed      Facility-Administered Medications: None       Past Medical History:   Diagnosis Date   • COVID-19    • Diverticulosis    • Hydronephrosis    • Migraine    • PONV (postoperative nausea and vomiting)    • TMJ arthralgia     Resolved 10/29/2015    • Ureteral stricture        Past Surgical History:   Procedure Laterality Date   • APPENDECTOMY     • CYSTOSCOPY W/ LASER LITHOTRIPSY Right 11/2/2017    Procedure: CYSTOSCOPY;  RIGHT URETEROSCOPY WITH  HOLMIUM LASER INCISION OF URETERAL STRICTURE; (WITH HYDROSTATIC RIGHT URETERAL DILATION), BILATERAL RETROGRADE PYELOGRAM AND INSERTION OF RIGHT URETERAL STENT X 2 (4 7 X 26);   Surgeon: Alanan Donaldson MD;  [facility];  Service: Urology   • DILATION AND CURETTAGE OF UTERUS N/A 12/30/2016    Procedure: DILATATION AND CURETTAGE;  Surgeon: Ariadna Grimaldo MD;  [facility];  Service:    • DILATION AND EVACUATION     • HYSTERECTOMY N/A 5/22/2017    Procedure: EXAM UNDER ANESTHESIA;  Surgeon: Ariadna Grimaldo MD;  [facility];  Service:    • HYSTEROSCOPY N/A 12/30/2016    Procedure: HYSTEROSCOPY;  Surgeon: Ariadna Grimaldo MD;  [facility];  Service:    • TN CYSTO BLADDER W/URETERAL CATHETERIZATION Bilateral 3/27/2017    Procedure: CYSTOSCOPY WITH RETROGRADE PYELOGRAM; RIGHT STENT INSERTION;  Surgeon: Alanna Donaldson MD;  [facility];  Service: Urology   • TN CYSTO BLADDER W/URETERAL CATHETERIZATION N/A 1/11/2018    Procedure: CYSTOSCOPY , BILATERAL RETROGRADE PYELOGRAM WITH RIGHT STENT EXTRACTION;  Surgeon: Kusum Narayanan MD;  [facility];  Service: Urology   • AZ CYSTO BLADDER W/URETERAL CATHETERIZATION Right 1/15/2018    Procedure: CYSTOSCOPY, RIGHT RETROGRADE PYELOGRAM WITH INSERTION OF RIGHT STENT URETERAL;  Surgeon: Kusum Narayanan MD;  [facility];  Service: Urology   • AZ LAPAROSCOPY W TOTAL HYSTERECTOMY UTERUS 250 GM/< N/A 3/24/2017    Procedure: TOTAL LAPAROSCOPIC HYSTERECTOMY   bilateral salpingectomy, cysto; Surgeon: Marcio Dutta MD;  [facility];  Service: Gynecology   • REIMPLANT URETER IN BLADDER Right 2/22/2018    Procedure: URETERAL NEOCYSTOSTOMY, EXCISION OF URETERAL STRICTURE ;  Surgeon: Kusum Narayanan MD;  [facility];  Service: Urology   • URETERAL STENT PLACEMENT Right 2/22/2018    Procedure: INSERTION STENT URETERAL;  Surgeon: Kusum Narayanan MD;  [facility];  Service: Urology       Family History   Problem Relation Age of Onset   • Heart attack Mother    • Hypertension Mother    • Colon cancer Brother    • Diabetes type II Paternal Grandfather      I have reviewed and agree with the history as documented  E-Cigarette/Vaping   • E-Cigarette Use Never User      E-Cigarette/Vaping Substances   • Nicotine No    • THC No    • CBD No    • Flavoring No    • Other No    • Unknown No      Social History     Tobacco Use   • Smoking status: Former     Packs/day: 0 20     Years: 12 00     Pack years: 2 40     Types: Cigarettes     Quit date: 2007     Years since quitting: 15 9   • Smokeless tobacco: Never   • Tobacco comments:     quit 13 years ago   Vaping Use   • Vaping Use: Never used   Substance Use Topics   • Alcohol use: Yes     Alcohol/week: 2 0 standard drinks     Types: 2 Glasses of wine per week   • Drug use: No        Review of Systems   Constitutional: Negative for chills, fatigue and fever  HENT: Negative for congestion, ear pain, rhinorrhea and sore throat  Eyes: Negative for pain and visual disturbance     Respiratory: Negative for cough, shortness of breath and wheezing  Cardiovascular: Negative for chest pain and palpitations  Gastrointestinal: Negative for abdominal pain, diarrhea, nausea and vomiting  Genitourinary: Negative for dysuria and hematuria  Musculoskeletal: Negative for arthralgias, back pain and myalgias  Skin: Negative for color change and rash  Neurological: Negative for seizures, loss of consciousness, syncope and headaches  All other systems reviewed and are negative  Physical Exam  ED Triage Vitals   Temperature Pulse Respirations Blood Pressure SpO2   12/23/22 0851 12/23/22 0851 12/23/22 0851 12/23/22 0851 12/23/22 0851   97 6 °F (36 4 °C) (!) 53 18 97/63 96 %      Temp Source Heart Rate Source Patient Position - Orthostatic VS BP Location FiO2 (%)   12/23/22 0851 12/23/22 0851 12/23/22 0851 12/23/22 0851 --   Oral Monitor Lying Right arm       Pain Score       12/23/22 1326       No Pain             Orthostatic Vital Signs  Vitals:    12/23/22 1418 12/23/22 1625 12/23/22 1626 12/23/22 1627   BP: 118/78 96/68 96/68 96/68   Pulse: 88 84 78 86   Patient Position - Orthostatic VS:           Physical Exam  Vitals and nursing note reviewed  Constitutional:       Appearance: Normal appearance  She is well-developed  She is obese  She is not ill-appearing or diaphoretic  HENT:      Head: Normocephalic and atraumatic  Right Ear: External ear normal       Left Ear: External ear normal       Nose: Nose normal  No rhinorrhea  Mouth/Throat:      Mouth: Mucous membranes are moist       Pharynx: No oropharyngeal exudate or posterior oropharyngeal erythema  Eyes:      General:         Right eye: No discharge  Left eye: No discharge  Extraocular Movements: Extraocular movements intact  Conjunctiva/sclera: Conjunctivae normal       Pupils: Pupils are equal, round, and reactive to light  Cardiovascular:      Rate and Rhythm: Regular rhythm  Tachycardia present        Pulses: Normal pulses  Heart sounds: No murmur heard  Pulmonary:      Effort: Pulmonary effort is normal  No respiratory distress  Breath sounds: Normal breath sounds  No wheezing, rhonchi or rales  Abdominal:      General: Abdomen is flat  Palpations: Abdomen is soft  Tenderness: There is no abdominal tenderness  There is no right CVA tenderness, left CVA tenderness, guarding or rebound  Musculoskeletal:         General: No swelling, deformity or signs of injury  Normal range of motion  Cervical back: Normal range of motion and neck supple  No rigidity  Right lower leg: No edema  Left lower leg: No edema  Skin:     General: Skin is warm and dry  Capillary Refill: Capillary refill takes less than 2 seconds  Findings: No bruising or erythema  Neurological:      General: No focal deficit present  Mental Status: She is alert and oriented to person, place, and time  Cranial Nerves: No cranial nerve deficit  Motor: No weakness        Coordination: Coordination normal    Psychiatric:         Mood and Affect: Mood normal          ED Medications  Medications   ondansetron (ZOFRAN) injection 4 mg (4 mg Intravenous Not Given 12/23/22 0922)   lactated ringers bolus 1,000 mL (0 mL Intravenous Hold 12/23/22 1352)   acetaminophen (TYLENOL) tablet 650 mg (has no administration in time range)   senna (SENOKOT) tablet 8 6 mg (8 6 mg Oral Refused 12/23/22 1342)   enoxaparin (LOVENOX) subcutaneous injection 40 mg (40 mg Subcutaneous Given 12/23/22 1417)   lactated ringers bolus 1,000 mL (0 mL Intravenous Stopped 12/23/22 1141)   diltiazem (CARDIZEM) injection 15 mg (15 mg Intravenous Given 12/23/22 1002)   calcium gluconate 1 g in sodium chloride 0 9% 50 mL (premix) (0 g Intravenous Stopped 12/23/22 1020)   diltiazem (CARDIZEM) 125 mg in sodium chloride 0 9 % 125 mL infusion (7 mg/hr Intravenous Rate/Dose Change 12/23/22 1319)   diltiazem (CARDIZEM) 125 mg in sodium chloride 0 9 % 125 mL infusion (0 mg/hr Intravenous Stopped 12/23/22 1836)       Diagnostic Studies  Results Reviewed     Procedure Component Value Units Date/Time    T4, free [[id number]]  (Normal) Collected: 12/23/22 0919    Lab Status: Final result Specimen: Blood from Arm, Left Updated: 12/23/22 1648     Free T4 1 05 ng/dL     HS Troponin I 4hr [[id number]]  (Abnormal) Collected: 12/23/22 1410    Lab Status: Final result Specimen: Blood from Arm, Right Updated: 12/23/22 1504     hs TnI 4hr 26 ng/L      Delta 4hr hsTnI 20 ng/L     HS Troponin I 2hr [[id number]]  (Normal) Collected: 12/23/22 1224    Lab Status: Final result Specimen: Blood from Arm, Right Updated: 12/23/22 1259     hs TnI 2hr 17 ng/L      Delta 2hr hsTnI 11 ng/L     TSH, 3rd generation with Free T4 reflex [[id number]]  (Abnormal) Collected: 12/23/22 0919    Lab Status: Final result Specimen: Blood from Arm, Left Updated: 12/23/22 1114     TSH 3RD GENERATON 0 391 uIU/mL     Narrative:      Patients undergoing fluorescein dye angiography may retain small amounts of fluorescein in the body for 48-72 hours post procedure  Samples containing fluorescein can produce falsely depressed TSH values  If the patient had this procedure,a specimen should be resubmitted post fluorescein clearance        HS Troponin 0hr (reflex protocol) [[id number]]  (Normal) Collected: 12/23/22 0919    Lab Status: Final result Specimen: Blood from Arm, Left Updated: 12/23/22 1012     hs TnI 0hr 6 ng/L     Comprehensive metabolic panel [[id number]]  (Abnormal) Collected: 12/23/22 0919    Lab Status: Final result Specimen: Blood from Arm, Left Updated: 12/23/22 1008     Sodium 137 mmol/L      Potassium 3 7 mmol/L      Chloride 105 mmol/L      CO2 22 mmol/L      ANION GAP 10 mmol/L      BUN 16 mg/dL      Creatinine 0 69 mg/dL      Glucose 154 mg/dL      Calcium 9 3 mg/dL      AST 20 U/L      ALT 13 U/L      Alkaline Phosphatase 50 U/L      Total Protein 6 9 g/dL      Albumin 4 2 g/dL      Total Bilirubin 0 59 mg/dL      eGFR 106 ml/min/1 73sq m     Narrative:      [place] guidelines for Chronic Kidney Disease (CKD):   •  Stage 1 with normal or high GFR (GFR > 90 mL/min/1 73 square meters)  •  Stage 2 Mild CKD (GFR = 60-89 mL/min/1 73 square meters)  •  Stage 3A Moderate CKD (GFR = 45-59 mL/min/1 73 square meters)  •  Stage 3B Moderate CKD (GFR = 30-44 mL/min/1 73 square meters)  •  Stage 4 Severe CKD (GFR = 15-29 mL/min/1 73 square meters)  •  Stage 5 End Stage CKD (GFR <15 mL/min/1 73 square meters)  Note: GFR calculation is accurate only with a steady state creatinine    CBC and differential [[id number]]  (Abnormal) Collected: 12/23/22 0919    Lab Status: Final result Specimen: Blood from Arm, Left Updated: 12/23/22 0943     WBC 5 85 Thousand/uL      RBC 4 73 Million/uL      Hemoglobin 15 2 g/dL      Hematocrit 43 0 %      MCV 91 fL      MCH 32 1 pg      MCHC 35 3 g/dL      RDW 11 0 %      MPV 11 0 fL      Platelets 939 Thousands/uL      nRBC 0 /100 WBCs      Neutrophils Relative 67 %      Immat GRANS % 0 %      Lymphocytes Relative 25 %      Monocytes Relative 6 %      Eosinophils Relative 1 %      Basophils Relative 1 %      Neutrophils Absolute 3 95 Thousands/µL      Immature Grans Absolute 0 02 Thousand/uL      Lymphocytes Absolute 1 45 Thousands/µL      Monocytes Absolute 0 33 Thousand/µL      Eosinophils Absolute 0 05 Thousand/µL      Basophils Absolute 0 05 Thousands/µL     Fingerstick Glucose (POCT) [[id number]]  (Abnormal) Collected: 12/23/22 0849    Lab Status: Final result Updated: 12/23/22 0852     POC Glucose 159 mg/dl                  XR chest 1 view portable   Final Result by Geraldine Barragan MD (12/23 1220)      No acute cardiopulmonary disease                    Workstation performed: [id number]               Procedures  ECG 12 Lead Documentation Only    Date/Time: 12/23/2022 9:40 AM  Performed by: Erik Dailey MD  Authorized by: Cehrrie Phillips Rain Sutton MD     Patient location:  ED  Previous ECG:     Previous ECG:  Unavailable    Comparison to cardiac monitor: Yes    Interpretation:     Interpretation: normal    Quality:     Tracing quality:  Limited by artifact  Rate:     ECG rate:  163    ECG rate assessment: tachycardic    Rhythm:     Rhythm: atrial fibrillation    Ectopy:     Ectopy: none    QRS:     QRS axis:  Normal    QRS intervals:  Normal  Conduction:     Conduction: normal    ST segments:     ST segments:  Normal  T waves:     T waves: normal            ED Course                             SBIRT 20yo+    Flowsheet Row Most Recent Value   SBIRT (25 yo +)    In order to provide better care to our patients, we are screening all of our patients for alcohol and drug use  Would it be okay to ask you these screening questions? No Filed at: 12/23/2022 5170                MDM  Number of Diagnoses or Management Options  Atrial fibrillation, unspecified type [facility]): new and requires workup  Syncope: new and requires workup  Diagnosis management comments: Perry Cao comes the emergency department with signs and symptoms with new onset atrial fibrillation with rapid ventricular response  ECG tracing is consistent with this pathology  Patient is noted to be mildly hypotensive but the patient does describe that she normally "runs low" when her blood pressure is recorded  Patient is having transitory episodes of dizziness while in the emergency department  DDx including but not limited to: metabolic abnormality, cardiac arrhythmia, ACS, MI,  thyroid disease, PE, anxiety, adverse reaction  Based off of signs and symptoms as well as laboratory evaluation in the emergency department, lower concern for metabolic abnormality, ACS, MI  Lower concern for thyroid disease based off of TSH evaluation, lack of diarrheal symptoms, or changes in her hair/hair loss  Lower suspicion for PE    Patient does state that she feels anxious but lower suspicion as this is better explained by cardiac arrhythmia (atrial fibrillation)  Patient has not had any changes in medications  Based off of reevaluation with her blood pressure, patient was provided with gentle fluid hydration and was noted to have a moderate improvement in her blood pressure  Patient was provided with an initial bolus of calcium as review of literature with regards to the potential for mitigation of blood pressure with anticipated Cardizem bolus for rate control  Patient was provided with an initial bolus of Cardizem and had an appropriate control in her heart rate so Cardizem drip was held  Patient is noted to have heart rates between the 80s-90s, but was still noted to be irregular  Due to the new onset nature of her atrial fibrillation and the desire for further work-up (cardiac echo, discussions on anticoagulation, discussions on rate control medication) was discussed with the patient that would be reasonable for her to be admitted to the hospital for continued cardiac work-up  Patient expressed displeasure secondary to her being so close to Audra time, but she did express understanding that this was in her best interest for continued evaluation of her symptoms  This case was discussed with the inpatient medical team who agreed with inpatient admission for continued cardiac work-up  Disposition: Inpatient admission for continued cardiac work-up in the setting of new onset atrial fibrillation         Amount and/or Complexity of Data Reviewed  Clinical lab tests: ordered and reviewed  Tests in the radiology section of CPT®: ordered and reviewed  Obtain history from someone other than the patient: yes  Review and summarize past medical records: yes  Discuss the patient with other providers: yes  Independent visualization of images, tracings, or specimens: yes    Risk of Complications, Morbidity, and/or Mortality  Presenting problems: moderate  Diagnostic procedures: moderate  Management options: moderate        Disposition  Final diagnoses:   Atrial fibrillation, unspecified type ([facility] [address] )   Syncope     Time reflects when diagnosis was documented in both MDM as applicable and the Disposition within this note     Time User Action Codes Description Comment    12/23/2022 10:55 AM Myla Munoz Add [I48 91] Atrial fibrillation, unspecified type ([facility] [address] )     12/23/2022 10:55 AM Myla Munoz Add [R55] Syncope       ED Disposition     ED Disposition   Admit    Condition   Stable    Date/Time   Fri Dec 23, 2022 10:54 AM    Comment   Case was discussed with Dr Robert Espinosa and the patient's admission status was agreed to be Admission Status: inpatient status to the service of Dr Robert Espinosa             Follow-up Information    None         Current Discharge Medication List      CONTINUE these medications which have NOT CHANGED    Details   erythromycin (ILOTYCIN) ophthalmic ointment Administer 0 5 inches into the left eye daily at bedtime  Qty: 3 5 g, Refills: 1    Associated Diagnoses: Blepharitis of eyelid of left eye, unspecified eyelid, unspecified type      naproxen (Naprosyn) 500 mg tablet Take 1 tablet (500 mg total) by mouth 2 (two) times a day with meals for 14 days  Qty: 28 tablet, Refills: 0    Associated Diagnoses: Plantar fasciitis           No discharge procedures on file  PDMP Review     None           ED Provider  Attending physically available and evaluated Waldemar Camara I managed the patient along with the ED Attending      Electronically Signed by         Bebe Agosto MD  12/23/22 2873

## 2022-12-23 NOTE — H&P
[facility]  H&P- Lulú Herrera 1980, 38 y o  female MRN: [id number]  Unit/Bed#: ED-08 Encounter: [id number]  Primary Care Provider: Sawyer Lancaster MD   Date and time admitted to hospital: 12/23/2022  8:45 AM    Atrial fibrillation with rapid ventricular response [facility])  Assessment & Plan  Patient syncopized while at work this morning  Noted to have A  fib with rapid ventricular response in the ED  Heart rate down to the 100s with Cardizem however blood pressure soft   [id number] score of 1-female sex  Has noted intermittent episodes of palpitations since young age  Had 3 cups of coffee yesterday instead of usual 1  And 1 glass of wine  Currently on a Cardizem drip which is on hold secondary to systolic blood pressure  No symptoms currently  We will consult cardiology for recommendations and management  Order echocardiogram    * Vasovagal syncope  Assessment & Plan  Syncope likely secondary to A  fib with drop in blood pressure  Resolved    MARLIN (generalized anxiety disorder)  Assessment & Plan  Generalized anxiety disorder currently not on any medications  Follows up with a therapist regularly    Migraine  Assessment & Plan  Stable  currently no exacerbations  Takes naproxen        History of Present Illness     HPI:  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  She was at work this morning when at around 8:30 AM she suddenly syncopized  She felt lightheaded and her vision went black prior to her syncopal episode  She lowered herself to the ground and apparently lost consciousness  When she awoke a minute later had coworkers standing around her  She is back to her baseline currently but complaining of intermittent episodes of lightheadedness and palpitations  She states that she has intermittent episodes of palpitations since a young age which she has ignored    She was noted to have atrial fibrillation with rapid ventricular rate and was initiated on Cardizem-heart rate came down from the 140s to around half  However her systolic blood pressure is now between 90 and 100  She states that she always has had low blood pressures  Strong family history of coronary artery disease with her grandmother and mother with heart problems  Other medical history includes generalized anxiety disorder,  Not a smoker  Trying 3 cups of coffee yesterday  Usually drinks only 1  Had 1 glass of wine yesterday  Denies any excessive use of alcohol  [id number] score of 1  Being admitted for evaluation and management        Historical Information   Past Medical History:   Diagnosis Date   • COVID-19    • Diverticulosis    • Hydronephrosis    • Migraine    • PONV (postoperative nausea and vomiting)    • TMJ arthralgia     Resolved 10/29/2015    • Ureteral stricture      Patient Active Problem List   Diagnosis   • Vaginal injury   • Ureteral stricture, right   • CVA tenderness   • Hydronephrosis   • Preop examination   • Family history of colon cancer   • PONV (postoperative nausea and vomiting)   • Migraine   • Plantar fasciitis   • Chronic pain of left ankle   • Blepharitis of eyelid of left eye   • MARLIN (generalized anxiety disorder)   • Atrial fibrillation with rapid ventricular response ([address] )   • Vasovagal syncope     Past Surgical History:   Procedure Laterality Date   • APPENDECTOMY     • CYSTOSCOPY W/ LASER LITHOTRIPSY Right 11/2/2017    Procedure: CYSTOSCOPY;  RIGHT URETEROSCOPY WITH  HOLMIUM LASER INCISION OF URETERAL STRICTURE; (WITH HYDROSTATIC RIGHT URETERAL DILATION), BILATERAL RETROGRADE PYELOGRAM AND INSERTION OF RIGHT URETERAL STENT X 2 (4 7 X 26);   Surgeon: Chiara Gardner MD;  [facility];  Service: Urology   • DILATION AND CURETTAGE OF UTERUS N/A 12/30/2016    Procedure: DILATATION AND CURETTAGE;  Surgeon: Bina Hernandes MD;  [facility];  Service:    • DILATION AND EVACUATION     • HYSTERECTOMY N/A 5/22/2017    Procedure: EXAM UNDER ANESTHESIA;  Surgeon: Leah Kline MD;  [facility];  Service:    • HYSTEROSCOPY N/A 12/30/2016    Procedure: HYSTEROSCOPY;  Surgeon: Leah Kline MD;  [facility];  Service:    • WI CYSTOURETHROSCOPY,URETER CATHETER Bilateral 3/27/2017    Procedure: CYSTOSCOPY WITH RETROGRADE PYELOGRAM; RIGHT STENT INSERTION;  Surgeon: Efren Abarca MD;  [facility];  Service: Urology   • WI CYSTOURETHROSCOPY,URETER CATHETER N/A 1/11/2018    Procedure: CYSTOSCOPY , BILATERAL RETROGRADE PYELOGRAM WITH RIGHT STENT EXTRACTION;  Surgeon: Efren Abarca MD;  [facility];  Service: Urology   • WI CYSTOURETHROSCOPY,URETER CATHETER Right 1/15/2018    Procedure: CYSTOSCOPY, RIGHT RETROGRADE PYELOGRAM WITH INSERTION OF RIGHT STENT URETERAL;  Surgeon: Efren Abarca MD;  [facility];  Service: Urology   • WI LAPAROSCOPY W TOT HYSTERECT UTERUS 250 GRAM OR LESS N/A 3/24/2017    Procedure: TOTAL LAPAROSCOPIC HYSTERECTOMY   bilateral salpingectomy, cysto;   Surgeon: Leah Kline MD;  [facility];  Service: Gynecology   • REIMPLANT URETER IN BLADDER Right 2/22/2018    Procedure: URETERAL NEOCYSTOSTOMY, EXCISION OF URETERAL STRICTURE ;  Surgeon: Efren Abarca MD;  [facility];  Service: Urology   • URETERAL STENT PLACEMENT Right 2/22/2018    Procedure: INSERTION STENT URETERAL;  Surgeon: Efren Abarca MD;  [facility];  Service: Urology       Social History   Social History     Substance and Sexual Activity   Alcohol Use Yes   • Alcohol/week: 2 0 standard drinks   • Types: 2 Glasses of wine per week     Social History     Substance and Sexual Activity   Drug Use No     Social History     Tobacco Use   Smoking Status Former   • Packs/day: 0 20   • Years: 12 00   • Pack years: 2 40   • Types: Cigarettes   • Quit date: 2007   • Years since quitting: 15 9   Smokeless Tobacco Never   Tobacco Comments    quit 13 years ago       Family History:   Family History   Problem Relation Age of Onset   • Heart attack Mother    • Hypertension Mother    • Colon cancer Brother    • Diabetes type II Paternal Grandfather        Meds/Allergies       Current Facility-Administered Medications:   •  diltiazem (CARDIZEM) 125 mg in sodium chloride 0 9 % 125 mL infusion, 1-15 mg/hr, Intravenous, Once, De Stout MD, Held at 12/23/22 1117  •  [facility] injection 4 mg, 4 mg, Intravenous, Once, De Stout MD    Current Outpatient Medications:   •  erythromycin (ILOTYCIN) ophthalmic ointment, Administer 0 5 inches into the left eye daily at bedtime (Patient taking differently: Administer 0 5 inches into the left eye daily at bedtime As needed), Disp: 3 5 g, Rfl: 1  •  naproxen (Naprosyn) 500 mg tablet, Take 1 tablet (500 mg total) by mouth 2 (two) times a day with meals for 14 days (Patient taking differently: Take 500 mg by mouth if needed), Disp: 28 tablet, Rfl: 0    Allergies   Allergen Reactions   • Shellfish-Derived Products - Food Allergy Anaphylaxis     THROAT ITCHY MIGRAINES   • Fentanyl Itching       Review of Systems  A detailed 12 point review of systems was conducted and is negative apart from those mentioned in the HPI  Objective   Vitals: Blood pressure 110/80, pulse 98, temperature 97 6 °F (36 4 °C), temperature source Oral, resp  rate 17, weight 63 6 kg (140 lb 3 4 oz), SpO2 97 %  Physical Exam   HEENT: PERRLA, EOMI, sclera anicteric, dry mucous membranes, tongue mucosa dry without lesions  Neck: supple, no JVD, lymphadenopathy, thyromegaly  Heart: iregular rate and rhythm, S1S2 present  No murmur, rub or gallop  Lungs; Clear to auscultation bilaterally  No wheezing, crackles or rhonchi  No accessory muscle use or respiratory distress  Abdomen: soft, non-tender, non-distended, NABS  No guarding or rebound  No peritoneal sound or mass  Extremities: no clubbing, cyanosis, or edema  2+ pedal pulses bilaterally    Full range of motion  Neurologic:  Cranial nerves II-XII intact  Strength and sensation globally intact  Speech fluent and goal directed  Awake, alert and oriented x 3  Skin: warm and dry  No petechiae, purpura or rash  Lab Results: I have personally reviewed pertinent reports  Results from last 7 days   Lab Units 12/23/22  0919   WBC Thousand/uL 5 85   HEMOGLOBIN g/dL 15 2   HEMATOCRIT % 43 0   PLATELETS Thousands/uL 230   NEUTROS PCT % 67   LYMPHS PCT % 25   MONOS PCT % 6   EOS PCT % 1     Results from last 7 days   Lab Units 12/23/22  0919   POTASSIUM mmol/L 3 7   CHLORIDE mmol/L 105   CO2 mmol/L 22   BUN mg/dL 16   CREATININE mg/dL 0 69   CALCIUM mg/dL 9 3   ALK PHOS U/L 50   ALT U/L 13   AST U/L 20               Imaging: X-ray chest NAD    EKG-A  fib with rapid ventricular rate  No short TX interval or evidence of any ischemia noted  Admit as inpt    Code Status: Prior      Counseling / Coordination of Care  Total floor / unit time spent today 22minutes  Greater than 50% of total time was spent with the patient and / or family counseling and / or coordination of care  Portions of the record may have been created with voice recognition software  Occasional wrong word or "sound a like" substitutions may have occurred due to the inherent limitations of voice recognition software  Read the chart carefully and recognize, using context, where substitutions have occurred

## 2022-12-23 NOTE — ASSESSMENT & PLAN NOTE
Generalized anxiety disorder currently not on any medications  Follows up with a therapist regularly

## 2022-12-24 ENCOUNTER — APPOINTMENT (INPATIENT)
Dept: NON INVASIVE DIAGNOSTICS | Facility: HOSPITAL | Age: 42
End: 2022-12-24

## 2022-12-24 VITALS
TEMPERATURE: 97.8 F | OXYGEN SATURATION: 95 % | RESPIRATION RATE: 18 BRPM | SYSTOLIC BLOOD PRESSURE: 106 MMHG | BODY MASS INDEX: 23.32 KG/M2 | WEIGHT: 140 LBS | HEART RATE: 70 BPM | HEIGHT: 65 IN | DIASTOLIC BLOOD PRESSURE: 67 MMHG

## 2022-12-24 LAB
AORTIC ROOT: 3.3 CM
APICAL FOUR CHAMBER EJECTION FRACTION: 65 %
ATRIAL RATE: 122 BPM
ATRIAL RATE: 75 BPM
ATRIAL RATE: 79 BPM
E WAVE DECELERATION TIME: 224 MS
FRACTIONAL SHORTENING: 26 (ref 28–44)
INTERVENTRICULAR SEPTUM IN DIASTOLE (PARASTERNAL SHORT AXIS VIEW): 1.1 CM
INTERVENTRICULAR SEPTUM: 1.1 CM (ref 0.6–1.1)
LAAS-AP2: 7.6 CM2
LAAS-AP4: 13 CM2
LEFT ATRIUM SIZE: 2.2 CM
LEFT INTERNAL DIMENSION IN SYSTOLE: 3.1 CM (ref 2.1–4)
LEFT VENTRICULAR INTERNAL DIMENSION IN DIASTOLE: 4.2 CM (ref 3.5–6)
LEFT VENTRICULAR POSTERIOR WALL IN END DIASTOLE: 1.1 CM
LEFT VENTRICULAR STROKE VOLUME: 41 ML
LVSV (TEICH): 41 ML
MV E'TISSUE VEL-SEP: 11 CM/S
MV PEAK A VEL: 0.55 M/S
MV PEAK E VEL: 66 CM/S
MV STENOSIS PRESSURE HALF TIME: 65 MS
MV VALVE AREA P 1/2 METHOD: 3.38
P AXIS: 116 DEGREES
PR INTERVAL: 162 MS
PR INTERVAL: 168 MS
QRS AXIS: 104 DEGREES
QRS AXIS: 104 DEGREES
QRS AXIS: 78 DEGREES
QRSD INTERVAL: 84 MS
QRSD INTERVAL: 92 MS
QRSD INTERVAL: 92 MS
QT INTERVAL: 286 MS
QT INTERVAL: 384 MS
QT INTERVAL: 386 MS
QTC INTERVAL: 431 MS
QTC INTERVAL: 440 MS
QTC INTERVAL: 470 MS
RIGHT ATRIAL 2D VOLUME: 16 ML
RIGHT ATRIUM AREA SYSTOLE A4C: 8 CM2
RIGHT VENTRICLE ID DIMENSION: 2.3 CM
SL CV LEFT ATRIUM LENGTH A2C: 2.9 CM
SL CV LV EF: 55
SL CV PED ECHO LEFT VENTRICLE DIASTOLIC VOLUME (MOD BIPLANE) 2D: 79 ML
SL CV PED ECHO LEFT VENTRICLE SYSTOLIC VOLUME (MOD BIPLANE) 2D: 38 ML
T WAVE AXIS: 105 DEGREES
T WAVE AXIS: 108 DEGREES
T WAVE AXIS: 74 DEGREES
VENTRICULAR RATE: 163 BPM
VENTRICULAR RATE: 75 BPM
VENTRICULAR RATE: 79 BPM

## 2022-12-24 RX ORDER — DILTIAZEM HYDROCHLORIDE 120 MG/1
120 CAPSULE, COATED, EXTENDED RELEASE ORAL DAILY
Qty: 30 CAPSULE | Refills: 0 | Status: SHIPPED | OUTPATIENT
Start: 2022-12-24 | End: 2023-01-23

## 2022-12-24 RX ORDER — DILTIAZEM HYDROCHLORIDE 120 MG/1
120 CAPSULE, COATED, EXTENDED RELEASE ORAL DAILY
Status: DISCONTINUED | OUTPATIENT
Start: 2022-12-24 | End: 2022-12-24 | Stop reason: HOSPADM

## 2022-12-24 NOTE — DISCHARGE INSTR - AVS FIRST PAGE
Dear Ronal Middleton,     It was our pleasure to care for you here at [facility]  It is our hope that we were always able to exceed the expected standards for your care during your stay  You were hospitalized due to syncope  You were cared for on the fourth floor by Escobar Juarez MD under the service of Kerrie Gastelum MD with the [facility] who covers for your primary care physician (PCP), Atiya Wood MD, while you were hospitalized  If you have any questions or concerns related to this hospitalization, you may contact us at [phone]  For follow up as well as any medication refills, we recommend that you follow up with your primary care physician  A registered nurse will reach out to you by phone within a few days after your discharge to answer any additional questions that you may have after going home  However, at this time we provide for you here, the most important instructions / recommendations at discharge:     Notable Medication Adjustments -   Please begin taking diltiazem 120 mg once daily  Testing Required after Discharge -   You will have follow-up testing from cardiology  They will elaborate  Important follow up information -   You have received an ambulatory referral to cardiology  They should call you, if you do not hear from them please call the number below  Other Instructions -   I have a very Javier Mars Zhu  Try not to get too stressed out  Please review this entire after visit summary as additional general instructions including medication list, appointments, activity, diet, any pertinent wound care, and other additional recommendations from your care team that may be provided for you        Sincerely,     Escobar Juarez MD

## 2022-12-24 NOTE — DISCHARGE SUMMARY
[facility]  Discharge- Kaila Rausch 1980, 38 y o  female MRN: [id number]  Unit/Bed#: S -01 Encounter: [id number]  Primary Care Provider: Allen Plaza MD   Date and time admitted to hospital: 12/23/2022  8:45 AM    Atrial fibrillation with rapid ventricular response [facility])  Assessment & Plan  Patient syncopized while at work this morning  Noted to have A  fib with rapid ventricular response in the ED  Heart rate down to the 100s with Cardizem however blood pressure soft   [id number] score of 1-female sex  Has noted intermittent episodes of palpitations since young age  Had 3 cups of coffee yesterday instead of usual 1  And 1 glass of wine  Currently on a Cardizem drip which is on hold secondary to systolic blood pressure  No symptoms currently    Plan  Telemetry overnight reassuring- episodes of sinus bradycardia  No ventricular tachycardia or unstable arrhythmias  Discharge with ambulatory referral to cardiology for follow-up care including heart monitor  Discharge on 120 mg Cardizem p o  daily          MARLIN (generalized anxiety disorder)  Assessment & Plan  Generalized anxiety disorder currently not on any medications  Follows up with a therapist regularly    Migraine  Assessment & Plan  Stable     currently no exacerbations  Takes naproxen    * Vasovagal syncope  Assessment & Plan  Syncope likely secondary to A  fib with drop in blood pressure  Resolved      Medical Problems     Resolved Problems  Date Reviewed: 12/23/2022   None       Discharging Resident: Radha Friend MD  Discharging Attending: No att  providers found  PCP: Allen Plaza MD  Admission Date:   Admission Orders (From admission, onward)     Ordered        12/23/22 1056  [facility]  Once                      Discharge Date: 12/24/22    Consultations During Hospital Stay:  · Cardiology    Procedures Performed:   · Cardiac telemetry monitoring    Significant Findings / Test Results:   · Atrial fibrillation with rapid ventricular response    Incidental Findings:   · None    Test Results Pending at Discharge (will require follow up): · None     Outpatient Tests Requested:  · Holter monitor for assessment of cardiac arrhythmias    Complications: None    Reason for Admission: Syncope    Hospital Course:   Lulú Herrera is a 38 y o  female patient who originally presented to the hospital on 12/23/2022 due to a near syncopal episode while working  She felt fluttering in her chest and lightheaded, she was assessed by friend who was next to her to have color change to her face  She was admitted to the emergency department where she was found to be in atrial fibrillation with rapid ventricular response  Admitted to the floor where she was monitored for arrhythmias as well as started on cardiac medicines  She converted to normal sinus rhythm approximately 4 PM   Maintained in the hospital for further observation  She is now stable for discharge with follow-up with cardiology  Please see above list of diagnoses and related plan for additional information  Condition at Discharge: good    Discharge Day Visit / Exam:   Subjective: Sharath Ramirez says that she is doing very well today  We discussed her history including her recollections of prior past near-syncopal episodes and feelings of palpitations in her chest   She states they have been going on since she was a kid  We discussed her prognosis, as well as follow-up care that will be needed  She understands and agrees with plan  Vitals: Blood Pressure: 106/67 (12/24/22 0730)  Pulse: 70 (12/24/22 0730)  Temperature: 97 8 °F (36 6 °C) (12/24/22 0709)  Temp Source: Oral (12/24/22 0327)  Respirations: 18 (12/24/22 0327)  Height: 5' 5" (165 1 cm) (12/24/22 0730)  Weight - Scale: 63 5 kg (140 lb) (12/24/22 0730)  SpO2: 95 % (12/24/22 0709)  Exam:   Physical Exam  Vitals reviewed  Constitutional:       General: She is not in acute distress  Appearance: Normal appearance  She is not ill-appearing, toxic-appearing or diaphoretic  HENT:      Head: Normocephalic and atraumatic  Right Ear: External ear normal       Left Ear: External ear normal       Nose: Nose normal       Mouth/Throat:      Mouth: Mucous membranes are moist    Eyes:      General: No scleral icterus  Right eye: No discharge  Left eye: No discharge  Conjunctiva/sclera: Conjunctivae normal    Cardiovascular:      Rate and Rhythm: Normal rate and regular rhythm  Pulses: Normal pulses  Heart sounds: Normal heart sounds  No murmur heard  No friction rub  No gallop  Pulmonary:      Effort: Pulmonary effort is normal  No respiratory distress  Breath sounds: Normal breath sounds  No stridor  No wheezing or rales  Abdominal:      General: There is no distension  Palpations: Abdomen is soft  Tenderness: There is no abdominal tenderness  There is no guarding  Musculoskeletal:         General: No deformity or signs of injury  Cervical back: Neck supple  No tenderness  Right lower leg: No edema  Left lower leg: No edema  Lymphadenopathy:      Cervical: No cervical adenopathy  Skin:     General: Skin is warm and dry  Capillary Refill: Capillary refill takes less than 2 seconds  Coloration: Skin is not jaundiced or pale  Neurological:      Mental Status: She is alert and oriented to person, place, and time  Mental status is at baseline  Psychiatric:         Behavior: Behavior normal           Discussion with Family: Updated  () at bedside  Discharge instructions/Information to patient and family:   See after visit summary for information provided to patient and family  Provisions for Follow-Up Care:  See after visit summary for information related to follow-up care and any pertinent home health orders         Disposition:   Home    Planned Readmission: -    Discharge Medications:  See after visit summary for reconciled discharge medications provided to patient and/or family        **Please Note: This note may have been constructed using a voice recognition system**

## 2022-12-24 NOTE — UTILIZATION REVIEW
Initial Clinical Review    Admission: Date/Time/Statement:   Admission Orders (From admission, onward)     Ordered        12/23/22 1056  1 [facility],[address]  Once                      Orders Placed This Encounter   Procedures   • INPATIENT ADMISSION     Standing Status:   Standing     Number of Occurrences:   1     Order Specific Question:   Level of Care     Answer:   Med Surg [16]     Order Specific Question:   Estimated length of stay     Answer:   More than 2 Midnights     Order Specific Question:   Certification     Answer:   I certify that inpatient services are medically necessary for this patient for a duration of greater than two midnights  See H&P and MD Progress Notes for additional information about the patient's course of treatment  ED Arrival Information     Expected   -    Arrival   12/23/2022 08:41    Acuity   Urgent            Means of arrival   Walk-In    Escorted by   Self    Service   Hospitalist    Admission type   Emergency            Arrival complaint   near syncope           Chief Complaint   Patient presents with   • Syncope     While at work "everything went black" ears felt weird and lowered self to ground       Initial Presentation: 38 y o  female with PMH of hydronephrosis, migraine, Afib  She was at work this morning when at around 8:30 AM she suddenly syncopized  She felt lightheaded and her vision went black prior to her syncopal episode  She lowered herself to the ground and apparently lost consciousness  When she awoke a minute later had coworkers standing around her  She is back to her baseline currently but complaining of intermittent episodes of lightheadedness and palpitations  She states that she has intermittent episodes of palpitations since a young age which she has ignored  She was noted to have atrial fibrillation with rapid ventricular rate and was initiated on Cardizem-heart rate came down from the 140s to around half    However her systolic blood pressure is now between 90 and 100  She states that she always has had low blood pressures  Plan: Inpatient admission for evaluation and treatment of Afib with RVR, vasovagal syncope: currently on Cardizem drip, Echo, Cardiology consult  Date: 12/24   Day 2:     Cardiology consult: In the ED she received IV Cardizem bolus 15 mg x 1 w/subsequent GTT initiation -- weaned off at [address] last evening  Telemetry review - Converted to NSR around 1600 yesterday - now in NSR w/rates in the 60's  Follow Echo, start Cardizem  mg daily       ED Triage Vitals   Temperature Pulse Respirations Blood Pressure SpO2   12/23/22 0851 12/23/22 0851 12/23/22 0851 12/23/22 0851 12/23/22 0851   97 6 °F (36 4 °C) (!) 53 18 97/63 96 %      Temp Source Heart Rate Source Patient Position - Orthostatic VS BP Location FiO2 (%)   12/23/22 0851 12/23/22 0851 12/23/22 0851 12/23/22 0851 --   Oral Monitor Lying Right arm       Pain Score       12/23/22 1326       No Pain          Wt Readings from Last 1 Encounters:   12/24/22 63 5 kg (140 lb)     Additional Vital Signs:     Date/Time Temp Pulse Resp BP MAP (mmHg) SpO2 O2 Device   12/24/22 0730 -- 70 -- 106/67 -- -- --   12/24/22 07:09:48 97 8 °F (36 6 °C) 78 -- 106/67 80 95 % --   12/24/22 03:27:39 97 8 °F (36 6 °C) 71 18 105/63 77 94 % None (Room air)   12/24/22 00:16:23 97 7 °F (36 5 °C) 71 18 110/70 83 96 % None (Room air)   12/23/22 21:24:59 98 5 °F (36 9 °C) 72 16 116/67 83 95 % --   12/23/22 20:26:39 -- 76 -- 108/80 89 95 % --   12/23/22 1930 -- -- -- -- -- -- None (Room air)   12/23/22 16:27:01 97 5 °F (36 4 °C) 86 16 96/68 77 94 % --   12/23/22 16:26:20 97 5 °F (36 4 °C) 78 16 96/68 77 94 % --   12/23/22 16:25:21 -- 84 16 96/68 77 96 % --   12/23/22 14:18:49 -- 88 -- 118/78 91 96 % --   12/23/22 13:58:20 -- 92 16 126/109 Abnormal  115 96 % --   12/23/22 1330 -- 77 -- 106/81 89 94 % --   12/23/22 13:13:58 97 5 °F (36 4 °C) 145 Abnormal  16 127/74 92 97 % None (Room air)   12/23/22 1245 -- 153 Abnormal  18 99/80 86 97 % None (Room air)   12/23/22 1200 -- 143 Abnormal  17 108/77 89 97 % None (Room air)   12/23/22 1130 -- 132 Abnormal  18 109/86 94 96 % None (Room air)   12/23/22 1100 -- 98 17 110/80 90 97 % None (Room air)   12/23/22 1030 -- 82 18 112/90 98 99 % None (Room air)   12/23/22 1015 -- 100 17 107/71 84 97 % None (Room air)   12/23/22 1001 -- 157 Abnormal  18 112/71 -- 97 % None (Room air)   12/23/22 0954 -- 154 Abnormal  17 123/93 -- 98 % None (Room air)   12/23/22 0922 -- 165 Abnormal  18 109/56 -- 98 % None (Room air)   12/23/22 0900 -- 161 Abnormal  20 97/58 77 97 % None (Room air)     Pertinent Labs/Diagnostic Test Results:   XR chest 1 view portable   Final Result by Luiz Cowart MD (12/23 1220)      No acute cardiopulmonary disease                    Workstation performed: [id number]               Results from last 7 days   Lab Units 12/23/22  1410 12/23/22  0919   WBC Thousand/uL  --  5 85   HEMOGLOBIN g/dL  --  15 2   HEMATOCRIT %  --  43 0   PLATELETS Thousands/uL 218 230   NEUTROS ABS Thousands/µL  --  3 95         Results from last 7 days   Lab Units 12/23/22  0919   SODIUM mmol/L 137   POTASSIUM mmol/L 3 7   CHLORIDE mmol/L 105   CO2 mmol/L 22   ANION GAP mmol/L 10   BUN mg/dL 16   CREATININE mg/dL 0 69   EGFR ml/min/1 73sq m 106   CALCIUM mg/dL 9 3     Results from last 7 days   Lab Units 12/23/22  0919   AST U/L 20   ALT U/L 13   ALK PHOS U/L 50   TOTAL PROTEIN g/dL 6 9   ALBUMIN g/dL 4 2   TOTAL BILIRUBIN mg/dL 0 59     Results from last 7 days   Lab Units 12/23/22  0849   POC GLUCOSE mg/dl 159*     Results from last 7 days   Lab Units 12/23/22  0919   GLUCOSE RANDOM mg/dL 154*         Results from last 7 days   Lab Units 12/23/22  1410 12/23/22  1224 12/23/22  0919   HS TNI 0HR ng/L  --   --  6   HS TNI 2HR ng/L  --  17  --    HSTNI D2 ng/L  --  11  --    HS TNI 4HR ng/L 26  --   --    HSTNI D4 ng/L 20*  --   --              Results from last 7 days   Lab Units 12/23/22  1410 12/23/22  0919   TSH 3RD GENERATON uIU/mL 0 237* 0 391*         ED Treatment:   Medication Administration from 12/23/2022 0840 to 12/23/2022 1307       Date/Time Order Dose Route Action     12/23/2022 0920 EST lactated ringers bolus 1,000 mL 1,000 mL Intravenous New Bag     12/23/2022 1002 EST diltiazem (CARDIZEM) injection 15 mg 15 mg Intravenous Given     12/23/2022 0950 EST calcium gluconate 1 g in sodium chloride 0 9% 50 mL (premix) 1 g Intravenous New Bag     12/23/2022 1142 EST diltiazem (CARDIZEM) 125 mg in sodium chloride 0 9 % 125 mL infusion 2 mg/hr Intravenous New Bag        Past Medical History:   Diagnosis Date   • COVID-19    • Diverticulosis    • Hydronephrosis    • Migraine    • PONV (postoperative nausea and vomiting)    • TMJ arthralgia     Resolved 10/29/2015    • Ureteral stricture      Present on Admission:  • Atrial fibrillation with rapid ventricular response (HCC)  • Vasovagal syncope  • MARLIN (generalized anxiety disorder)  • Migraine      Admitting Diagnosis: Syncope [R55]  Near syncope [R55]  At[facility] fibrillation, unspecified type ([facility] ) [I48 91]  Age/Sex: 38 y o  female  Admission Orders:  Scheduled Medications:  enoxaparin, 40 mg, Subcutaneous, Daily  lactated ringers, 1,000 mL, Intravenous, Once  ondansetron, 4 mg, Intravenous, Once  senna, 1 tablet, Oral, Daily      Continuous IV Infusions:    diltiazem (CARDIZEM) 125 mg in sodium chloride 0 9 % 125 mL infusion  Dose: 1-15 mg/hr  Freq: Once Route: IV  Last Dose: 7 mg/hr (12/23/22 1319)  Start: 12/23/22 1015 End: 12/23/22 1142     PRN Meds:  acetaminophen, 650 mg, Oral, Q6H PRN        IP CONSULT TO CARDIOLOGY    Network Utilization Review Department  ATTENTION: Please call with any questions or concerns to [phone] and carefully listen to the prompts so that you are directed to the right person   All voicemails are confidential   Souleymane Aguiar all requests for admission clinical reviews, approved or denied determinations and any other requests to dedicated fax number below belonging to the campus where the patient is receiving treatment   List of dedicated fax numbers for the Facilities:  [address] [place] [address] DENIALS (Administrative/Medical Necessity) [phone]   [address]  (Maternity/NICU/Pediatrics) [phone]   [address] [phone]   [address] [phone]   [address] [address] [facility] [phone]   [address] [facility] 134 815 [facility] [phone]

## 2022-12-24 NOTE — UTILIZATION REVIEW
NOTIFICATION OF INPATIENT ADMISSION   AUTHORIZATION REQUEST   SERVICING FACILITY:   [address] [place] [facility], [address] [place] [address]  Tax ID: [id number]  NPI: [id number]   ATTENDING PROVIDER:  Attending Name and NPI#: Iraida Meyer Asa [[id number]]  Address: [address] [place] [address] [address] [place], [address] [place] [address]  Phone: [phone]     ADMISSION INFORMATION:  Place of Service: Inpatient [facility] Code: 21  Inpatient Admission Date/Time: 12/23/22 10:56 AM  Discharge Date/Time: 12/24/2022 12:15 PM  Admitting Diagnosis Code/Description:  Syncope [R55]  Near syncope [R55]  Atrial fibrillation, unspecified type ([facility] ) [I48 91]     UTILIZATION REVIEW CONTACT:  Yonis Crawford Utilization   Network Utilization Review Department  Phone: [phone]  Fax: [phone]  Email: [email or web address]  Contact for approvals/pending authorizations, clinical reviews, and discharge  PHYSICIAN ADVISORY SERVICES:  Medical Necessity Denial & Oxgd-jw-Hlvr Review  Phone: [phone]  Fax: [phone]  Email: [email or web address]

## 2022-12-24 NOTE — CONSULTS
Cardiology   Rosa Whitlock 38 y o  female MRN: [id number]  Unit/Bed#: S -01 Encounter: [id number]      Reason for Consult / Principal Problem: New onset afib w/RVR    Physician Requesting Consult:  Taylor Nicole MD    Outpatient Cardiologist: None    Assessment  1  New onset atrial fibrillation w/RVR (POA)  -Initial ECG demonstrated atrial fibrillation w/RVR rate 163 bpm   -In the ED she received IV Cardizem bolus 15 mg x 1 w/subsequent GTT initiation -- weaned off at 1836 last evening  -Telemetry review - Converted to NSR around 1600 yesterday - now in NSR w/rates in the 60's  -[id number] score = 1 (female) - 0 6% CVA risk per year  -TSH 0 23/free t4 1 04    2  Syncopal episode  -Appears vasovagal in nature - preceding symptoms of lightheadedness, warmth/flushing, diaphoresis, and 'dark vision'      Plan   -F/u TTE imaging results    -Start Cardizem  mg daily  -CHADS score = 1 (0 6% risk of CVA per yr) - given possible isolated occurence and possible clear trigger w/excessive caffeine / alcohol consumption (the day prior to event)  Likely to hold off on initiating AC at this time (will discuss w/attending) - we could consider outpatient cardiac monitoring to assess for recurrence and if recurrence we could consider the use of antiarrhythmic therapies for afib prevention    -Would recommend outpatient exercise stress test for ischemia r/o  -Will arrange f/u w/Cardioloy on DC  HPI: Rosa Whitlock 38y o  year old female with no significant cardiac hx  Medical hx includes generalized anxiety  Former smoker (quit in 2007), social alcohol use, no illicit drug use  Family hx for heart disease (CAD / CABG - pt mothers in her 46s), CVA in maternal grandmother, CAD / MI in maternal grandfather in his 52's  Reports intermittent palpations for many years  No workup for this in the past    She did not previously follow w/a routine cardiology provider prior to admission    1 day prior to admission she did consume 3 cups of coffee in the a m and 2 glasses of wine in the evening  She woke up early yesterday morning feeling 'jittery and panicky'  She then went to work yesterday morning, had a cup of coffee, and began performing her usual work duties  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 she had sudden onset of feeling 'lightheaded', 'warm/flushed' , 'sweaty' and then her vision went 'black' and passed out briefly - per her account  She presented to the [address] [place] [address] on 12/23/22 after sustaining a syncopal episode while at work  She experienced preceding symptoms of lightheadedness and reports 'vision going black' prior to her syncopal episodes  Further workup in the ED  Hemodynamics on admission  Temp 97 6F, HR 53, RR 18, BP 97/63, sat 96& on RA  Lab data on admission  -BMP/CBC unremarkable  -HS troponin levels - unremarkable  Imaging   -CXR - no acute cardiopulmonary disease    Initial ECG demonstrated atrial fibrillation w/RVR rate 163 bpm     In the ED she received IV Cardizem bolus 15 mg x 1 w/subsequent GTT initiation which was weaned off last evening after she had converted back to NSR  Cardiology now consulted for further treatment recommendations/management        Family History:   Family History   Problem Relation Age of Onset   • Heart attack Mother    • Hypertension Mother    • Colon cancer Brother    • Diabetes type II Paternal Grandfather      Historical Information   Past Medical History:   Diagnosis Date   • COVID-19    • Diverticulosis    • Hydronephrosis    • Migraine    • PONV (postoperative nausea and vomiting)    • TMJ arthralgia     Resolved 10/29/2015    • Ureteral stricture      Past Surgical History:   Procedure Laterality Date   • APPENDECTOMY     • CYSTOSCOPY W/ LASER LITHOTRIPSY Right 11/2/2017    Procedure: CYSTOSCOPY;  RIGHT URETEROSCOPY WITH  HOLMIUM LASER INCISION OF URETERAL STRICTURE; (WITH HYDROSTATIC RIGHT URETERAL DILATION), BILATERAL RETROGRADE PYELOGRAM AND INSERTION OF RIGHT URETERAL STENT X 2 (4 7 X 26); Surgeon: Noah Benavidez MD;  [facility];  Service: Urology   • DILATION AND CURETTAGE OF UTERUS N/A 12/30/2016    Procedure: DILATATION AND CURETTAGE;  Surgeon: Mai Pickering MD;  [facility];  Service:    • DILATION AND EVACUATION     • HYSTERECTOMY N/A 5/22/2017    Procedure: EXAM UNDER ANESTHESIA;  Surgeon: Mai Pickering MD;  [facility];  Service:    • HYSTEROSCOPY N/A 12/30/2016    Procedure: HYSTEROSCOPY;  Surgeon: Mai Pickering MD;  [facility];  Service:    • AZ CYSTO BLADDER W/URETERAL CATHETERIZATION Bilateral 3/27/2017    Procedure: CYSTOSCOPY WITH RETROGRADE PYELOGRAM; RIGHT STENT INSERTION;  Surgeon: Noah Benavidez MD;  [facility];  Service: Urology   • AZ CYSTO BLADDER W/URETERAL CATHETERIZATION N/A 1/11/2018    Procedure: CYSTOSCOPY , BILATERAL RETROGRADE PYELOGRAM WITH RIGHT STENT EXTRACTION;  Surgeon: Noah Benavidez MD;  [facility];  Service: Urology   • AZ CYSTO BLADDER W/URETERAL CATHETERIZATION Right 1/15/2018    Procedure: CYSTOSCOPY, RIGHT RETROGRADE PYELOGRAM WITH INSERTION OF RIGHT STENT URETERAL;  Surgeon: Noah Benavidez MD;  [facility];  Service: Urology   • AZ LAPAROSCOPY W TOTAL HYSTERECTOMY UTERUS 250 GM/< N/A 3/24/2017    Procedure: TOTAL LAPAROSCOPIC HYSTERECTOMY   bilateral salpingectomy, cysto;   Surgeon: Mai Pickering MD;  [facility];  Service: Gynecology   • REIMPLANT URETER IN BLADDER Right 2/22/2018    Procedure: URETERAL NEOCYSTOSTOMY, EXCISION OF URETERAL STRICTURE ;  Surgeon: Noah Benavidez MD;  [facility];  Service: Urology   • URETERAL STENT PLACEMENT Right 2/22/2018    Procedure: INSERTION STENT URETERAL;  Surgeon: Noah Benavidez MD;  [facility];  Service: Urology     Social History   Social History     Substance and Sexual Activity   Alcohol Use Yes   • Alcohol/week: 2 0 standard drinks   • Types: 2 Glasses of wine per week     Social History     Substance and Sexual Activity   Drug Use No     Social History     Tobacco Use   Smoking Status Former   • Packs/day: 0 20   • Years: 12 00   • Pack years: 2 40   • Types: Cigarettes   • Quit date: 2007   • Years since quitting: 15 9   Smokeless Tobacco Never   Tobacco Comments    quit 13 years ago     Family History:   Family History   Problem Relation Age of Onset   • Heart attack Mother    • Hypertension Mother    • Colon cancer Brother    • Diabetes type II Paternal Grandfather        Review of Systems:  Review of Systems   Constitutional: Negative for chills, fatigue and fever  Eyes: Negative for visual disturbance  Respiratory: Negative for cough, chest tightness and shortness of breath  Cardiovascular: Negative for chest pain, palpitations and leg swelling  Gastrointestinal: Negative for abdominal pain  Neurological: Negative for dizziness, light-headedness and headaches  All other systems reviewed and are negative            Scheduled Meds:  Current Facility-Administered Medications   Medication Dose Route Frequency Provider Last Rate   • acetaminophen  650 mg Oral Q6H PRN Laura Rodgers MD     • enoxaparin  40 mg Subcutaneous Daily Laura Rodgers MD     • lactated ringers  1,000 mL Intravenous Once Laura Rodgers MD Stopped (12/23/22 0712)   • ondansetron  4 mg Intravenous Once Laura Rodgers MD     • senna  1 tablet Oral Daily Laura Rodgers MD       Continuous Infusions:   PRN Meds: •  acetaminophen  all current active meds have been reviewed and current meds:   Current Facility-Administered Medications   Medication Dose Route Frequency   • acetaminophen (TYLENOL) tablet 650 mg  650 mg Oral Q6H PRN   • enoxaparin (LOVENOX) subcutaneous injection 40 mg  40 mg Subcutaneous Daily   • lactated ringers bolus 1,000 mL  1,000 mL Intravenous Once   • ondansetron (ZOFRAN) injection 4 mg  4 mg Intravenous Once   • senna (SENOKOT) tablet 8 6 mg  1 tablet Oral Daily       Allergies   Allergen Reactions   • Shellfish-Derived Products - Food Allergy Anaphylaxis     THROAT ITCHY MIGRAINES   • Fentanyl Itching       Objective   Vitals: Blood pressure 106/67, pulse 70, temperature 97 8 °F (36 6 °C), resp  rate 18, height 5' 5" (1 651 m), weight 63 5 kg (140 lb), SpO2 95 %  , Body mass index is 23 3 kg/m² ,   Orthostatic Blood Pressures    Flowsheet Row Most Recent Value   Blood Pressure 106/67 filed at 12/24/2022 0730   Patient Position - Orthostatic VS Lying filed at 12/24/2022 0327          No intake or output data in the 24 hours ending 12/24/22 0950    Invasive Devices     Peripheral Intravenous Line  Duration           Peripheral IV 03/22/18 Left Antecubital 1737 days                Physical Exam:  Physical Exam  Vitals and nursing note reviewed  Constitutional:       General: She is not in acute distress  Appearance: She is not diaphoretic  HENT:      Head: Normocephalic and atraumatic  Eyes:      General: No scleral icterus  Cardiovascular:      Rate and Rhythm: Normal rate and regular rhythm  Pulses: Normal pulses  Heart sounds: Normal heart sounds  No murmur heard  Pulmonary:      Effort: Pulmonary effort is normal       Breath sounds: Normal breath sounds  No wheezing or rales  Abdominal:      Palpations: Abdomen is soft  Tenderness: There is no abdominal tenderness  Musculoskeletal:      Cervical back: Neck supple  Right lower leg: No edema  Left lower leg: No edema  Skin:     General: Skin is warm and dry  Capillary Refill: Capillary refill takes less than 2 seconds  Neurological:      General: No focal deficit present  Mental Status: She is alert and oriented to person, place, and time     Psychiatric:         Mood and Affect: Mood normal          Lab Results:   Recent Results (from the past 24 hour(s))   HS Troponin I 2hr    Collection Time: 12/23/22 12:24 PM   Result Value Ref Range    hs TnI 2hr 17 "Refer to ACS Flowchart"- see link ng/L    Delta 2hr hsTnI 11 <20 ng/L   HS Troponin I 4hr    Collection Time: 12/23/22  2:10 PM   Result Value Ref Range    hs TnI 4hr 26 "Refer to ACS Flowchart"- see link ng/L    Delta 4hr hsTnI 20 (H) <20 ng/L   Platelet count    Collection Time: 12/23/22  2:10 PM   Result Value Ref Range    Platelets 240 864 - 278 Thousands/uL    MPV 10 7 8 9 - 12 7 fL   TSH, 3rd generation with Free T4 reflex    Collection Time: 12/23/22  2:10 PM   Result Value Ref Range    TSH 3RD GENERATON 0 237 (L) 0 450 - 4 500 uIU/mL   T4, free    Collection Time: 12/23/22  2:10 PM   Result Value Ref Range    Free T4 1 04 0 76 - 1 46 ng/dL   Echo complete w/ contrast if indicated    Collection Time: 12/24/22  8:02 AM   Result Value Ref Range    A4C EF 65 %    LVIDd 4 20 cm    LVIDS 3 10 cm    IVSd 1 10 cm    LVPWd 1 10 cm    FS 26 28 - 44    MV E' Tissue Velocity Septal 11 cm/s    E wave deceleration time 224 ms    MV Peak E Damion 66 cm/s    MV Peak A Damion 0 55 m/s    RVID d 2 3 cm    LA size 2 2 cm    LA length (A2C) 2 90 cm    RA 2D Volume 16 0 mL    RAA A4C 8 cm2    MV stenosis pressure 1/2 time 65 ms    MV valve area p 1/2 method 3 38     Ao root 3 30 cm    Left ventricular stroke volume (2D) 41 00 mL    IVS 1 1 cm    LEFT VENTRICLE SYSTOLIC VOLUME (MOD BIPLANE) 2D 38 mL    LV DIASTOLIC VOLUME (MOD BIPLANE) 2D 79 mL    Left Atrium Area-systolic Four Chamber 13 cm2    Left Atrium Area-systolic Apical Two Chamber 7 6 cm2    LVSV, 2D 41 mL     Imaging: I have personally reviewed pertinent reports  and I have personally reviewed pertinent films in PACS  Code Status:LVL 1 Full Code  Epic/ Allscripts/Care Everywhere records reviewed: Yes    * Please Note: Fluency DirectDictation voice to text software may have been used in the creation of this document   **

## 2022-12-24 NOTE — PLAN OF CARE
Problem: Potential for Falls  Goal: Patient will remain free of falls  Description: INTERVENTIONS:  - Educate patient/family on patient safety including physical limitations  - Instruct patient to call for assistance with activity   - Consult OT/PT to assist with strengthening/mobility   - Keep Call bell within reach  - Keep bed low and locked with side rails adjusted as appropriate  - Keep care items and personal belongings within reach  - Initiate and maintain comfort rounds  - Make Fall Risk Sign visible to staff  - Offer Toileting every 2 Hours, in advance of need  - Initiate/Maintain bed/chair alarm  - Obtain necessary fall risk management equipment  - Apply yellow socks and bracelet for high fall risk patients  - Consider moving patient to room near nurses station  Outcome: Adequate for Discharge     Problem: CARDIOVASCULAR - ADULT  Goal: Maintains optimal cardiac output and hemodynamic stability  Description: INTERVENTIONS:  - Monitor I/O, vital signs and rhythm  - Monitor for S/S and trends of decreased cardiac output  - Administer and titrate ordered vasoactive medications to optimize hemodynamic stability  - Assess quality of pulses, skin color and temperature  - Assess for signs of decreased coronary artery perfusion  - Instruct patient to report change in severity of symptoms  Outcome: Adequate for Discharge  Goal: Absence of cardiac dysrhythmias or at baseline rhythm  Description: INTERVENTIONS:  - Continuous cardiac monitoring, vital signs, obtain 12 lead EKG if ordered  - Administer antiarrhythmic and heart rate control medications as ordered  - Monitor electrolytes and administer replacement therapy as ordered  Outcome: Adequate for Discharge

## 2022-12-25 NOTE — ASSESSMENT & PLAN NOTE
Patient syncopized while at work this morning  Noted to have A  fib with rapid ventricular response in the ED  Heart rate down to the 100s with Cardizem however blood pressure soft   [id number] score of 1-female sex  Has noted intermittent episodes of palpitations since young age  Had 3 cups of coffee yesterday instead of usual 1  And 1 glass of wine  Currently on a Cardizem drip which is on hold secondary to systolic blood pressure  No symptoms currently    Plan  Telemetry overnight reassuring- episodes of sinus bradycardia   No ventricular tachycardia or unstable arrhythmias  Discharge with ambulatory referral to cardiology for follow-up care including heart monitor  Discharge on 120 mg Cardizem p o  daily

## 2022-12-27 ENCOUNTER — TRANSITIONAL CARE MANAGEMENT (OUTPATIENT)
Dept: FAMILY MEDICINE CLINIC | Facility: CLINIC | Age: 42
End: 2022-12-27

## 2022-12-28 NOTE — UTILIZATION REVIEW
Please note, this Notification was originally sent to you via the other fax # [phone] on 12/24/22 1255pm    [address]:   [address] [place] [address] [facility] [place], [address] [place] [address]  Tax ID: [id number]  NPI: [id number]   ATTENDING PROVIDER:  Attending Name and NPI#: Michelle Lewis [place], [place] [[id number]]  Address: [address] [place] [address] [facility] [place], [address] [place] [address]  Phone: [phone]     ADMISSION INFORMATION:  Place of Service: Inpatient [facility] Code: 21  Inpatient Admission Date/Time: 12/23/22 10:56 AM  Discharge Date/Time: 12/24/2022 12:15 PM  Admitting Diagnosis Code/Description:  Syncope [R55]  Near syncope [R55]  Atrial fibrillation, unspecified type ([address] ) [I48 91]     UTILIZATION REVIEW CONTACT:  Nina Downing Utilization   Network Utilization Review Department  Phone: [phone]  Fax: [phone]  Email: [email or web address]  Contact for approvals/pending authorizations, clinical reviews, and discharge  PHYSICIAN ADVISORY SERVICES:  Medical Necessity Denial & [facility] Review  Phone: [phone]  Fax: [phone]  Email: [email or web address]           Initial Clinical Review    Admission: Date/Time/Statement:   Admission Orders (From admission, onward)     Ordered        12/23/22 1056  [facility] [place]  Once                      Orders Placed This Encounter   Procedures   • INPATIENT ADMISSION     Standing Status:   Standing     Number of Occurrences:   1     Order Specific Question:   Level of Care     Answer:   Med Surg [16]     Order Specific Question:   Estimated length of stay     Answer:   More than 2 Midnights     Order Specific Question:   Certification     Answer:   I certify that inpatient services are medically necessary for this patient for a duration of greater than two midnights   See H&P and MD Progress Notes for additional information about the patient's course of treatment  ED Arrival Information     Expected   -    Arrival   12/23/2022 08:41    Acuity   Urgent            Means of arrival   Walk-In    Escorted by   Self    Service   Hospitalist    Admission type   Emergency            Arrival complaint   near syncope           Chief Complaint   Patient presents with   • Syncope     While at work "everything went black" ears felt weird and lowered self to ground       Initial Presentation: 38 y o  female with PMH of hydronephrosis, migraine, Afib  She was at work this morning when at around 8:30 AM she suddenly syncopized  She felt lightheaded and her vision went black prior to her syncopal episode  She lowered herself to the ground and apparently lost consciousness  When she awoke a minute later had coworkers standing around her  She is back to her baseline currently but complaining of intermittent episodes of lightheadedness and palpitations  She states that she has intermittent episodes of palpitations since a young age which she has ignored  She was noted to have atrial fibrillation with rapid ventricular rate and was initiated on Cardizem-heart rate came down from the 140s to around half  However her systolic blood pressure is now between 90 and 100  She states that she always has had low blood pressures  Plan: Inpatient admission for evaluation and treatment of Afib with RVR, vasovagal syncope: currently on Cardizem drip, Echo, Cardiology consult  Date: 12/24   Day 2:     Cardiology consult: In the ED she received IV Cardizem bolus 15 mg x 1 w/subsequent GTT initiation -- weaned off at [address] last evening  Telemetry review - Converted to NSR around 1600 yesterday - now in NSR w/rates in the 60's  Follow Echo, start Cardizem  mg daily       ED Triage Vitals   Temperature Pulse Respirations Blood Pressure SpO2   12/23/22 0851 12/23/22 0851 12/23/22 0851 12/23/22 0851 12/23/22 0851   97 6 °F (36 4 °C) (!) 53 18 97/63 96 % Temp Source Heart Rate Source Patient Position - Orthostatic VS BP Location FiO2 (%)   12/23/22 0851 12/23/22 0851 12/23/22 0851 12/23/22 0851 --   Oral Monitor Lying Right arm       Pain Score       12/23/22 1326       No Pain          Wt Readings from Last 1 Encounters:   12/24/22 63 5 kg (140 lb)     Additional Vital Signs:     Date/Time Temp Pulse Resp BP MAP (mmHg) SpO2 O2 Device   12/24/22 0730 -- 70 -- 106/67 -- -- --   12/24/22 07:09:48 97 8 °F (36 6 °C) 78 -- 106/67 80 95 % --   12/24/22 03:27:39 97 8 °F (36 6 °C) 71 18 105/63 77 94 % None (Room air)   12/24/22 00:16:23 97 7 °F (36 5 °C) 71 18 110/70 83 96 % None (Room air)   12/23/22 21:24:59 98 5 °F (36 9 °C) 72 16 116/67 83 95 % --   12/23/22 20:26:39 -- 76 -- 108/80 89 95 % --   12/23/22 1930 -- -- -- -- -- -- None (Room air)   12/23/22 16:27:01 97 5 °F (36 4 °C) 86 16 96/68 77 94 % --   12/23/22 16:26:20 97 5 °F (36 4 °C) 78 16 96/68 77 94 % --   12/23/22 16:25:21 -- 84 16 96/68 77 96 % --   12/23/22 14:18:49 -- 88 -- 118/78 91 96 % --   12/23/22 13:58:20 -- 92 16 126/109 Abnormal  115 96 % --   12/23/22 1330 -- 77 -- 106/81 89 94 % --   12/23/22 13:13:58 97 5 °F (36 4 °C) 145 Abnormal  16 127/74 92 97 % None (Room air)   12/23/22 1245 -- 153 Abnormal  18 99/80 86 97 % None (Room air)   12/23/22 1200 -- 143 Abnormal  17 108/77 89 97 % None (Room air)   12/23/22 1130 -- 132 Abnormal  18 109/86 94 96 % None (Room air)   12/23/22 1100 -- 98 17 110/80 90 97 % None (Room air)   12/23/22 1030 -- 82 18 112/90 98 99 % None (Room air)   12/23/22 1015 -- 100 17 107/71 84 97 % None (Room air)   12/23/22 1001 -- 157 Abnormal  18 112/71 -- 97 % None (Room air)   12/23/22 0954 -- 154 Abnormal  17 123/93 -- 98 % None (Room air)   12/23/22 0922 -- 165 Abnormal  18 109/56 -- 98 % None (Room air)   12/23/22 0900 -- 161 Abnormal  20 97/58 77 97 % None (Room air)     Pertinent Labs/Diagnostic Test Results:   XR chest 1 view portable   Final Result by Nicky Grace MD (12/23 1220)      No acute cardiopulmonary disease                    Workstation performed: [id number]               Results from last 7 days   Lab Units 12/23/22  1410 12/23/22  0919   WBC Thousand/uL  --  5 85   HEMOGLOBIN g/dL  --  15 2   HEMATOCRIT %  --  43 0   PLATELETS Thousands/uL 218 230   NEUTROS ABS Thousands/µL  --  3 95         Results from last 7 days   Lab Units 12/23/22  0919   SODIUM mmol/L 137   POTASSIUM mmol/L 3 7   CHLORIDE mmol/L 105   CO2 mmol/L 22   ANION GAP mmol/L 10   BUN mg/dL 16   CREATININE mg/dL 0 69   EGFR ml/min/1 73sq m 106   CALCIUM mg/dL 9 3     Results from last 7 days   Lab Units 12/23/22  0919   AST U/L 20   ALT U/L 13   ALK PHOS U/L 50   TOTAL PROTEIN g/dL 6 9   ALBUMIN g/dL 4 2   TOTAL BILIRUBIN mg/dL 0 59     Results from last 7 days   Lab Units 12/23/22  0849   POC GLUCOSE mg/dl 159*     Results from last 7 days   Lab Units 12/23/22  0919   GLUCOSE RANDOM mg/dL 154*         Results from last 7 days   Lab Units 12/23/22  1410 12/23/22  1224 12/23/22  0919   HS TNI 0HR ng/L  --   --  6   HS TNI 2HR ng/L  --  17  --    HSTNI D2 ng/L  --  11  --    HS TNI 4HR ng/L 26  --   --    HSTNI D4 ng/L 20*  --   --              Results from last 7 days   Lab Units 12/23/22  1410 12/23/22  0919   TSH 3RD GENERATON uIU/mL 0 237* 0 391*         ED Treatment:   Medication Administration from 12/23/2022 0840 to 12/23/2022 1307       Date/Time Order Dose Route Action     12/23/2022 0920 EST lactated ringers bolus 1,000 mL 1,000 mL Intravenous New Bag     12/23/2022 1002 EST diltiazem (CARDIZEM) injection 15 mg 15 mg Intravenous Given     12/23/2022 0950 EST calcium gluconate 1 g in sodium chloride 0 9% 50 mL (premix) 1 g Intravenous New Bag     12/23/2022 1142 EST diltiazem (CARDIZEM) 125 mg in sodium chloride 0 9 % 125 mL infusion 2 mg/hr Intravenous New Bag        Past Medical History:   Diagnosis Date   • COVID-19    • Diverticulosis    • Hydronephrosis    • Migraine    • PONV (postoperative nausea and vomiting)    • TMJ arthralgia     Resolved 10/29/2015    • Ureteral stricture      Present on Admission:  • Atrial fibrillation with rapid ventricular response (HCC)  • Vasovagal syncope  • MARLIN (generalized anxiety disorder)  • Migraine      Admitting Diagnosis: Syncope [R55]  Near syncope [R55]  Atrial fibrillation, unspecified type ([address] ) [I48 91]  Age/Sex: 38 y o  female  Admission Orders:  Scheduled Medications:  No current facility-administered medications for this encounter  Continuous IV Infusions:    diltiazem (CARDIZEM) 125 mg in sodium chloride 0 9 % 125 mL infusion  Dose: 1-15 mg/hr  Freq: Once Route: IV  Last Dose: 7 mg/hr (12/23/22 1319)  Start: 12/23/22 1015 End: 12/23/22 1142  No current facility-administered medications for this encounter  PRN Meds:  No current facility-administered medications for this encounter  IP CONSULT TO CARDIOLOGY    Network Utilization Review Department  ATTENTION: Please call with any questions or concerns to [phone] and carefully listen to the prompts so that you are directed to the right person  All voicemails are confidential   Janette Hamilton all requests for admission clinical reviews, approved or denied determinations and any other requests to dedicated fax number below belonging to the campus where the patient is receiving treatment   List of dedicated fax numbers for the Facilities:  [address] DENIALS (Administrative/Medical Necessity) [phone]   [address] [place] [address] (Maternity/NICU/Pediatrics) [phone]   [address] 30 [facility] [address] [place] [address] Medical [place] [address] [place] [address] [address] [facility] 134 032 [facility] [phone]

## 2023-01-04 ENCOUNTER — OFFICE VISIT (OUTPATIENT)
Dept: FAMILY MEDICINE CLINIC | Facility: CLINIC | Age: 43
End: 2023-01-04

## 2023-01-04 VITALS
HEART RATE: 78 BPM | OXYGEN SATURATION: 98 % | SYSTOLIC BLOOD PRESSURE: 104 MMHG | BODY MASS INDEX: 23.26 KG/M2 | DIASTOLIC BLOOD PRESSURE: 72 MMHG | RESPIRATION RATE: 14 BRPM | HEIGHT: 65 IN | WEIGHT: 139.6 LBS | TEMPERATURE: 98.1 F

## 2023-01-04 DIAGNOSIS — I48.91 ATRIAL FIBRILLATION WITH RAPID VENTRICULAR RESPONSE (HCC): ICD-10-CM

## 2023-01-04 DIAGNOSIS — Z09 HOSPITAL DISCHARGE FOLLOW-UP: Primary | ICD-10-CM

## 2023-01-04 NOTE — PROGRESS NOTES
Assessment & Plan     1  Hospital discharge follow-up  Comments:  Admitted with newly dx AFIB with RVR  Reviewed records  Converted to sinus  On Cardizem 120mg per cards  BP maintianing  Asx  [id number] score of 1 therefore ASA is appropriate  Fu with cards next week  Monitor symptoms  2  Atrial fibrillation with rapid ventricular response [facility])         Subjective     Transitional Care Management Review:   Steva Cheadle is a 38 y o  female here for TCM follow up  During the TCM phone call patient stated:  TCM Call     Date and time call was made  12/27/2022  3:05 PM    Patient was hospitialized at  [address] [place] [address]    Date of Admission  12/23/22    Date of discharge  12/24/22    Diagnosis  Vasovagal syncope    Disposition  Home    Were the patients medications reviewed and updated  No    Current Symptoms  None      TCM Call     Should patient be enrolled in anticoag monitoring? No    Scheduled for follow up? Yes    Did you obtain your prescribed medications  Yes    Do you need help managing your prescriptions or medications  No    Is transportation to your appointment needed  No    I have advised the patient to call PCP with any new or worsening symptoms  Siri Fleming,     Living Arrangements  Spouse or Significiant other    Are you recieving any outpatient services  No    Are you recieving home care services  No    Are you using any community resources  No    Current waiver services  No    Have you fallen in the last 12 months  No    Interperter language line needed  No         Here for TCM  Was at work when she felt fluttering in her chest and lightheaded  She eventually loss consciousness  She was assessed by friend who recommended she go to the ED  In the ED, she was found to be in atrial fibrillation with rapid ventricular response  She received cardizem IV and was transitioned to PO from  She converted to normal sinus rhythm later that day   She was discharged with zio patch and oral Cardizem         Review of Systems    Objective     /72 (BP Location: Left arm, Patient Position: Sitting, Cuff Size: Standard)   Pulse 78   Temp 98 1 °F (36 7 °C) (Tympanic)   Resp 14   Ht 5' 5" (1 651 m)   Wt 63 3 kg (139 lb 9 6 oz)   SpO2 98%   BMI 23 23 kg/m²      Physical Exam  Vitals and nursing note reviewed  Constitutional:       General: She is not in acute distress  Appearance: She is well-developed  HENT:      Head: Normocephalic and atraumatic  Mouth/Throat:      Mouth: Mucous membranes are moist    Eyes:      Conjunctiva/sclera: Conjunctivae normal    Cardiovascular:      Rate and Rhythm: Normal rate and regular rhythm  Heart sounds: No murmur heard  Pulmonary:      Effort: Pulmonary effort is normal  No respiratory distress  Breath sounds: Normal breath sounds  Abdominal:      Palpations: Abdomen is soft  Tenderness: There is no abdominal tenderness  Musculoskeletal:         General: No swelling  Cervical back: Neck supple  Skin:     General: Skin is warm  Capillary Refill: Capillary refill takes less than 2 seconds  Neurological:      Mental Status: She is alert     Psychiatric:         Mood and Affect: Mood normal        Medications have been reviewed by provider in current encounter    Varsha Haney MD

## 2023-01-06 NOTE — UTILIZATION REVIEW
NOTIFICATION OF INPATIENT ADMISSION   AUTHORIZATION REQUEST   SERVICING FACILITY:   [address] [facility], [address] [place] [address]  Tax ID: [id number]  NPI: [id number]   ATTENDING PROVIDER:  Attending Name and NPI#: Iraida Riley [[id number]]  Address: [address] [place] [address] [place]  [place], [address] [place] [address]  Phone: [phone]     ADMISSION INFORMATION:  Place of Service: Inpatient [address] [facility] Code: 21  Inpatient Admission Date/Time: 12/23/22 10:56 AM  Discharge Date/Time: 12/24/2022 12:15 PM  Admitting Diagnosis Code/Description:  Syncope [R55]  Near syncope [R55]  Atrial fibrillation, unspecified type ([address] ) [I48 91]     UTILIZATION REVIEW CONTACT:  Jono Miller Utilization   Network Utilization Review Department  Phone: [phone]  Fax: [phone]  Email: [email or web address]  Contact for approvals/pending authorizations, clinical reviews, and discharge  PHYSICIAN ADVISORY SERVICES:  Medical Necessity Denial & [employer] Review  Phone: [phone]  Fax: [phone]  Email: [email or web address]  [email or web address]           Initial Clinical Review    Admission: Date/Time/Statement:   Admission Orders (From admission, onward)     Ordered        12/23/22 1056  [facility] [place]  Once                      Orders Placed This Encounter   Procedures   • INPATIENT ADMISSION     Standing Status:   Standing     Number of Occurrences:   1     Order Specific Question:   Level of Care     Answer:   Med Surg [16]     Order Specific Question:   Estimated length of stay     Answer:   More than 2 Midnights     Order Specific Question:   Certification     Answer:   I certify that inpatient services are medically necessary for this patient for a duration of greater than two midnights  See H&P and MD Progress Notes for additional information about the patient's course of treatment       ED Arrival Information     Expected   -    Arrival   12/23/2022 08:41 Acuity   Urgent            Means of arrival   Walk-In    Escorted by   Self    Service   Hospitalist    Admission type   Emergency            Arrival complaint   near syncope           Chief Complaint   Patient presents with   • Syncope     While at work "everything went black" ears felt weird and lowered self to ground       Initial Presentation: 38 y o  female with PMH of hydronephrosis, migraine, Afib  She was at work this morning when at around 8:30 AM she suddenly syncopized  She felt lightheaded and her vision went black prior to her syncopal episode  She lowered herself to the ground and apparently lost consciousness  When she awoke a minute later had coworkers standing around her  She is back to her baseline currently but complaining of intermittent episodes of lightheadedness and palpitations  She states that she has intermittent episodes of palpitations since a young age which she has ignored  She was noted to have atrial fibrillation with rapid ventricular rate and was initiated on Cardizem-heart rate came down from the 140s to around half  However her systolic blood pressure is now between 90 and 100  She states that she always has had low blood pressures  Plan: Inpatient admission for evaluation and treatment of Afib with RVR, vasovagal syncope: currently on Cardizem drip, Echo, Cardiology consult  Date: 12/24   Day 2:     Cardiology consult: In the ED she received IV Cardizem bolus 15 mg x 1 w/subsequent GTT initiation -- weaned off at [address] last evening  Telemetry review - Converted to NSR around 1600 yesterday - now in NSR w/rates in the 60's  Follow Echo, start Cardizem  mg daily       ED Triage Vitals   Temperature Pulse Respirations Blood Pressure SpO2   12/23/22 0851 12/23/22 0851 12/23/22 0851 12/23/22 0851 12/23/22 0851   97 6 °F (36 4 °C) (!) 53 18 97/63 96 %      Temp Source Heart Rate Source Patient Position - Orthostatic VS BP Location FiO2 (%)   12/23/22 0851 12/23/22 8199 12/23/22 0851 12/23/22 0851 --   Oral Monitor Lying Right arm       Pain Score       12/23/22 1326       No Pain          Wt Readings from Last 1 Encounters:   01/04/23 63 3 kg (139 lb 9 6 oz)     Additional Vital Signs:     Date/Time Temp Pulse Resp BP MAP (mmHg) SpO2 O2 Device   12/24/22 0730 -- 70 -- 106/67 -- -- --   12/24/22 07:09:48 97 8 °F (36 6 °C) 78 -- 106/67 80 95 % --   12/24/22 03:27:39 97 8 °F (36 6 °C) 71 18 105/63 77 94 % None (Room air)   12/24/22 00:16:23 97 7 °F (36 5 °C) 71 18 110/70 83 96 % None (Room air)   12/23/22 21:24:59 98 5 °F (36 9 °C) 72 16 116/67 83 95 % --   12/23/22 20:26:39 -- 76 -- 108/80 89 95 % --   12/23/22 1930 -- -- -- -- -- -- None (Room air)   12/23/22 16:27:01 97 5 °F (36 4 °C) 86 16 96/68 77 94 % --   12/23/22 16:26:20 97 5 °F (36 4 °C) 78 16 96/68 77 94 % --   12/23/22 16:25:21 -- 84 16 96/68 77 96 % --   12/23/22 14:18:49 -- 88 -- 118/78 91 96 % --   12/23/22 13:58:20 -- 92 16 126/109 Abnormal  115 96 % --   12/23/22 1330 -- 77 -- 106/81 89 94 % --   12/23/22 13:13:58 97 5 °F (36 4 °C) 145 Abnormal  16 127/74 92 97 % None (Room air)   12/23/22 1245 -- 153 Abnormal  18 99/80 86 97 % None (Room air)   12/23/22 1200 -- 143 Abnormal  17 108/77 89 97 % None (Room air)   12/23/22 1130 -- 132 Abnormal  18 109/86 94 96 % None (Room air)   12/23/22 1100 -- 98 17 110/80 90 97 % None (Room air)   12/23/22 1030 -- 82 18 112/90 98 99 % None (Room air)   12/23/22 1015 -- 100 17 107/71 84 97 % None (Room air)   12/23/22 1001 -- 157 Abnormal  18 112/71 -- 97 % None (Room air)   12/23/22 0954 -- 154 Abnormal  17 123/93 -- 98 % None (Room air)   12/23/22 0922 -- 165 Abnormal  18 109/56 -- 98 % None (Room air)   12/23/22 0900 -- 161 Abnormal  20 97/58 77 97 % None (Room air)     Pertinent Labs/Diagnostic Test Results:   XR chest 1 view portable   Final Result by Gwenetta Leventhal, MD (12/23 1220)      No acute cardiopulmonary disease                    Workstation performed: [id number] ED Treatment:   Medication Administration from 12/23/2022 0840 to 12/23/2022 1307       Date/Time Order Dose Route Action     12/23/2022 0920 EST lactated ringers bolus 1,000 mL 1,000 mL Intravenous New Bag     12/23/2022 1002 EST diltiazem (CARDIZEM) injection 15 mg 15 mg Intravenous Given     12/23/2022 0950 EST calcium gluconate 1 g in sodium chloride 0 9% 50 mL (premix) 1 g Intravenous New Bag     12/23/2022 1142 EST diltiazem (CARDIZEM) 125 mg in sodium chloride 0 9 % 125 mL infusion 2 mg/hr Intravenous New Bag        Past Medical History:   Diagnosis Date   • COVID-19    • Diverticulosis    • Hydronephrosis    • Migraine    • PONV (postoperative nausea and vomiting)    • TMJ arthralgia     Resolved 10/29/2015    • Ureteral stricture      Present on Admission:  • Atrial fibrillation with rapid ventricular response (HCC)  • Vasovagal syncope  • MARLIN (generalized anxiety disorder)  • Migraine      Admitting Diagnosis: Syncope [R55]  Near syncope [R55]  Atrial fibrillation, unspecified type ([facility] [address] ) [I48 91]  Age/Sex: 38 y o  female  Admission Orders:  Scheduled Medications:  No current facility-administered medications for this encounter  Continuous IV Infusions:    diltiazem (CARDIZEM) 125 mg in sodium chloride 0 9 % 125 mL infusion  Dose: 1-15 mg/hr  Freq: Once Route: IV  Last Dose: 7 mg/hr (12/23/22 1319)  Start: 12/23/22 1015 End: 12/23/22 1142  No current facility-administered medications for this encounter  PRN Meds:  No current facility-administered medications for this encounter  IP CONSULT TO CARDIOLOGY    Network Utilization Review Department  ATTENTION: Please call with any questions or concerns to [phone] and carefully listen to the prompts so that you are directed to the right person   All voicemails are confidential   Jennifer Caicedo all requests for admission clinical reviews, approved or denied determinations and any other requests to dedicated fax number below belonging to the campus where the patient is receiving treatment   List of dedicated fax numbers for the Facilities:  [address] [place] [address] DENIALS (Administrative/Medical Necessity) [phone]   [address] [facility] (Maternity/NICU/Pediatrics) [phone]   [address] [phone]   [facility] [address] 77 [phone]   [address] [address] Sandeep [address] [place] [address] [phone]   1558 [facility] 134 815 [facility] [phone]

## 2023-01-10 ENCOUNTER — OFFICE VISIT (OUTPATIENT)
Dept: CARDIOLOGY CLINIC | Facility: CLINIC | Age: 43
End: 2023-01-10

## 2023-01-10 VITALS
HEART RATE: 70 BPM | SYSTOLIC BLOOD PRESSURE: 107 MMHG | DIASTOLIC BLOOD PRESSURE: 80 MMHG | OXYGEN SATURATION: 96 % | HEIGHT: 65 IN | RESPIRATION RATE: 18 BRPM | WEIGHT: 140 LBS | BODY MASS INDEX: 23.32 KG/M2

## 2023-01-10 DIAGNOSIS — Z09 HOSPITAL DISCHARGE FOLLOW-UP: Primary | ICD-10-CM

## 2023-01-10 DIAGNOSIS — Z82.49 FAMILY HISTORY OF PREMATURE CAD: ICD-10-CM

## 2023-01-10 DIAGNOSIS — I48.0 PAROXYSMAL ATRIAL FIBRILLATION (HCC): ICD-10-CM

## 2023-01-10 DIAGNOSIS — R55 NEAR SYNCOPE: ICD-10-CM

## 2023-01-10 DIAGNOSIS — Z80.0 FAMILY HISTORY OF COLON CANCER: ICD-10-CM

## 2023-01-10 DIAGNOSIS — Z13.220 SCREENING FOR LIPID DISORDERS: ICD-10-CM

## 2023-01-10 NOTE — PROGRESS NOTES
Cardiology  Hospital Follow Up   Office Visit Note  Conchita Martines   38 y o    female   MRN: [id number]  [address]  [address] [facility] [address] [facility] [place] [address]  [phone]    PCP: Susan Dailey MD  Cardiologist: Will be Dr Janny Ford            Summary of recommendations  Heart healthy diet  Educational information provided  EKG stress test  2 week event recorder, to assess for recurrent A  Fib/burden  She is currently wearing it, scheduled to remove it, and about another 7 days  Limit caffeine/alcohol  Encouraged hydration as she has been  Fasting lipid profile  Consider coronary artery calcium scoring, for risk stratification given her family history of premature CAD  Follow up will be scheduled with Dr Janny Ford 6 weeks or so  Colon Ca screenin2021, up to date      Assessment/plan  Presyncope, recent hospital admission  Patient reports he did not entirely lose consciousness but was close to it  Due to atrial fibrillation with RVR in the setting of baseline low normal blood pressures and probable relative dehydration; she admitted reduced p o  intake  PAF, non valvular new diagnosis presented with RVR  [id number]  Now on Cardizem  mg daily  Possibly precipitated by excess caffeine and EtOH in the setting of dehydration  TSH normal   Await 2-week event recorder  At this time, no absolute indication for anticoagulation  · EKG today: Normal sinus rhythm 70 bpm  /80  MVP with no significant MR  Screening lipids: will order  Family history of premature CAD  Her mother had CABG in her 46s  Low normal blood pressure Reports long history of low normal blood pressures; asymptomatic with this when at baseline  Remote light tobacco use quitting   Family hx colon CA  up-to-date on her screenings  S/P hysterectomy  Cardiac testing  • TTE 22  EF 55%  No RWMA  Diastolic function normal   Normal RV  Trace MR    Posterior leaflet is prolapsed in late systole  Atria normal sized          HPI  Maryellen Bateman is a 39 yo female with history of former light tobacco use quitting in 2007  No illicit drug use  She does have a family history of premature CAD, her mother was diagnosed in her 46s, and underwent CABG  Her maternal grandfather had an MI in his 46s  She has not followed cardiology    Adm  12/23/22-12/24/22   SLRA  CC: Near syncope while at work  Preceding symptoms of lightheadedness, warm flushing, diaphoresis, dark vision  EKG: A  fib with  bpm   BP 97/63  In the ED she received IV Cardizem 15 mg x 1 with subsequent drip  She converted spontaneously to normal sinus rhythm  HS trop unremarkable  CBC, BMP unremarkable;TSH 0 23, free T4 1 0  Followed by Cardiology  Patient reported history of palpitations for years but no prior work-up  A day prior she had 3 cups of coffee in the morning, 2 glasses of wine in the evening  She woke up on the day of admission feeling jittery and panicky  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 she had sudden onset of lightheadedness, feeling flushed and diaphoretic  Found to chair, sat down quickly  Patient reports she did not entirely lose consciousness but was very close to her protocol worker recommend she go to the ED to get checked out  Started on long-acting oral Cardizem  TTE: MVP, no MR  Preserved EF  Normal atrial size  No absolute indication for anticoagulation  Recommend outpatient exercise stress test and 2-week monitor to assess for recurrence of A  fib/burden    1/10/23  Hospital follow-up  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[employer]  She has had no recurrence of palpitations  ,  As a routine she usually exercises at the gym about 3 times a week, sometimes more  She denies any chest pain or shortness of breath  Occasionally, she notes that her heart rate is on the high side when she exercises; this has been a recent issue  She denies any exertional symptoms      She is currently wearing a 2-week Zio patch; she has about another week to go  She agrees to an EKG stress test   She will hold the Cardizem the night before  Normally she takes it in the evening  She is agreeable to a fasting lipid profile, for screening purposes  We discussed briefly, a coronary artery calcium score  We will defer this today  I gave her an educational sheet  She can further discuss this with her cardiologist   She understands that it would be out-of-pocket; it is purpose to CV risk stratify      I have spent 40 minutes with Patient and family today in which greater than 50% of this time was spent in counseling/coordination of care regarding Intructions for management, Patient and family education, Importance of tx compliance and Risk factor reductions  Assessment  Diagnoses and all orders for this visit:    Hospital discharge follow-up    Paroxysmal atrial fibrillation ([address] )    Near syncope    Family history of colon cancer          Past Medical History:   Diagnosis Date   • COVID-19    • Diverticulosis    • Hydronephrosis    • Migraine    • PONV (postoperative nausea and vomiting)    • TMJ arthralgia     Resolved 10/29/2015    • Ureteral stricture        Review of Systems   Constitutional: Negative for chills  Cardiovascular: Negative for chest pain, claudication, cyanosis, dyspnea on exertion, irregular heartbeat, leg swelling, near-syncope, orthopnea, palpitations, paroxysmal nocturnal dyspnea and syncope  Respiratory: Negative for cough and shortness of breath  Gastrointestinal: Negative for heartburn and nausea  Neurological: Negative for dizziness, focal weakness, headaches, light-headedness and weakness  All other systems reviewed and are negative  Allergies   Allergen Reactions   • Shellfish-Derived Products - Food Allergy Anaphylaxis     THROAT ITCHY MIGRAINES   • Fentanyl Itching           Current Outpatient Medications:   •  diltiazem (CARDIZEM CD) 120 mg 24 hr capsule, Take 1 capsule (120 mg total) by mouth daily, Disp: 30 capsule, Rfl: 0        Social History     Socioeconomic History   • Marital status: /Civil Union     Spouse name: Not on file   • Number of children: Not on file   • Years of education: Not on file   • Highest education level: Not on file   Occupational History   • Occupation:    Tobacco Use   • Smoking status: Former     Packs/day: 0 20     Years: 12 00     Pack years: 2 40     Types: Cigarettes     Quit date:      Years since quittin 0   • Smokeless tobacco: Never   • Tobacco comments:     quit 13 years ago   Vaping Use   • Vaping Use: Never used   Substance and Sexual Activity   • Alcohol use: Yes     Alcohol/week: 2 0 standard drinks     Types: 2 Glasses of wine per week   • Drug use: No   • Sexual activity: Yes     Partners: Male   Other Topics Concern   • Not on file   Social History Narrative   • Not on file     Social Determinants of Health     Financial Resource Strain: Not on file   Food Insecurity: Not on file   Transportation Needs: Not on file   Physical Activity: Not on file   Stress: Not on file   Social Connections: Not on file   Intimate Partner Violence: Not on file   Housing Stability: Not on file       Family History   Problem Relation Age of Onset   • Heart attack Mother    • Hypertension Mother    • Colon cancer Brother    • Diabetes type II Paternal Grandfather        Physical Exam  Vitals and nursing note reviewed  Constitutional:       General: She is not in acute distress  Appearance: She is not diaphoretic  HENT:      Head: Normocephalic and atraumatic  Eyes:      Conjunctiva/sclera: Conjunctivae normal    Cardiovascular:      Rate and Rhythm: Normal rate and regular rhythm  Pulses: Intact distal pulses  Heart sounds: Normal heart sounds  Pulmonary:      Effort: Pulmonary effort is normal       Breath sounds: Normal breath sounds     Abdominal:      General: Bowel sounds are normal       Palpations: Abdomen is soft  Musculoskeletal:         General: Normal range of motion  Cervical back: Normal range of motion and neck supple  Skin:     General: Skin is warm and dry  Neurological:      Mental Status: She is alert and oriented to person, place, and time  Vitals: There were no vitals taken for this visit  Wt Readings from Last 3 Encounters:   01/04/23 63 3 kg (139 lb 9 6 oz)   12/24/22 63 5 kg (140 lb)   09/26/22 64 kg (141 lb)         Labs & Results:  Lab Results   Component Value Date    WBC 5 85 12/23/2022    HGB 15 2 12/23/2022    HCT 43 0 12/23/2022    MCV 91 12/23/2022     12/23/2022     No results found for: BNP  No components found for: CHEM  Total CK   Date Value Ref Range Status   02/11/2022 88 26 - 192 U/L Final     No results found for this or any previous visit  No results found for this or any previous visit  This note was completed in part utilizing [employer] direct voice recognition software  Grammatical errors, random word insertion, spelling mistakes, and incomplete sentences may be an occasional consequence of the system secondary to software limitations, ambient noise and hardware issues  At the time of dictation, efforts were made to edit, clarify and /or correct errors  Please read the chart carefully and recognize, using context, where substitutions have occurred    If you have any questions or concerns about the context, text or information contained within the body of this dictation, please contact myself, the provider, for further clarification

## 2023-01-10 NOTE — LETTER
January 10, 2023     Harsha Bhatia, [address] [facility]  [address] [facility] [address]    Patient: Akin Holden   YOB: 1980   Date of Visit: 1/10/2023       Dear Dr Pili Healy:    Thank you for referring Akin Holden to me for evaluation  Below are my notes for this consultation  If you have questions, please do not hesitate to call me  I look forward to following your patient along with you  Sincerely,        JAMILA Salgado        CC: No Recipients  Inessa Salgado [address]  1/10/2023  2:15 PM  Sign when [facility] Visit  Cardiology  [facility] Follow Up   Office Visit Note  Akin Holden   38 y o    female   MRN: [id number]  [address]  [address] [facility] [address] [facility] [place] [address]  [phone]  [phone]    PCP: Harsha Bhatia MD  Cardiologist: Will be Dr Radha Luu            Summary of recommendations  Heart healthy diet  Educational information provided  EKG stress test  2 week event recorder, to assess for recurrent A  Fib/burden  She is currently wearing it, scheduled to remove it, and about another 7 days  Limit caffeine/alcohol  Encouraged hydration as she has been  Fasting lipid profile  Consider coronary artery calcium scoring, for risk stratification given her family history of premature CAD  Follow up will be scheduled with Dr Radha Luu 6 weeks or so  Colon Ca screenin2021, up to date      Assessment/plan  Presyncope, recent hospital admission  Patient reports he did not entirely lose consciousness but was close to it  Due to atrial fibrillation with RVR in the setting of baseline low normal blood pressures and probable relative dehydration; she admitted reduced p o  intake  PAF, non valvular new diagnosis presented with RVR  [id number]  Now on Cardizem  mg daily  Possibly precipitated by excess caffeine and EtOH in the setting of dehydration  TSH normal   Await 2-week event recorder    At this time, no absolute indication for anticoagulation  · EKG today: Normal sinus rhythm 70 bpm  /80  MVP with no significant MR  Screening lipids: will order  Family history of premature CAD  Her mother had CABG in her 46s  Low normal blood pressure Reports long history of low normal blood pressures; asymptomatic with this when at baseline  Remote light tobacco use quitting 2007  Family hx colon CA  up-to-date on her screenings  S/P hysterectomy  Cardiac testing  • TTE 12/24/22  EF 55%  No RWMA  Diastolic function normal   Normal RV  Trace MR  Posterior leaflet is prolapsed in late systole  Atria normal sized          HPI  Jaymie Gleason is a 39 yo female with history of former light tobacco use quitting in 2007  No illicit drug use  She does have a family history of premature CAD, her mother was diagnosed in her 46s, and underwent CABG  Her maternal grandfather had an MI in his 46s  She has not followed cardiology    Adm  12/23/22-12/24/22   ALBERTO  CC: Near syncope while at work  Preceding symptoms of lightheadedness, warm flushing, diaphoresis, dark vision  EKG: A  fib with  bpm   BP 97/63  In the ED she received IV Cardizem 15 mg x 1 with subsequent drip  She converted spontaneously to normal sinus rhythm  HS trop unremarkable  CBC, BMP unremarkable;TSH 0 23, free T4 1 0  Followed by Cardiology  Patient reported history of palpitations for years but no prior work-up  A day prior she had 3 cups of coffee in the morning, 2 glasses of wine in the evening  She woke up on the day of admission feeling jittery and panicky  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 she had sudden onset of lightheadedness, feeling flushed and diaphoretic  Found to chair, sat down quickly  Patient reports she did not entirely lose consciousness but was very close to her protocol worker recommend she go to the ED to get checked out  Started on long-acting oral Cardizem  TTE: MVP, no MR  Preserved EF    Normal atrial size  No absolute indication for anticoagulation  Recommend outpatient exercise stress test and 2-week monitor to assess for recurrence of A  fib/burden    1/10/23  Hospital follow-up  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[employer]  She has had no recurrence of palpitations  ,  As a routine she usually exercises at the gym about 3 times a week, sometimes more  She denies any chest pain or shortness of breath  Occasionally, she notes that her heart rate is on the high side when she exercises; this has been a recent issue  She denies any exertional symptoms  She is currently wearing a 2-week Zio patch; she has about another week to go  She agrees to an EKG stress test   She will hold the Cardizem the night before  Normally she takes it in the evening  She is agreeable to a fasting lipid profile, for screening purposes  We discussed briefly, a coronary artery calcium score  We will defer this today  I gave her an educational sheet  She can further discuss this with her cardiologist   She understands that it would be out-of-pocket; it is purpose to CV risk stratify      I have spent 40 minutes with Patient and family today in which greater than 50% of this time was spent in counseling/coordination of care regarding Intructions for management, Patient and family education, Importance of tx compliance and Risk factor reductions  Assessment  Diagnoses and all orders for this visit:    Hospital discharge follow-up    Paroxysmal atrial fibrillation ([address] )    Near syncope    Family history of colon cancer          Past Medical History:   Diagnosis Date   • COVID-19    • Diverticulosis    • Hydronephrosis    • Migraine    • PONV (postoperative nausea and vomiting)    • TMJ arthralgia     Resolved 10/29/2015    • Ureteral stricture        Review of Systems   Constitutional: Negative for chills     Cardiovascular: Negative for chest pain, claudication, cyanosis, dyspnea on exertion, irregular heartbeat, leg swelling, near-syncope, orthopnea, palpitations, paroxysmal nocturnal dyspnea and syncope  Respiratory: Negative for cough and shortness of breath  Gastrointestinal: Negative for heartburn and nausea  Neurological: Negative for dizziness, focal weakness, headaches, light-headedness and weakness  All other systems reviewed and are negative  Allergies   Allergen Reactions   • Shellfish-Derived Products - Food Allergy Anaphylaxis     THROAT ITCHY MIGRAINES   • Fentanyl Itching       Current Outpatient Medications:   •  diltiazem (CARDIZEM CD) 120 mg 24 hr capsule, Take 1 capsule (120 mg total) by mouth daily, Disp: 30 capsule, Rfl: 0        Social History     Socioeconomic History   • Marital status: /Civil Union     Spouse name: Not on file   • Number of children: Not on file   • Years of education: Not on file   • Highest education level: Not on file   Occupational History   • Occupation:    Tobacco Use   • Smoking status: Former     Packs/day: 0 20     Years: 12 00     Pack years: 2 40     Types: Cigarettes     Quit date:      Years since quittin 0   • Smokeless tobacco: Never   • Tobacco comments:     quit 13 years ago   Vaping Use   • Vaping Use: Never used   Substance and Sexual Activity   • Alcohol use:  Yes     Alcohol/week: 2 0 standard drinks     Types: 2 Glasses of wine per week   • Drug use: No   • Sexual activity: Yes     Partners: Male   Other Topics Concern   • Not on file   Social History Narrative   • Not on file     Social Determinants of Health     Financial Resource Strain: Not on file   Food Insecurity: Not on file   Transportation Needs: Not on file   Physical Activity: Not on file   Stress: Not on file   Social Connections: Not on file   Intimate Partner Violence: Not on file   Housing Stability: Not on file       Family History   Problem Relation Age of Onset   • Heart attack Mother    • Hypertension Mother    • Colon cancer Brother    • Diabetes type II Paternal Grandfather        Physical Exam  Vitals and nursing note reviewed  Constitutional:       General: She is not in acute distress  Appearance: She is not diaphoretic  HENT:      Head: Normocephalic and atraumatic  Eyes:      Conjunctiva/sclera: Conjunctivae normal    Cardiovascular:      Rate and Rhythm: Normal rate and regular rhythm  Pulses: Intact distal pulses  Heart sounds: Normal heart sounds  Pulmonary:      Effort: Pulmonary effort is normal       Breath sounds: Normal breath sounds  Abdominal:      General: Bowel sounds are normal       Palpations: Abdomen is soft  Musculoskeletal:         General: Normal range of motion  Cervical back: Normal range of motion and neck supple  Skin:     General: Skin is warm and dry  Neurological:      Mental Status: She is alert and oriented to person, place, and time  Vitals: There were no vitals taken for this visit  Wt Readings from Last 3 Encounters:   01/04/23 63 3 kg (139 lb 9 6 oz)   12/24/22 63 5 kg (140 lb)   09/26/22 64 kg (141 lb)         Labs & Results:  Lab Results   Component Value Date    WBC 5 85 12/23/2022    HGB 15 2 12/23/2022    HCT 43 0 12/23/2022    MCV 91 12/23/2022     12/23/2022     No results found for: BNP  No components found for: CHEM  Total CK   Date Value Ref Range Status   02/11/2022 88 26 - 192 U/L Final     No results found for this or any previous visit  No results found for this or any previous visit  This note was completed in part utilizing [employer] direct voice recognition software  Grammatical errors, random word insertion, spelling mistakes, and incomplete sentences may be an occasional consequence of the system secondary to software limitations, ambient noise and hardware issues  At the time of dictation, efforts were made to edit, clarify and /or correct errors  Please read the chart carefully and recognize, using context, where substitutions have occurred    If you have any questions or concerns about the context, text or information contained within the body of this dictation, please contact myself, the provider, for further clarification

## 2023-01-25 ENCOUNTER — CLINICAL SUPPORT (OUTPATIENT)
Dept: CARDIOLOGY CLINIC | Facility: CLINIC | Age: 43
End: 2023-01-25

## 2023-01-25 DIAGNOSIS — I48.91 ATRIAL FIBRILLATION, UNSPECIFIED TYPE (HCC): ICD-10-CM

## 2023-02-16 ENCOUNTER — HOSPITAL ENCOUNTER (OUTPATIENT)
Dept: NON INVASIVE DIAGNOSTICS | Facility: CLINIC | Age: 43
Discharge: HOME | End: 2023-02-16

## 2023-02-16 VITALS — BODY MASS INDEX: 23.32 KG/M2 | HEIGHT: 65 IN | WEIGHT: 140 LBS

## 2023-02-16 DIAGNOSIS — Z82.49 FAMILY HISTORY OF PREMATURE CAD: ICD-10-CM

## 2023-02-16 DIAGNOSIS — I48.0 PAROXYSMAL ATRIAL FIBRILLATION (HCC): ICD-10-CM

## 2023-02-16 LAB
MAX HR PERCENT: 92 %
MAX HR: 162 BPM
RATE PRESSURE PRODUCT: NORMAL
SL CV STRESS RECOVERY BP: NORMAL MMHG
SL CV STRESS RECOVERY HR: 96 BPM
SL CV STRESS RECOVERY O2 SAT: 95 %
SL CV STRESS STAGE REACHED: 4
STRESS ANGINA INDEX: 0
STRESS BASELINE BP: NORMAL MMHG
STRESS BASELINE HR: 83 BPM
STRESS DUKE TREADMILL SCORE: 12
STRESS O2 SAT REST: 97 %
STRESS PEAK HR: 162 BPM
STRESS POST ESTIMATED WORKLOAD: 13.4 METS
STRESS POST EXERCISE DUR MIN: 12 MIN
STRESS POST O2 SAT PEAK: 94 %
STRESS POST PEAK BP: 158 MMHG
STRESS ST DEPRESSION: 0 MM

## 2023-02-17 LAB
CHEST PAIN STATEMENT: NORMAL
MAX DIASTOLIC BP: 78 MMHG
MAX HEART RATE: 162 BPM
MAX PREDICTED HEART RATE: 176 BPM
MAX. SYSTOLIC BP: 160 MMHG
PROTOCOL NAME: NORMAL
REASON FOR TERMINATION: NORMAL
TARGET HR FORMULA: NORMAL
TEST INDICATION: NORMAL
TIME IN EXERCISE PHASE: NORMAL

## 2023-03-06 ENCOUNTER — OFFICE VISIT (OUTPATIENT)
Dept: CARDIOLOGY CLINIC | Facility: CLINIC | Age: 43
End: 2023-03-06

## 2023-03-06 VITALS
BODY MASS INDEX: 23.63 KG/M2 | SYSTOLIC BLOOD PRESSURE: 110 MMHG | OXYGEN SATURATION: 98 % | HEIGHT: 65 IN | WEIGHT: 141.8 LBS | DIASTOLIC BLOOD PRESSURE: 78 MMHG | HEART RATE: 73 BPM

## 2023-03-06 DIAGNOSIS — I48.0 PAROXYSMAL ATRIAL FIBRILLATION (HCC): Primary | ICD-10-CM

## 2023-03-06 DIAGNOSIS — Z82.49 FAMILY HISTORY OF PREMATURE CAD: ICD-10-CM

## 2023-03-06 NOTE — PROGRESS NOTES
[facility]    CHIEF COMPLAINT:   Chief Complaint   Patient presents with   • Follow-up     6 wk f/u        HPI:  Aurea Chambers is a 38 y o  female with a past medical history of an episode of paroxysmal atrial fibrillation (12/2022) who presents for follow up  Briefly, she was admitted to [address] [place] [address] on 12/23/2022 with presyncope  She had consumed 3 cups of coffee and 2 glasses of wine the day prior  The morning of her presentation she was feeling panic and jittery  While she was at work that morning she developed lightheadedness, a warm flushing sensation, lightheadedness, diaphoresis, and blackening of her vision  +presyncopal but no loss of consciousness  Her ECG revealed atrial fibrillation with rapid ventricular response  She converted to sinus rhythm with Cardizem and was started on Cardizem  mg daily  Interval history: Exercising several days per week  She feels well and denies any exertional symptoms of lightheadedness, palpitations, chest pain, shortness of breath  She had a 2-week cardiac monitor and exercise stress test performed both of which were unremarkable  She has been out of Cardizem for approximately 2 weeks and has had no notable symptoms  He feels well and denies any fever, chills, fatigue, visual changes, presyncope or syncope  Social history: Former light smoking history  Socially drinks alcohol  No illicit drugs  Family history: Premature coronary artery disease-mother had CABG in her 46s, maternal grandfather with MI in 46s      The following portions of the patient's history were reviewed and updated as appropriate: allergies, current medications, past family history, past medical history, past social history, past surgical history, and problem list     SINCE LAST OV I REVIEWED WITH THE PATIENT THE INTERIM LABS, TEST RESULTS, CONSULTANT(S) NOTES AND PERFORMED AN INTERIM REVIEW OF HISTORY    Past Medical History:   Diagnosis Date • COVID-19    • Diverticulosis    • Hydronephrosis    • Migraine    • PONV (postoperative nausea and vomiting)    • TMJ arthralgia     Resolved 10/29/2015    • Ureteral stricture        Past Surgical History:   Procedure Laterality Date   • APPENDECTOMY     • CYSTOSCOPY W/ LASER LITHOTRIPSY Right 11/2/2017    Procedure: CYSTOSCOPY;  RIGHT URETEROSCOPY WITH  HOLMIUM LASER INCISION OF URETERAL STRICTURE; (WITH HYDROSTATIC RIGHT URETERAL DILATION), BILATERAL RETROGRADE PYELOGRAM AND INSERTION OF RIGHT URETERAL STENT X 2 (4 7 X 26); Surgeon: Susan Petty MD;  [facility];  Service: Urology   • DILATION AND CURETTAGE OF UTERUS N/A 12/30/2016    Procedure: DILATATION AND CURETTAGE;  Surgeon: Ryan Sigala MD;  [facility];  Service:    • DILATION AND EVACUATION     • HYSTERECTOMY N/A 5/22/2017    Procedure: EXAM UNDER ANESTHESIA;  Surgeon: Ryan Sigala MD;  [facility];  Service:    • HYSTEROSCOPY N/A 12/30/2016    Procedure: HYSTEROSCOPY;  Surgeon: Ryan Sigala MD;  [facility];  Service:    • MD CYSTO BLADDER W/URETERAL CATHETERIZATION Bilateral 3/27/2017    Procedure: CYSTOSCOPY WITH RETROGRADE PYELOGRAM; RIGHT STENT INSERTION;  Surgeon: Susan Petty MD;  [facility];  Service: Urology   • MD CYSTO BLADDER W/URETERAL CATHETERIZATION N/A 1/11/2018    Procedure: CYSTOSCOPY , BILATERAL RETROGRADE PYELOGRAM WITH RIGHT STENT EXTRACTION;  Surgeon: Susan Petty MD;  [facility];  Service: Urology   • MD CYSTO BLADDER W/URETERAL CATHETERIZATION Right 1/15/2018    Procedure: CYSTOSCOPY, RIGHT RETROGRADE PYELOGRAM WITH INSERTION OF RIGHT STENT URETERAL;  Surgeon: Susan Petty MD;  [facility];  Service: Urology   • MD LAPAROSCOPY W TOTAL HYSTERECTOMY UTERUS 250 GM/< N/A 3/24/2017    Procedure: TOTAL LAPAROSCOPIC HYSTERECTOMY   bilateral salpingectomy, cysto;   Surgeon: Ryan Sigala MD;  [facility];  Service: Gynecology   • REIMPLANT URETER IN BLADDER Right 2018    Procedure: URETERAL NEOCYSTOSTOMY, EXCISION OF URETERAL STRICTURE ;  Surgeon: Rafa Justice MD;  [facility];  Service: Urology   • URETERAL STENT PLACEMENT Right 2018    Procedure: INSERTION STENT URETERAL;  Surgeon: Rafa Justice MD;  [facility];  Service: Urology       Social History     Socioeconomic History   • Marital status: /Civil Union     Spouse name: Not on file   • Number of children: Not on file   • Years of education: Not on file   • Highest education level: Not on file   Occupational History   • Occupation:    Tobacco Use   • Smoking status: Former     Packs/day: 0 20     Years: 12 00     Pack years: 2 40     Types: Cigarettes     Quit date:      Years since quittin 1   • Smokeless tobacco: Never   • Tobacco comments:     quit 13 years ago   Vaping Use   • Vaping Use: Never used   Substance and Sexual Activity   • Alcohol use:  Yes     Alcohol/week: 2 0 standard drinks     Types: 2 Glasses of wine per week   • Drug use: No   • Sexual activity: Yes     Partners: Male   Other Topics Concern   • Not on file   Social History Narrative   • Not on file     Social Determinants of Health     Financial Resource Strain: Not on file   Food Insecurity: Not on file   Transportation Needs: Not on file   Physical Activity: Not on file   Stress: Not on file   Social Connections: Not on file   Intimate Partner Violence: Not on file   Housing Stability: Not on file       Family History   Problem Relation Age of Onset   • Heart attack Mother    • Hypertension Mother    • Colon cancer Brother    • Diabetes type II Paternal Grandfather        Allergies   Allergen Reactions   • Shellfish-Derived Products - Food Allergy Anaphylaxis     THROAT ITCHY MIGRAINES   • Fentanyl Itching       Current Outpatient Medications   Medication Sig Dispense Refill   • diltiazem (CARDIZEM CD) 120 mg 24 hr capsule Take 1 capsule (120 mg total) by mouth daily 30 capsule 0     No current facility-administered medications for this visit  /78 (BP Location: Left arm, Patient Position: Sitting, Cuff Size: Adult)   Pulse 73   Ht 5' 5" (1 651 m)   Wt 64 3 kg (141 lb 12 8 oz)   SpO2 98%   BMI 23 60 kg/m²     Review of Systems   All other systems reviewed and are negative  Physical Exam  Vitals reviewed  Constitutional:       General: She is not in acute distress  Appearance: She is well-developed  She is not toxic-appearing  HENT:      Head: Normocephalic and atraumatic  Eyes:      General: No scleral icterus  Conjunctiva/sclera: Conjunctivae normal    Neck:      Vascular: No carotid bruit  Cardiovascular:      Rate and Rhythm: Normal rate and regular rhythm  Pulses: Normal pulses  Heart sounds: Normal heart sounds  No murmur heard  Pulmonary:      Effort: Pulmonary effort is normal  No respiratory distress  Breath sounds: Normal breath sounds  Abdominal:      General: Abdomen is flat  Bowel sounds are normal  There is no distension  Palpations: Abdomen is soft  Tenderness: There is no abdominal tenderness  Musculoskeletal:         General: No swelling  Right lower leg: No edema  Left lower leg: No edema  Skin:     General: Skin is warm and dry  Capillary Refill: Capillary refill takes less than 2 seconds  Coloration: Skin is not jaundiced or pale  Neurological:      Mental Status: She is alert     Psychiatric:         Mood and Affect: Mood normal           Lab Results   Component Value Date     10/03/2014    K 3 7 12/23/2022     12/23/2022    CO2 22 12/23/2022    BUN 16 12/23/2022    CREATININE 0 69 12/23/2022    GLUCOSE 81 10/03/2014    CALCIUM 9 3 12/23/2022    ALT 13 12/23/2022    AST 20 12/23/2022    INR 1 12 03/22/2018       Lab Results   Component Value Date    CHOL 168 10/03/2014    HDL 91 10/03/2014    [address] Cannot Calculat 10/03/2014    TRIG <30 10/03/2014       Lab Results   Component Value Date    WBC 5 85 12/23/2022    HGB 15 2 12/23/2022    HCT 43 0 12/23/2022     12/23/2022       Cardiac studies:   EST-2/16/2023: Umer protocol stress test performed  She reached stage IV of the protocol after exercising 12 minutes and had a maximal heart rate of 162 bpm (92% of MPHR) and 13 4 METS  She reported no symptoms during the test   The ECG was negative for ischemia  ZIO XT (1/3/2023- 1/17/2023): Patient had a min HR of 28 bpm, max HR of 154 bpm, and avg HR of 79 bpm  Predominant underlying rhythm was Sinus Rhythm  Second Degree AV Block-Mobitz I (Wenckebach) was present  Isolated SVEs were rare (<1 0%), and no SVE Couplets or SVE Triplets were present  Isolated VEs were rare (<1 0%), VE Couplets were rare (<1 0%), and no VE Triplets were present  Ventricular Bigeminy and Trigeminy were present  TTE-12/24/2022: Normal left ventricular cavity size and wall thickness  Normal systolic function and wall motion  LVEF 55%  Normal diastolic function  Normal right ventricular cavity size and systolic function  Normal biatrial size  Mobile interatrial septum without aneurysm  No PFO by color Doppler  Mitral valve posterior leaflet prolapse in late systole with trace regurgitation  ASSESSMENT AND PLAN:  Lynette Nguyễn was seen today for follow-up  Diagnoses and all orders for this visit:    Paroxysmal atrial fibrillation [facility]): For now, she would like to monitor herself off Cardizem which is reasonable  Her atrial fibrillation was likely in the setting of dehydration and caffeine and alcohol intake  Her 2 week cardiac monitor showed no atrial fibrillation or atrial flutter  Continue to limit caffeine and alcohol intake  Discussed wearable monitors, watches, Brenda Overall mobile should she want to monitor for recurrence      Family history of premature CAD  -Check lipid panel for further risk stratification  -If within the intermediate risk range, we discussed the role of coronary calcium scoring    Gretel Mortimer, MD

## 2023-03-06 NOTE — PATIENT INSTRUCTIONS
You were seen today in the Cardiology office for follow up  Please have a lipid panel performed with the rest of your blood work  Thank you for choosing [address]  Please call our office or use [employer] with any questions

## 2023-03-27 ENCOUNTER — HOSPITAL ENCOUNTER (OUTPATIENT)
Dept: RADIOLOGY | Facility: HOSPITAL | Age: 43
Discharge: HOME | End: 2023-03-27
Attending: INTERNAL MEDICINE

## 2023-03-27 DIAGNOSIS — M25.532 BILATERAL WRIST PAIN: ICD-10-CM

## 2023-03-27 DIAGNOSIS — M25.531 BILATERAL WRIST PAIN: ICD-10-CM

## 2023-03-29 ENCOUNTER — OFFICE VISIT (OUTPATIENT)
Dept: FAMILY MEDICINE CLINIC | Facility: CLINIC | Age: 43
End: 2023-03-29

## 2023-03-29 VITALS
RESPIRATION RATE: 16 BRPM | HEART RATE: 61 BPM | TEMPERATURE: 97 F | SYSTOLIC BLOOD PRESSURE: 106 MMHG | BODY MASS INDEX: 23.16 KG/M2 | WEIGHT: 139.2 LBS | OXYGEN SATURATION: 97 % | DIASTOLIC BLOOD PRESSURE: 72 MMHG

## 2023-03-29 DIAGNOSIS — F41.1 GAD (GENERALIZED ANXIETY DISORDER): ICD-10-CM

## 2023-03-29 DIAGNOSIS — I48.91 ATRIAL FIBRILLATION WITH RAPID VENTRICULAR RESPONSE (HCC): ICD-10-CM

## 2023-03-29 DIAGNOSIS — N95.1 PERIMENOPAUSAL VASOMOTOR SYMPTOMS: Primary | ICD-10-CM

## 2023-03-29 NOTE — PATIENT INSTRUCTIONS
Clinical recommendation Daily dosages used in studies Study durations Evidence rating References   Black cohosh may be effective for short-term treatment of hot flashes  16 to 127 mg Eight weeks to one year B 29-33,40   Clonidine (Catapres) is an effective option for treating hot flashes  0 1 mg Eight to 12 weeks B 10-12   Fluoxetine (Prozac) is an effective option for treating hot flashes, based on limited evidence  20 mg Nine weeks B 5   Paroxetine (Paxil) is an effective option for treating hot flashes  20 to 40 mg Four weeks B 3,4   Soy and other isoflavones may be helpful in the short-term treatment of hot flashes  40 to 164 mg Seven to 12 weeks B 16,19,20,28   Venlafaxine (Effexor) is an effective option for treating hot flashes   37 5 to 150 mg Four to 12 weeks B 7-9     Supplements include:   Black cohosh  Dong quai  Evening primrose oil  Ginseng  Melatonin  Red clover isoflavones  Soy isoflavones

## 2023-03-29 NOTE — PROGRESS NOTES
Name: Katarzyna Desir      : 1980      MRN: [id number]  Encounter Provider: Dacrie Hernandez MD  Encounter Date: 3/29/2023   Encounter department: [facility]     1  Perimenopausal vasomotor symptoms  Comments:  Started several months ago  S/p hysterectomy but still has her period  Likely perimenopausal  Provided list of supplements that may help (black cohosh and  primrose oil)     2  Atrial fibrillation with rapid ventricular response (HCC)  Assessment & Plan:  Thought to be provoked by dehydration and excessive caffeine  No longer taking Cardizem  Saw cardiology and had Zio patch that showed no arrhythmias         3  MARLIN (generalized anxiety disorder)  Assessment & Plan:  Mood is stable off medications            Subjective      Seen today for follow up  Doing well  Started experiencing hot flashes a few months ago  Saw cardiology and no longer taking Cardizem  Review of Systems   Respiratory: Negative for shortness of breath  Cardiovascular: Negative for chest pain and palpitations  Endocrine: Positive for heat intolerance  Neurological: Negative for syncope and light-headedness  Psychiatric/Behavioral: The patient is not nervous/anxious  No current outpatient medications on file prior to visit  Objective     /72   Pulse 61   Temp (!) 97 °F (36 1 °C)   Resp 16   Wt 63 1 kg (139 lb 3 2 oz)   SpO2 97%   BMI 23 16 kg/m²     Physical Exam  Vitals reviewed  Constitutional:       General: She is not in acute distress  Appearance: Normal appearance  She is not ill-appearing or toxic-appearing  HENT:      Head: Normocephalic and atraumatic  Eyes:      Extraocular Movements: Extraocular movements intact  Cardiovascular:      Rate and Rhythm: Normal rate and regular rhythm  Heart sounds: No murmur heard  Pulmonary:      Effort: Pulmonary effort is normal    Lymphadenopathy:      Cervical: No cervical adenopathy  Skin:     General: Skin is warm  Neurological:      Mental Status: She is alert and oriented to person, place, and time  Psychiatric:         Mood and Affect: Mood normal          Behavior: Behavior normal          Thought Content:  Thought content normal          Judgment: Judgment normal        Darryl Rocha MD

## 2023-03-29 NOTE — ASSESSMENT & PLAN NOTE
Thought to be provoked by dehydration and excessive caffeine  No longer taking Cardizem  Saw cardiology and had Zio patch that showed no arrhythmias

## 2023-06-02 ENCOUNTER — TELEPHONE (OUTPATIENT)
Dept: FAMILY MEDICINE CLINIC | Facility: CLINIC | Age: 43
End: 2023-06-02

## 2023-06-02 DIAGNOSIS — H00.019 HORDEOLUM EXTERNUM, UNSPECIFIED LATERALITY: Primary | ICD-10-CM

## 2023-06-02 RX ORDER — ERYTHROMYCIN 5 MG/G
0.5 OINTMENT OPHTHALMIC
Qty: 7 G | Refills: 0 | Status: SHIPPED | OUTPATIENT
Start: 2023-06-02 | End: 2023-06-09

## 2023-06-02 NOTE — TELEPHONE ENCOUNTER
Pt was previously prescribed eye drops and erythromycin (ILOTYCIN) ophthalmic ointment       for a stye she had  She feels an itchiness in her eye and feels like she may get another stye which causes her eyelid to swell    Pt unable to come in for visit due to not having her car   Asks if the drops and ointment can be sent to pharmacy below    If she needs a visit, can she do virtual?    Please advise    [facility]/pharmacy #[id number] [address]  [address] [place] [address]  Phone: [phone] Fax: [phone]

## 2023-07-06 ENCOUNTER — APPOINTMENT (OUTPATIENT)
Dept: LAB | Facility: AMBULARY SURGERY CENTER | Age: 43
End: 2023-07-06
Payer: COMMERCIAL

## 2023-07-06 ENCOUNTER — OFFICE VISIT (OUTPATIENT)
Dept: FAMILY MEDICINE CLINIC | Facility: CLINIC | Age: 43
End: 2023-07-06
Payer: COMMERCIAL

## 2023-07-06 VITALS
BODY MASS INDEX: 19.93 KG/M2 | SYSTOLIC BLOOD PRESSURE: 118 MMHG | WEIGHT: 119.6 LBS | RESPIRATION RATE: 14 BRPM | DIASTOLIC BLOOD PRESSURE: 80 MMHG | HEART RATE: 82 BPM | TEMPERATURE: 97.1 F | OXYGEN SATURATION: 98 % | HEIGHT: 65 IN

## 2023-07-06 DIAGNOSIS — F43.0 STRESS REACTION: ICD-10-CM

## 2023-07-06 DIAGNOSIS — Z11.3 SCREEN FOR STD (SEXUALLY TRANSMITTED DISEASE): Primary | ICD-10-CM

## 2023-07-06 DIAGNOSIS — Z11.3 SCREEN FOR STD (SEXUALLY TRANSMITTED DISEASE): ICD-10-CM

## 2023-07-06 LAB
HIV 1+2 AB+HIV1 P24 AG SERPL QL IA: NORMAL
HIV 2 AB SERPL QL IA: NORMAL
HIV1 AB SERPL QL IA: NORMAL
HIV1 P24 AG SERPL QL IA: NORMAL
TREPONEMA PALLIDUM IGG+IGM AB [PRESENCE] IN SERUM OR PLASMA BY IMMUNOASSAY: NORMAL
TSH SERPL DL<=0.05 MIU/L-ACNC: 0.58 UIU/ML (ref 0.45–4.5)

## 2023-07-06 PROCEDURE — 87389 HIV-1 AG W/HIV-1&-2 AB AG IA: CPT

## 2023-07-06 PROCEDURE — 86780 TREPONEMA PALLIDUM: CPT

## 2023-07-06 PROCEDURE — 84443 ASSAY THYROID STIM HORMONE: CPT

## 2023-07-06 PROCEDURE — 87591 N.GONORRHOEAE DNA AMP PROB: CPT

## 2023-07-06 PROCEDURE — 99213 OFFICE O/P EST LOW 20 MIN: CPT | Performed by: FAMILY MEDICINE

## 2023-07-06 PROCEDURE — 87491 CHLMYD TRACH DNA AMP PROBE: CPT

## 2023-07-06 PROCEDURE — 36415 COLL VENOUS BLD VENIPUNCTURE: CPT
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
## 2023-07-07 LAB
C TRACH DNA SPEC QL NAA+PROBE: NEGATIVE
N GONORRHOEA DNA SPEC QL NAA+PROBE: NEGATIVE

## 2023-07-08 ENCOUNTER — PATIENT MESSAGE (OUTPATIENT)
Dept: FAMILY MEDICINE CLINIC | Facility: CLINIC | Age: 43
End: 2023-07-08

## 2023-07-08 DIAGNOSIS — F41.8 SITUATIONAL ANXIETY: ICD-10-CM

## 2023-07-08 DIAGNOSIS — F41.1 GAD (GENERALIZED ANXIETY DISORDER): Primary | ICD-10-CM

## 2023-07-10 ENCOUNTER — TELEPHONE (OUTPATIENT)
Dept: FAMILY MEDICINE CLINIC | Facility: CLINIC | Age: 43
End: 2023-07-10
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
## 2023-07-11 RX ORDER — FLUOXETINE 10 MG/1
10 CAPSULE ORAL DAILY
Qty: 30 CAPSULE | Refills: 1 | Status: SHIPPED | OUTPATIENT
Start: 2023-07-11

## 2023-08-01 NOTE — PROGRESS NOTES
Assessment and Plan:   Ms. Ramses Figueroa is a 33-year-old female with no significant past medical history who presents for a follow up of joint pains and swelling.      - Mozella Sport presents today for a follow up of intermittent joint pain and swelling which had been occurring from 2021 till approximately mid 2022. An evaluation for inflammatory arthritis with serologies as well as an ultrasound of her hands and wrists was unrevealing. She reports since our last visit the joint complaints have actually resolved spontaneously. She thinks it may have been secondary to an allergic reaction from eyelash adhesive. She will make me aware if her symptoms recur and if she requires a reevaluation for an inflammatory arthropathy. Plan:  Diagnoses and all orders for this visit:    Polyarthralgia  -     Sjogren's Antibodies; Future  -     Sedimentation rate, automated; Future  -     C-reactive protein; Future  -     Ferritin; Future  -     HLA-B27 antigen; Future      I have personally reviewed pertinent films in PACS of the right ankle XR which is normal.       Activities as tolerated. Continue other medications as prescribed by PCP and other specialists. RTC PRN. HPI     INITIAL VISIT NOTE (4/2022):  Ms. Ramses Figueroa is a 33-year-old female with no significant past medical history who presents for an evaluation of joint pains and swelling. She is referred by Dr. Milton Gomez for a rheumatology consult. Patient reports over the past year she has had occurrences of joint pains and swelling which has intermittently affected her wrists, shoulders, legs, knees and feet. She reports these episodes can occur every few weeks without any aggravating factors she has been able to identify (possible association with consumption of steak that she noticed with 2 episodes) and will resolve spontaneously within a few days. In between episodes she is asymptomatic and does not have chronic joint pains and swelling.   She reports usually one joint will be affected at a time. She has not had any significant involvement in her hands, elbows, hips or ankles. Apart from affecting her in the joints themselves she has also noticed swelling in her lower neck region near the shoulder blade as well as in her thigh. No stiffness of the joints. She will take Tylenol, over-the-counter NSAID or prescription naproxen as needed which does help her. She reports infrequent painful canker sores. She has a history of pityriasis rosea and will get a rash or hive-like appearance only on her torso. No history of fevers, unintentional weight loss, alopecia, significant dry eye/dry mouth, inflammatory eye disease, psoriasis, photosensitivity, nose ulcers, swollen glands, chest pain, shortness of breath, inflammatory bowel disease, blood clots, Raynaud's or a family history of autoimmune disease. She reports a sinus infection last year but is unable to recall if this was prior to the onset of her symptoms. She does not get recurrent sinus infections. No major comorbid health issues and no new medications. She was seen by her primary care physician for her symptoms and had testing done which showed an unremarkable BRETT, CRP, rheumatoid factor, anti CCP antibody, CK, Lyme antibody profile and uric acid level. 8/2/2023:  Patient presents for a follow-up today. I reviewed the ultrasound of her hands and wrists done after the last visit which did not show any signs of an inflammatory arthritis. Mild osteoarthritis was noted. She mentions since the last visit she has actually been feeling well and reports the occurrences of joint pains and swelling she was experiencing have completely resolved. She thinks she may have been experiencing an allergic reaction to eyelash adhesive as after she stopped getting false eyelashes the joint symptoms have not occurred. No new complaints today.     The following portions of the patient's history were reviewed and updated as appropriate: allergies, current medications, past family history, past medical history, past social history, past surgical history and problem list.      Review of Systems  Constitutional: Negative for weight change, fevers, chills, night sweats, fatigue. ENT/Mouth: Negative for hearing changes, ear pain, nasal congestion, sinus pain, hoarseness, sore throat, rhinorrhea, swallowing difficulty. Eyes: Negative for pain, redness, discharge, vision changes. Cardiovascular: Negative for chest pain, SOB, palpitations. Respiratory: Negative for cough, sputum, wheezing, dyspnea. Gastrointestinal: Negative for nausea, vomiting, diarrhea, constipation, pain, heartburn. Genitourinary: Negative for dysuria, urinary frequency, hematuria. Musculoskeletal: As per HPI. Skin: Negative for skin rash, color changes. Neuro: Negative for weakness, numbness, tingling, loss of consciousness. Psych: Negative for anxiety, depression. Heme/Lymph: Negative for easy bruising, bleeding, lymphadenopathy. Past Medical History:   Diagnosis Date   • COVID-19    • Diverticulosis    • Hydronephrosis    • Migraine    • PONV (postoperative nausea and vomiting)    • TMJ arthralgia     Resolved 10/29/2015    • Ureteral stricture        Past Surgical History:   Procedure Laterality Date   • APPENDECTOMY     • CYSTOSCOPY W/ LASER LITHOTRIPSY Right 11/2/2017    Procedure: CYSTOSCOPY;  RIGHT URETEROSCOPY WITH  HOLMIUM LASER INCISION OF URETERAL STRICTURE; (WITH HYDROSTATIC RIGHT URETERAL DILATION), BILATERAL RETROGRADE PYELOGRAM AND INSERTION OF RIGHT URETERAL STENT X 2 (4.7 X 26);   Surgeon: Ines Cockayne, MD;  [facility];  Service: Urology   • DILATION AND CURETTAGE OF UTERUS N/A 12/30/2016    Procedure: DILATATION AND CURETTAGE;  Surgeon: Sarbjit Royal MD;  [facility];  Service:    • DILATION AND EVACUATION     • HYSTERECTOMY N/A 5/22/2017    Procedure: EXAM UNDER ANESTHESIA;  Surgeon: Morales Duran Chapito Castillo MD;  [facility];  Service:    • HYSTEROSCOPY N/A 2016    Procedure: HYSTEROSCOPY;  Surgeon: Shelbie Parsons MD;  [facility];  Service:    • NY CYSTO BLADDER W/URETERAL CATHETERIZATION Bilateral 3/27/2017    Procedure: CYSTOSCOPY WITH RETROGRADE PYELOGRAM; RIGHT STENT INSERTION;  Surgeon: Robe Meyers MD;  [facility];  Service: Urology   • NY CYSTO BLADDER W/URETERAL CATHETERIZATION N/A 2018    Procedure: CYSTOSCOPY , BILATERAL RETROGRADE PYELOGRAM WITH RIGHT STENT EXTRACTION;  Surgeon: Robe Meyers MD;  [facility];  Service: Urology   • NY CYSTO BLADDER W/URETERAL CATHETERIZATION Right 1/15/2018    Procedure: CYSTOSCOPY, RIGHT RETROGRADE PYELOGRAM WITH INSERTION OF RIGHT STENT URETERAL;  Surgeon: Robe Meyers MD;  [facility];  Service: Urology   • NY LAPAROSCOPY W TOTAL HYSTERECTOMY UTERUS 250 GM/< N/A 3/24/2017    Procedure: TOTAL LAPAROSCOPIC HYSTERECTOMY . bilateral salpingectomy, cysto;   Surgeon: Shelbie Parsons MD;  [facility];  Service: Gynecology   • REIMPLANT URETER IN BLADDER Right 2018    Procedure: URETERAL NEOCYSTOSTOMY, EXCISION OF URETERAL STRICTURE.;  Surgeon: Robe Meyers MD;  [facility];  Service: Urology   • URETERAL STENT PLACEMENT Right 2018    Procedure: INSERTION STENT URETERAL;  Surgeon: Robe Meyers MD;  [facility];  Service: Urology       Social History     Socioeconomic History   • Marital status: /Civil Union     Spouse name: Not on file   • Number of children: Not on file   • Years of education: Not on file   • Highest education level: Not on file   Occupational History   • Occupation:    Tobacco Use   • Smoking status: Former     Packs/day: 0.20     Years: 12.00     Total pack years: 2.40     Types: Cigarettes     Quit date:      Years since quittin.5   • Smokeless tobacco: Never   • Tobacco comments:     quit 13 years ago   Vaping Use   • Vaping Use: Never used   Substance and Sexual Activity   • Alcohol use: Yes     Alcohol/week: 2.0 standard drinks of alcohol     Types: 2 Glasses of wine per week   • Drug use: No   • Sexual activity: Yes     Partners: Male   Other Topics Concern   • Not on file   Social History Narrative   • Not on file     Social Determinants of Health     Financial Resource Strain: Not on file   Food Insecurity: Not on file   Transportation Needs: Not on file   Physical Activity: Not on file   Stress: Not on file   Social Connections: Not on file   Intimate Partner Violence: Not on file   Housing Stability: Not on file       Family History   Problem Relation Age of Onset   • Heart attack Mother    • Hypertension Mother    • Colon cancer Brother    • Diabetes type II Paternal Grandfather        Allergies   Allergen Reactions   • Shellfish-Derived Products - Food Allergy Anaphylaxis     THROAT ITCHY MIGRAINES   • Fentanyl Itching       Current Outpatient Medications:   •  FLUoxetine (PROzac) 10 mg capsule, Take 1 capsule (10 mg total) by mouth daily, Disp: 30 capsule, Rfl: 1      Objective:    Vitals:    08/02/23 1528   BP: 98/82   Pulse: 70       Physical Exam  General: Well appearing, well nourished, in no distress. Oriented x 3, normal mood and affect. Ambulating without difficulty. Skin: Good turgor, no unusual bruising or prominent lesions. Hair: Normal texture and distribution. Nails: Normal color, no deformities. No pitting. HEENT:  Head: Normocephalic, atraumatic. Eyes: Conjunctiva clear, sclera non-icteric, EOM intact. Extremities: No amputations or deformities, cyanosis, edema. Neurologic: Alert and oriented. No focal neurological deficits appreciated. Psychiatric: Normal mood and affect. Shanna Dorman M.D.   Rheumatology

## 2023-08-02 ENCOUNTER — OFFICE VISIT (OUTPATIENT)
Dept: RHEUMATOLOGY | Facility: CLINIC | Age: 43
End: 2023-08-02
Payer: COMMERCIAL

## 2023-08-02 VITALS — SYSTOLIC BLOOD PRESSURE: 98 MMHG | DIASTOLIC BLOOD PRESSURE: 82 MMHG | HEART RATE: 70 BPM

## 2023-08-02 DIAGNOSIS — M25.50 POLYARTHRALGIA: Primary | ICD-10-CM

## 2023-08-02 PROCEDURE — 99213 OFFICE O/P EST LOW 20 MIN: CPT | Performed by: INTERNAL MEDICINE

## 2023-08-03 DIAGNOSIS — F41.1 GAD (GENERALIZED ANXIETY DISORDER): ICD-10-CM

## 2023-08-03 DIAGNOSIS — F41.8 SITUATIONAL ANXIETY: ICD-10-CM

## 2023-08-03 RX ORDER — FLUOXETINE 10 MG/1
10 CAPSULE ORAL DAILY
Qty: 90 CAPSULE | Refills: 1 | Status: SHIPPED | OUTPATIENT
Start: 2023-08-03

## 2023-09-11 ENCOUNTER — OFFICE VISIT (OUTPATIENT)
Dept: FAMILY MEDICINE CLINIC | Facility: CLINIC | Age: 43
End: 2023-09-11
Payer: COMMERCIAL

## 2023-09-11 VITALS
WEIGHT: 124 LBS | SYSTOLIC BLOOD PRESSURE: 96 MMHG | HEART RATE: 64 BPM | OXYGEN SATURATION: 96 % | BODY MASS INDEX: 19.46 KG/M2 | DIASTOLIC BLOOD PRESSURE: 64 MMHG | HEIGHT: 67 IN | TEMPERATURE: 97.2 F | RESPIRATION RATE: 16 BRPM

## 2023-09-11 DIAGNOSIS — M67.90 TENDINOPATHY: ICD-10-CM

## 2023-09-11 DIAGNOSIS — M79.671 RIGHT FOOT PAIN: Primary | ICD-10-CM

## 2023-09-11 DIAGNOSIS — Z12.31 ENCOUNTER FOR SCREENING MAMMOGRAM FOR BREAST CANCER: ICD-10-CM

## 2023-09-11 DIAGNOSIS — F41.1 GAD (GENERALIZED ANXIETY DISORDER): ICD-10-CM

## 2023-09-11 PROCEDURE — 99214 OFFICE O/P EST MOD 30 MIN: CPT | Performed by: FAMILY MEDICINE

## 2023-09-11 NOTE — PROGRESS NOTES
Name: Singh Mandujano      : 1980      MRN: [id number]  Encounter Provider: Shoaib Luu MD  Encounter Date: 2023   Encounter department: [facility]   Pain in the anterior right foot and tibia. Possible anterior ankle impingement syndrome vs tendinopathy vs overuse of the tibialis anterior muscle. Pain is improving. Tenderness note along the distal aspect of the tibia and when the foot is dorsiflexed with both active and passive movement. Recommend ice, rest, and NSAIDS. Anxiety improved with Prozac. Her and her partner are on better terms as well. Weight has gone up and appetite is improving. Lastly, pt reminded to do BW        1. Right foot pain    2. Encounter for screening mammogram for breast cancer  -     Mammo screening bilateral w cad; Future; Expected date: 2023    3. MARLIN (generalized anxiety disorder)    4. Tendinopathy         Subjective      Seen today with right foot pain. Onset of the symptoms was 1 week ago. Precipitating event: none known. Current symptoms include: pain with dorsiflexion or when pressure is applied to the shin. Aggravating factors: walking. Symptoms have gradually improved. Patient has had prior foot  problems. Evaluation to date: none for this particular issue. Treatment to date: NSAIDs and rest.    Review of Systems   Constitutional: Positive for unexpected weight change (regaining weight ). Musculoskeletal: Positive for arthralgias (right ankle pain ). Psychiatric/Behavioral: The patient is nervous/anxious (improved).         Current Outpatient Medications on File Prior to Visit   Medication Sig   • FLUoxetine (PROzac) 10 mg capsule TAKE 1 CAPSULE BY MOUTH EVERY DAY       Objective     BP 96/64 (BP Location: Left arm, Patient Position: Sitting, Cuff Size: Standard)   Pulse 64   Temp (!) 97.2 °F (36.2 °C) (Tympanic)   Resp 16   Ht 5' 6.5" (1.689 m)   Wt 56.2 kg (124 lb)   SpO2 96%   BMI 19.71 kg/m² Physical Exam  Vitals reviewed. Constitutional:       General: She is not in acute distress. Appearance: Normal appearance. She is not ill-appearing or toxic-appearing. HENT:      Head: Normocephalic and atraumatic. Pulmonary:      Effort: Pulmonary effort is normal.   Musculoskeletal:      Right ankle: No swelling, deformity, ecchymosis or lacerations. Tenderness (anterior ankle and tibia ) present. Decreased range of motion (d/t pain ). Right foot: Tenderness and bony tenderness present. No swelling or deformity. Legs:    Neurological:      Mental Status: She is alert.    Psychiatric:         Behavior: Behavior normal.       Zaid Soler MD

## 2023-09-26 ENCOUNTER — OFFICE VISIT (OUTPATIENT)
Dept: FAMILY MEDICINE CLINIC | Facility: CLINIC | Age: 43
End: 2023-09-26
Payer: COMMERCIAL

## 2023-09-26 VITALS
DIASTOLIC BLOOD PRESSURE: 84 MMHG | HEART RATE: 67 BPM | SYSTOLIC BLOOD PRESSURE: 120 MMHG | HEIGHT: 67 IN | OXYGEN SATURATION: 97 % | RESPIRATION RATE: 14 BRPM | TEMPERATURE: 96.6 F | WEIGHT: 124.8 LBS | BODY MASS INDEX: 19.59 KG/M2

## 2023-09-26 DIAGNOSIS — M79.671 RIGHT FOOT PAIN: ICD-10-CM

## 2023-09-26 DIAGNOSIS — Z00.00 ENCOUNTER FOR ANNUAL PHYSICAL EXAM: Primary | ICD-10-CM

## 2023-09-26 DIAGNOSIS — F41.1 GAD (GENERALIZED ANXIETY DISORDER): ICD-10-CM

## 2023-09-26 PROCEDURE — 99396 PREV VISIT EST AGE 40-64: CPT | Performed by: FAMILY MEDICINE

## 2023-09-26 NOTE — ASSESSMENT & PLAN NOTE
Controlled with prozac 10 mg daily and in remission  Will discuss tapering schedule at the next appt in the spring/ summer  For now, continue current dose

## 2023-09-26 NOTE — PROGRESS NOTES
[facility]    NAME: Yossi Lewis  AGE: 37 y.o. SEX: female  : 1980     DATE: 2023     Assessment and Plan:     Problem List Items Addressed This Visit        Other    MARLIN (generalized anxiety disorder)     Controlled with prozac 10 mg daily and in remission  Will discuss tapering schedule at the next appt in the spring/ summer  For now, continue current dose         Other Visit Diagnoses     Encounter for annual physical exam    -  Primary    Healthy female exam. Cervical cancer screen no longer indicated s/p hysterectomy. Reminded to scheduled mammogram.     Right foot pain        resolved          Immunizations and preventive care screenings were discussed with patient today. Appropriate education was printed on patient's after visit summary. Counseling:  Alcohol/drug use: discussed moderation in alcohol intake, the recommendations for healthy alcohol use, and avoidance of illicit drug use. Dental Health: discussed importance of regular tooth brushing, flossing, and dental visits. Exercise: the importance of regular exercise/physical activity was discussed. Recommend exercise 3-5 times per week for at least 30 minutes. No follow-ups on file. Chief Complaint:     Chief Complaint   Patient presents with   • Physical Exam     Patient being seen for physical       History of Present Illness:     Adult Annual Physical   Patient here for a comprehensive physical exam. The patient reports no problems. Mood is better on prozac. Also on better terms with . Appetite improving. Applied to a new role at [employer]. Doing well overall. Does plans to schedule mammogram at some point. Diet and Physical Activity  Diet/Nutrition: Doesn't follow a particular diet . Exercise: 3 x a week. Depression Screening  PHQ-2/9 Depression Screening         General Health  Sleep: sleeps well.    Hearing: normal - bilateral.  Vision: lost his her glassess but ordered new ones. Dental: 3 months ago. /GYN Health  Patient is: hysterectomy  Last menstrual period: s/p hysterectomy  Contraceptive method: N/A      Review of Systems:     Review of Systems   Past Medical History:     Past Medical History:   Diagnosis Date   • COVID-19    • Diverticulosis    • Hydronephrosis    • Migraine    • PONV (postoperative nausea and vomiting)    • TMJ arthralgia     Resolved 10/29/2015    • Ureteral stricture       Past Surgical History:     Past Surgical History:   Procedure Laterality Date   • APPENDECTOMY     • CYSTOSCOPY W/ LASER LITHOTRIPSY Right 11/2/2017    Procedure: CYSTOSCOPY;  RIGHT URETEROSCOPY WITH  HOLMIUM LASER INCISION OF URETERAL STRICTURE; (WITH HYDROSTATIC RIGHT URETERAL DILATION), BILATERAL RETROGRADE PYELOGRAM AND INSERTION OF RIGHT URETERAL STENT X 2 (4.7 X 26);   Surgeon: Devon Redding MD;  [facility];  Service: Urology   • DILATION AND CURETTAGE OF UTERUS N/A 12/30/2016    Procedure: DILATATION AND CURETTAGE;  Surgeon: Scooby Khanna MD;  [facility];  Service:    • DILATION AND EVACUATION     • HYSTERECTOMY N/A 5/22/2017    Procedure: EXAM UNDER ANESTHESIA;  Surgeon: Scooby Khanna MD;  [facility];  Service:    • HYSTEROSCOPY N/A 12/30/2016    Procedure: HYSTEROSCOPY;  Surgeon: Scooby Khanna MD;  [facility];  Service:    • MN CYSTO BLADDER W/URETERAL CATHETERIZATION Bilateral 3/27/2017    Procedure: CYSTOSCOPY WITH RETROGRADE PYELOGRAM; RIGHT STENT INSERTION;  Surgeon: Devon Redding MD;  [facility];  Service: Urology   • MN CYSTO BLADDER W/URETERAL CATHETERIZATION N/A 1/11/2018    Procedure: CYSTOSCOPY , BILATERAL RETROGRADE PYELOGRAM WITH RIGHT STENT EXTRACTION;  Surgeon: Devon Redding MD;  [facility];  Service: Urology   • MN CYSTO BLADDER W/URETERAL CATHETERIZATION Right 1/15/2018    Procedure: CYSTOSCOPY, RIGHT RETROGRADE PYELOGRAM WITH INSERTION OF RIGHT STENT URETERAL;  Surgeon: Devon Redding MD;  [facility];  Service: Urology   • MN LAPAROSCOPY W TOTAL HYSTERECTOMY UTERUS 250 GM/< N/A 3/24/2017    Procedure: TOTAL LAPAROSCOPIC HYSTERECTOMY . bilateral salpingectomy, cysto; Surgeon: Scooby Khanna MD;  [facility];  Service: Gynecology   • REIMPLANT URETER IN BLADDER Right 2018    Procedure: URETERAL NEOCYSTOSTOMY, EXCISION OF URETERAL STRICTURE.;  Surgeon: Devon Redding MD;  [facility];  Service: Urology   • URETERAL STENT PLACEMENT Right 2018    Procedure: INSERTION STENT URETERAL;  Surgeon: Devon Redding MD;  [facility];  Service: Urology      Social History:     Social History     Socioeconomic History   • Marital status: /Civil Union     Spouse name: None   • Number of children: None   • Years of education: None   • Highest education level: None   Occupational History   • Occupation:    Tobacco Use   • Smoking status: Former     Packs/day: 0.20     Years: 12.00     Total pack years: 2.40     Types: Cigarettes     Quit date:      Years since quittin.7   • Smokeless tobacco: Never   • Tobacco comments:     quit 13 years ago   Vaping Use   • Vaping Use: Never used   Substance and Sexual Activity   • Alcohol use:  Yes     Alcohol/week: 2.0 standard drinks of alcohol     Types: 2 Glasses of wine per week   • Drug use: No   • Sexual activity: Yes     Partners: Male   Other Topics Concern   • None   Social History Narrative   • None     Social Determinants of Health     Financial Resource Strain: Not on file   Food Insecurity: Not on file   Transportation Needs: Not on file   Physical Activity: Not on file   Stress: Not on file   Social Connections: Not on file   Intimate Partner Violence: Not on file   Housing Stability: Not on file      Family History:     Family History   Problem Relation Age of Onset   • Heart attack Mother    • Hypertension Mother    • Colon cancer Brother    • Diabetes type II Paternal Grandfather       Current Medications:     Current Outpatient Medications   Medication Sig Dispense Refill   • FLUoxetine (PROzac) 10 mg capsule TAKE 1 CAPSULE BY MOUTH EVERY DAY 90 capsule 1     No current facility-administered medications for this visit. Allergies: Allergies   Allergen Reactions   • Shellfish-Derived Products - Food Allergy Anaphylaxis     THROAT ITCHY MIGRAINES   • Fentanyl Itching      Physical Exam:     /84 (BP Location: Left arm, Patient Position: Sitting, Cuff Size: Standard)   Pulse 67   Temp (!) 96.6 °F (35.9 °C) (Tympanic)   Resp 14   Ht 5' 6.5" (1.689 m)   Wt 56.6 kg (124 lb 12.8 oz)   SpO2 97%   BMI 19.84 kg/m²     Physical Exam  Vitals reviewed. Constitutional:       General: She is not in acute distress. Appearance: Normal appearance. She is not ill-appearing or toxic-appearing. HENT:      Head: Normocephalic. Right Ear: Tympanic membrane normal.      Left Ear: Tympanic membrane normal.      Mouth/Throat:      Mouth: Mucous membranes are moist.   Eyes:      Extraocular Movements: Extraocular movements intact. Cardiovascular:      Rate and Rhythm: Normal rate and regular rhythm. Heart sounds: No murmur heard. Pulmonary:      Effort: Pulmonary effort is normal.      Breath sounds: Normal breath sounds. Musculoskeletal:      Right lower leg: No edema. Left lower leg: No edema. Lymphadenopathy:      Cervical: No cervical adenopathy. Skin:     General: Skin is warm. Neurological:      Mental Status: She is alert and oriented to person, place, and time. Psychiatric:         Mood and Affect: Mood normal.         Behavior: Behavior normal.         Thought Content:  Thought content normal.          Gill Garcia MD  [address] [place] [address]

## 2023-11-21 ENCOUNTER — OFFICE VISIT (OUTPATIENT)
Dept: FAMILY MEDICINE CLINIC | Facility: CLINIC | Age: 43
End: 2023-11-21
Payer: COMMERCIAL

## 2023-11-21 ENCOUNTER — HOSPITAL ENCOUNTER (OUTPATIENT)
Dept: MAMMOGRAPHY | Facility: HOSPITAL | Age: 43
Discharge: HOME | End: 2023-11-21
Attending: FAMILY MEDICINE
Payer: COMMERCIAL

## 2023-11-21 VITALS — BODY MASS INDEX: 19.78 KG/M2 | HEIGHT: 67 IN | WEIGHT: 126 LBS

## 2023-11-21 VITALS
HEIGHT: 67 IN | HEART RATE: 68 BPM | WEIGHT: 126 LBS | OXYGEN SATURATION: 98 % | RESPIRATION RATE: 16 BRPM | SYSTOLIC BLOOD PRESSURE: 114 MMHG | TEMPERATURE: 96.1 F | BODY MASS INDEX: 19.78 KG/M2 | DIASTOLIC BLOOD PRESSURE: 76 MMHG

## 2023-11-21 DIAGNOSIS — R82.90 FOUL SMELLING URINE: ICD-10-CM

## 2023-11-21 DIAGNOSIS — N30.00 ACUTE CYSTITIS WITHOUT HEMATURIA: Primary | ICD-10-CM

## 2023-11-21 DIAGNOSIS — Z12.31 ENCOUNTER FOR SCREENING MAMMOGRAM FOR BREAST CANCER: ICD-10-CM

## 2023-11-21 LAB
SL AMB  POCT GLUCOSE, UA: ABNORMAL
SL AMB LEUKOCYTE ESTERASE,UA: ABNORMAL
SL AMB POCT BILIRUBIN,UA: ABNORMAL
SL AMB POCT BLOOD,UA: ABNORMAL
SL AMB POCT CLARITY,UA: ABNORMAL
SL AMB POCT COLOR,UA: YELLOW
SL AMB POCT KETONES,UA: ABNORMAL
SL AMB POCT NITRITE,UA: ABNORMAL
SL AMB POCT PH,UA: 8
SL AMB POCT SPECIFIC GRAVITY,UA: 1.01
SL AMB POCT URINE PROTEIN: 1
SL AMB POCT UROBILINOGEN: ABNORMAL

## 2023-11-21 PROCEDURE — 87086 URINE CULTURE/COLONY COUNT: CPT | Performed by: FAMILY MEDICINE

## 2023-11-21 PROCEDURE — 77063 BREAST TOMOSYNTHESIS BI: CPT

## 2023-11-21 PROCEDURE — 77067 SCR MAMMO BI INCL CAD: CPT

## 2023-11-21 PROCEDURE — 81002 URINALYSIS NONAUTO W/O SCOPE: CPT | Performed by: FAMILY MEDICINE

## 2023-11-21 PROCEDURE — 99213 OFFICE O/P EST LOW 20 MIN: CPT | Performed by: FAMILY MEDICINE

## 2023-11-21 RX ORDER — CIPROFLOXACIN 500 MG/1
500 TABLET, FILM COATED ORAL EVERY 24 HOURS
Qty: 3 TABLET | Refills: 0 | Status: SHIPPED | OUTPATIENT
Start: 2023-11-21 | End: 2023-11-24

## 2023-11-21 NOTE — PROGRESS NOTES
Name: Yossi Lewis      : 1980      MRN: [id number]  Encounter Provider: Yara Puga DO  Encounter Date: 2023   Encounter department: [facility]     1. Acute cystitis without hematuria  Assessment & Plan:  - 3-day history of dysuria. Denies any blood in her urine. No concern for STI. -Does have history of Pseudomonas UTI 2018.  -Urine dip positive for leukocytes, negative nitrates, negative blood.  -We will start ciprofloxacin 500 mg daily for 3 days based off susceptibilities of previous culture. -Urine culture pending we will contact with susceptibilities as needed. -Recommend adequate hydration, avoid bladder irritants.  -Follow-up as needed. Orders:  -     ciprofloxacin (CIPRO) 500 mg tablet; Take 1 tablet (500 mg total) by mouth every 24 hours for 3 days  -     Urine culture    2. Foul smelling urine  -     POCT urine dip           Subjective     Jocelyn Hunter is a 33-year-old female with past medical history of diverticulosis, kidney stones,, ureteral reimplantation who presents today with 3-day history of painful urination. She denies any flank pain. Denies any blood in her urine. She is sexually active with 1 partner with no concerns for STI. She admits that she might be dehydrated but has been increasing her water intake and notes feels like UTI at this point. She does have history of Pseudomonas UTI in the past.  Denies any catheterizations. No fevers, chills, nausea, vomiting, diarrhea. No other concerns today. Review of Systems   Constitutional:  Negative for chills, fatigue and fever. HENT:  Negative for ear pain and sore throat. Eyes:  Negative for pain and visual disturbance. Respiratory:  Negative for cough and shortness of breath. Cardiovascular:  Negative for chest pain and palpitations. Gastrointestinal:  Negative for abdominal pain and vomiting. Endocrine: Negative for polydipsia.    Genitourinary:  Positive for dysuria. Negative for difficulty urinating, hematuria and vaginal discharge. Musculoskeletal:  Negative for arthralgias, back pain and neck pain. Skin:  Negative for color change and rash. Neurological:  Negative for dizziness, seizures, syncope and headaches. Psychiatric/Behavioral:  Negative for confusion and sleep disturbance. The patient is not nervous/anxious. All other systems reviewed and are negative. Past Medical History:   Diagnosis Date    COVID-19     Diverticulosis     Hydronephrosis     Migraine     PONV (postoperative nausea and vomiting)     TMJ arthralgia     Resolved 10/29/2015     Ureteral stricture      Past Surgical History:   Procedure Laterality Date    APPENDECTOMY      CYSTOSCOPY W/ LASER LITHOTRIPSY Right 11/2/2017    Procedure: CYSTOSCOPY;  RIGHT URETEROSCOPY WITH  HOLMIUM LASER INCISION OF URETERAL STRICTURE; (WITH HYDROSTATIC RIGHT URETERAL DILATION), BILATERAL RETROGRADE PYELOGRAM AND INSERTION OF RIGHT URETERAL STENT X 2 (4.7 X 26);   Surgeon: Jarrod Grimaldo MD;  Location: [facility];  Service: Urology    DILATION AND CURETTAGE OF UTERUS N/A 12/30/2016    Procedure: DILATATION AND CURETTAGE;  Surgeon: Juan Harvey MD;  Location: [facility];  Service:     DILATION AND EVACUATION      HYSTERECTOMY N/A 5/22/2017    Procedure: EXAM UNDER ANESTHESIA;  Surgeon: Juan Harvey MD;  Location[facility];  Service:     HYSTEROSCOPY N/A 12/30/2016    Procedure: HYSTEROSCOPY;  Surgeon: Juan Harvey MD;  Location: [facility];  Service:     IA CYSTO BLADDER W/URETERAL CATHETERIZATION Bilateral 3/27/2017    Procedure: CYSTOSCOPY WITH RETROGRADE PYELOGRAM; RIGHT STENT INSERTION;  Surgeon: Jarrod Grimaldo MD;  [facility];  Service: Urology    IA CYSTO BLADDER W/URETERAL CATHETERIZATION N/A 1/11/2018    Procedure: CYSTOSCOPY , BILATERAL RETROGRADE PYELOGRAM WITH RIGHT STENT EXTRACTION;  Surgeon: Jarrod Grimaldo MD;  [facility];  Service: Urology    IA CYSTO BLADDER W/URETERAL CATHETERIZATION Right 1/15/2018    Procedure: CYSTOSCOPY, RIGHT RETROGRADE PYELOGRAM WITH INSERTION OF RIGHT STENT URETERAL;  Surgeon: Bernadine Mckeon MD;  [facility];  Service: Urology    NM LAPAROSCOPY W TOTAL HYSTERECTOMY UTERUS 250 GM/< N/A 3/24/2017    Procedure: TOTAL LAPAROSCOPIC HYSTERECTOMY . bilateral salpingectomy, cysto; Surgeon: Rommel Peguero MD;  Location: [facility];  Service: Gynecology    REIMPLANT URETER IN BLADDER Right 2018    Procedure: URETERAL NEOCYSTOSTOMY, EXCISION OF URETERAL STRICTURE.;  Surgeon: Bernadine Mckeon MD;  [facility];  Service: Urology    URETERAL STENT PLACEMENT Right 2018    Procedure: INSERTION STENT URETERAL;  Surgeon: Bernadine Mckeon MD;  [facility];  Service: Urology     Family History   Problem Relation Age of Onset    Heart attack Mother     Hypertension Mother     Colon cancer Brother     Diabetes type II Paternal Grandfather      Social History     Socioeconomic History    Marital status: /Civil Union     Spouse name: None    Number of children: None    Years of education: None    Highest education level: None   Occupational History    Occupation:    Tobacco Use    Smoking status: Former     Packs/day: 0.20     Years: 12.00     Total pack years: 2.40     Types: Cigarettes     Quit date:      Years since quittin.8    Smokeless tobacco: Never    Tobacco comments:     quit 13 years ago   Vaping Use    Vaping Use: Never used   Substance and Sexual Activity    Alcohol use:  Yes     Alcohol/week: 2.0 standard drinks of alcohol     Types: 2 Glasses of wine per week    Drug use: No    Sexual activity: Yes     Partners: Male   Other Topics Concern    None   Social History Narrative    None     Social Determinants of Health     Financial Resource Strain: Not on file   Food Insecurity: Not on file   Transportation Needs: Not on file   Physical Activity: Not on file   Stress: Not on file Social Connections: Not on file   Intimate Partner Violence: Not on file   Housing Stability: Not on file     Current Outpatient Medications on File Prior to Visit   Medication Sig    FLUoxetine (PROzac) 10 mg capsule TAKE 1 CAPSULE BY MOUTH EVERY DAY     Allergies   Allergen Reactions    Shellfish-Derived Products - Food Allergy Anaphylaxis     THROAT ITCHY MIGRAINES    Fentanyl Itching    Chandni-Fennel - Food Allergy Hives     Immunization History   Administered Date(s) Administered    COVID-19 PFIZER VACCINE 0.3 ML IM 12/22/2020, 01/11/2021, 11/27/2021    INFLUENZA 10/01/2019, 10/21/2020    Tdap 07/01/2011       Objective     /76 (BP Location: Left arm, Patient Position: Sitting, Cuff Size: Standard)   Pulse 68   Temp (!) 96.1 °F (35.6 °C) (Tympanic)   Resp 16   Ht 5' 6.5" (1.689 m)   Wt 57.2 kg (126 lb)   SpO2 98%   BMI 20.03 kg/m²     Physical Exam  Vitals and nursing note reviewed. Constitutional:       Appearance: Normal appearance. HENT:      Head: Normocephalic and atraumatic. Right Ear: Tympanic membrane and external ear normal.      Left Ear: Tympanic membrane and external ear normal.      Nose: Nose normal.      Mouth/Throat:      Mouth: Mucous membranes are moist.   Eyes:      Extraocular Movements: Extraocular movements intact. Conjunctiva/sclera: Conjunctivae normal.      Pupils: Pupils are equal, round, and reactive to light. Cardiovascular:      Rate and Rhythm: Normal rate and regular rhythm. Pulses: Normal pulses. Heart sounds: Normal heart sounds. Pulmonary:      Effort: Pulmonary effort is normal.      Breath sounds: Normal breath sounds. Abdominal:      General: Bowel sounds are normal.      Palpations: Abdomen is soft. Tenderness: There is no abdominal tenderness. Musculoskeletal:         General: Normal range of motion. Cervical back: Normal range of motion. Lymphadenopathy:      Cervical: No cervical adenopathy.    Skin:     General: Skin is warm. Capillary Refill: Capillary refill takes less than 2 seconds. Neurological:      General: No focal deficit present. Mental Status: She is alert and oriented to person, place, and time.    Psychiatric:         Mood and Affect: Mood normal.         Behavior: Behavior normal.       Solis Lui,

## 2023-11-21 NOTE — ASSESSMENT & PLAN NOTE
- 3-day history of dysuria. Denies any blood in her urine. No concern for STI. -Does have history of Pseudomonas UTI 2018.  -Urine dip positive for leukocytes, negative nitrates, negative blood.  -We will start ciprofloxacin 500 mg daily for 3 days based off susceptibilities of previous culture. -Urine culture pending we will contact with susceptibilities as needed. -Recommend adequate hydration, avoid bladder irritants.  -Follow-up as needed.

## 2023-11-23 ENCOUNTER — APPOINTMENT (EMERGENCY)
Dept: RADIOLOGY | Facility: HOSPITAL | Age: 43
End: 2023-11-23
Payer: COMMERCIAL

## 2023-11-23 ENCOUNTER — HOSPITAL ENCOUNTER (EMERGENCY)
Facility: HOSPITAL | Age: 43
Discharge: HOME | End: 2023-11-23
Attending: EMERGENCY MEDICINE
Payer: COMMERCIAL

## 2023-11-23 VITALS
RESPIRATION RATE: 18 BRPM | OXYGEN SATURATION: 93 % | DIASTOLIC BLOOD PRESSURE: 56 MMHG | HEART RATE: 95 BPM | TEMPERATURE: 97.7 F | SYSTOLIC BLOOD PRESSURE: 111 MMHG

## 2023-11-23 DIAGNOSIS — J38.5 SPASM OF VOCAL CORDS: ICD-10-CM

## 2023-11-23 DIAGNOSIS — T78.40XA ALLERGIC REACTION, INITIAL ENCOUNTER: Primary | ICD-10-CM

## 2023-11-23 LAB
ATRIAL RATE: 91 BPM
BACTERIA UR CULT: NORMAL
P AXIS: 48 DEGREES
PR INTERVAL: 132 MS
QRS AXIS: 76 DEGREES
QRSD INTERVAL: 86 MS
QT INTERVAL: 364 MS
QTC INTERVAL: 442 MS
T WAVE AXIS: 70 DEGREES
VENTRICULAR RATE: 89 BPM

## 2023-11-23 PROCEDURE — 93005 ELECTROCARDIOGRAM TRACING: CPT

## 2023-11-23 PROCEDURE — 96361 HYDRATE IV INFUSION ADD-ON: CPT

## 2023-11-23 PROCEDURE — 99283 EMERGENCY DEPT VISIT LOW MDM: CPT

## 2023-11-23 PROCEDURE — 93010 ELECTROCARDIOGRAM REPORT: CPT

## 2023-11-23 PROCEDURE — 96375 TX/PRO/DX INJ NEW DRUG ADDON: CPT

## 2023-11-23 PROCEDURE — 71045 X-RAY EXAM CHEST 1 VIEW: CPT

## 2023-11-23 PROCEDURE — 96372 THER/PROPH/DIAG INJ SC/IM: CPT

## 2023-11-23 PROCEDURE — 99291 CRITICAL CARE FIRST HOUR: CPT | Performed by: EMERGENCY MEDICINE

## 2023-11-23 PROCEDURE — 96374 THER/PROPH/DIAG INJ IV PUSH: CPT

## 2023-11-23 RX ORDER — LORAZEPAM 2 MG/ML
0.5 INJECTION INTRAMUSCULAR ONCE
Status: COMPLETED | OUTPATIENT
Start: 2023-11-23 | End: 2023-11-23

## 2023-11-23 RX ORDER — METHYLPREDNISOLONE SODIUM SUCCINATE 125 MG/2ML
125 INJECTION, POWDER, LYOPHILIZED, FOR SOLUTION INTRAMUSCULAR; INTRAVENOUS ONCE
Status: COMPLETED | OUTPATIENT
Start: 2023-11-23 | End: 2023-11-23

## 2023-11-23 RX ORDER — EPINEPHRINE 1 MG/ML
0.3 INJECTION, SOLUTION, CONCENTRATE INTRAVENOUS ONCE
Status: COMPLETED | OUTPATIENT
Start: 2023-11-23 | End: 2023-11-23

## 2023-11-23 RX ORDER — EPINEPHRINE 0.3 MG/.3ML
0.1 INJECTION SUBCUTANEOUS ONCE
Qty: 0.2 ML | Refills: 0 | Status: SHIPPED | OUTPATIENT
Start: 2023-11-23 | End: 2023-11-30

## 2023-11-23 RX ORDER — FAMOTIDINE 10 MG/ML
20 INJECTION, SOLUTION INTRAVENOUS ONCE
Status: COMPLETED | OUTPATIENT
Start: 2023-11-23 | End: 2023-11-23

## 2023-11-23 RX ADMIN — RACEPINEPHRINE HYDROCHLORIDE 0.5 ML: 11.25 SOLUTION RESPIRATORY (INHALATION) at 20:09

## 2023-11-23 RX ADMIN — SODIUM CHLORIDE 1000 ML: 0.9 INJECTION, SOLUTION INTRAVENOUS at 19:49

## 2023-11-23 RX ADMIN — METHYLPREDNISOLONE SODIUM SUCCINATE 125 MG: 125 INJECTION, POWDER, FOR SOLUTION INTRAMUSCULAR; INTRAVENOUS at 19:41

## 2023-11-23 RX ADMIN — EPINEPHRINE 0.3 MG: 1 INJECTION, SOLUTION, CONCENTRATE INTRAVENOUS at 19:34

## 2023-11-23 RX ADMIN — LORAZEPAM 0.5 MG: 2 INJECTION INTRAMUSCULAR; INTRAVENOUS at 20:09

## 2023-11-23 RX ADMIN — LORAZEPAM 0.5 MG: 2 INJECTION INTRAMUSCULAR; INTRAVENOUS at 19:49

## 2023-11-23 RX ADMIN — FAMOTIDINE 20 MG: 10 INJECTION INTRAVENOUS at 19:41

## 2023-11-24 NOTE — ED ATTENDING ATTESTATION
I supervised the Advanced Practitioner. ? I performed, in its entirety, the exam and assessment/plan component of the visit. I agree with the Advanced Practitioner's note with the following additions/exceptions:          A 49-year-old female who presents with concern for allergic reaction that began 1 hour prior to arrival.  Patient describes her symptoms as itching of the anterior neck and shortness of breath. She did take Benadryl without relief. She has several known allergies, however denies any exposures today. On exam, patient appears uncomfortable and scratching at her neck. No urticaria noted. No lip, tongue or facial swelling appreciated. Stridor and forced exhalations appreciated. No wheezing, rhonchi or rales. Suspect symptoms are likely to laryngeal spasm, question psychogenic component. Doubt acute allergic reactions, however pt has already received IM epi with IV solumedrol and pepcid. Will give ativan now and continue to monitor pt's symptoms. Vicky Lucas DO 11/23/23        ED Course         Critical Care Time  CriticalCare Time    Date/Time: 11/23/2023 9:12 PM    Performed by: iVcky Lucas DO  Authorized by: [address], [place]    Critical care provider statement:     Critical care time (minutes):  30    Critical care time was exclusive of:  Separately billable procedures and treating other patients and teaching time    Critical care was necessary to treat or prevent imminent or life-threatening deterioration of the following conditions: larygneal spasm.     Critical care was time spent personally by me on the following activities:  Obtaining history from patient or surrogate, development of treatment plan with patient or surrogate, examination of patient, evaluation of patient's response to treatment, re-evaluation of patient's condition, ordering and review of radiographic studies, ordering and performing treatments and interventions and review of old charts (IM and racemic epi)    I assumed direction of critical care for this patient from another provider in my specialty: no

## 2023-11-24 NOTE — DISCHARGE INSTRUCTIONS
Gave results, Mailed copy      Please return to the emergency department for any concerns as outlined in the after visit summary or for any other concerns. Please follow-up with your primary care provider and the ear, nose and throat doctor at the contact number provided in 2 days for re-evaluation and further management. Avoid known food allergies. Use EpiPen as prescribed as needed for severe allergic reaction.

## 2023-11-24 NOTE — ED PROVIDER NOTES
History  Chief Complaint   Patient presents with    Allergic Reaction     Pt reports allergic reaction unsure patient unsure what she ate. Pt reports taking 2 benadryl     18-year-old female with a reported past medical history significant for allergies to shellfish and fentanyl and possibly romy. Patient reports about 1 hour ago she began with severe itching of her neck and trouble breathing. States she has had similar issues before but is usually resolved with Benadryl. States she took 50 mg of Benadryl without any significant relief, prompting her visit to the ED. Reports she did eat Thanksgiving dinner but uncertain if she was exposed to any of her allergies. Reports prior to this incident she is otherwise been feeling well. No new medications or known new exposures. No personal or family history of angioedema. No complaints of lip swelling, tongue swelling or throat swelling. No chest pain or chest tightness. No nausea, vomiting, abdominal pain or diarrhea. No lightheadedness or feelings that she is going to pass out. States she did drink some wine tonight but no other alcohol or illicit drug use. No concern for foreign body ingestion. Has never been admitted or intubated for allergic reaction previously. Prior to Admission Medications   Prescriptions Last Dose Informant Patient Reported? Taking?    FLUoxetine (PROzac) 10 mg capsule   No No   Sig: TAKE 1 CAPSULE BY MOUTH EVERY DAY   ciprofloxacin (CIPRO) 500 mg tablet   No No   Sig: Take 1 tablet (500 mg total) by mouth every 24 hours for 3 days      Facility-Administered Medications: None       Past Medical History:   Diagnosis Date    COVID-19     Diverticulosis     Hydronephrosis     Migraine     PONV (postoperative nausea and vomiting)     TMJ arthralgia     Resolved 10/29/2015     Ureteral stricture        Past Surgical History:   Procedure Laterality Date    APPENDECTOMY      CYSTOSCOPY W/ LASER LITHOTRIPSY Right 11/2/2017 Procedure: CYSTOSCOPY;  RIGHT URETEROSCOPY WITH  HOLMIUM LASER INCISION OF URETERAL STRICTURE; (WITH HYDROSTATIC RIGHT URETERAL DILATION), BILATERAL RETROGRADE PYELOGRAM AND INSERTION OF RIGHT URETERAL STENT X 2 (4.7 X 26); Surgeon: Eduardo Du MD;  [facility];  Service: Urology    DILATION AND CURETTAGE OF UTERUS N/A 12/30/2016    Procedure: DILATATION AND CURETTAGE;  Surgeon: Kay Mills MD;  [facility];  Service:     DILATION AND EVACUATION      HYSTERECTOMY N/A 5/22/2017    Procedure: EXAM UNDER ANESTHESIA;  Surgeon: Kay Mills MD;  [facility];  Service:     HYSTEROSCOPY N/A 12/30/2016    Procedure: HYSTEROSCOPY;  Surgeon: Kay Mills MD;  [facility];  Service:     RI CYSTO BLADDER W/URETERAL CATHETERIZATION Bilateral 3/27/2017    Procedure: CYSTOSCOPY WITH RETROGRADE PYELOGRAM; RIGHT STENT INSERTION;  Surgeon: Eduardo Du MD;  [facility];  Service: Urology    RI CYSTO BLADDER W/URETERAL CATHETERIZATION N/A 1/11/2018    Procedure: CYSTOSCOPY , BILATERAL RETROGRADE PYELOGRAM WITH RIGHT STENT EXTRACTION;  Surgeon: Eduardo Du MD;  [facility];  Service: Urology    RI CYSTO BLADDER W/URETERAL CATHETERIZATION Right 1/15/2018    Procedure: CYSTOSCOPY, RIGHT RETROGRADE PYELOGRAM WITH INSERTION OF RIGHT STENT URETERAL;  Surgeon: Eduardo Du MD;  [facility];  Service: Urology    RI LAPAROSCOPY W TOTAL HYSTERECTOMY UTERUS 250 GM/< N/A 3/24/2017    Procedure: TOTAL LAPAROSCOPIC HYSTERECTOMY . bilateral salpingectomy, cysto;   Surgeon: Kay Mills MD;  [facility];  Service: Gynecology    REIMPLANT URETER IN BLADDER Right 2/22/2018    Procedure: URETERAL NEOCYSTOSTOMY, EXCISION OF URETERAL STRICTURE.;  Surgeon: Eduardo uD MD;  [facility];  Service: Urology    URETERAL STENT PLACEMENT Right 2/22/2018    Procedure: INSERTION STENT URETERAL;  Surgeon: Eduardo Du MD;  [facility];  Service: Urology       Family History   Problem Relation Age of Onset    Heart attack Mother     Hypertension Mother     Uterine cancer Mother     No Known Problems Daughter     No Known Problems Maternal Grandmother     No Known Problems Maternal Grandfather     No Known Problems Paternal Grandmother     Diabetes type II Paternal Grandfather     Colon cancer Brother     No Known Problems Maternal Aunt     No Known Problems Maternal Aunt     Cancer Paternal Aunt         unsure of type     I have reviewed and agree with the history as documented. E-Cigarette/Vaping    E-Cigarette Use Never User      E-Cigarette/Vaping Substances    Nicotine No     THC No     CBD No     Flavoring No     Other No     Unknown No      Social History     Tobacco Use    Smoking status: Former     Packs/day: 0.20     Years: 12.00     Total pack years: 2.40     Types: Cigarettes     Quit date:      Years since quittin.9    Smokeless tobacco: Never    Tobacco comments:     quit 13 years ago   Vaping Use    Vaping Use: Never used   Substance Use Topics    Alcohol use: Yes     Alcohol/week: 2.0 standard drinks of alcohol     Types: 2 Glasses of wine per week    Drug use: No       Review of Systems   Respiratory:  Positive for shortness of breath. Skin:  Positive for rash. All other systems reviewed and are negative. Physical Exam  Physical Exam  Vitals and nursing note reviewed. Exam conducted with a chaperone present Radha Siddiqui RN.). Constitutional:       General: She is not in acute distress. Appearance: She is well-developed. Comments: Awake, alert, and interactive. Patient constantly scratching at her neck and appears to have stridorous breathing. HENT:      Head: Normocephalic and atraumatic. Mouth/Throat:      Comments: MMM. Oropharynx patent and clear. No erythema, exudates, unilateral swelling, drooling or tripod positioning. Appears to have stridorous breath sounds.   Eyes:      Conjunctiva/sclera: Conjunctivae normal.      Pupils: Pupils are equal, round, and reactive to light. Cardiovascular:      Rate and Rhythm: Normal rate and regular rhythm. Heart sounds: No murmur heard. Pulmonary:      Effort: Pulmonary effort is normal. No respiratory distress. Breath sounds: Normal breath sounds. Comments: Decreased air movement with likely transmitted upper airway sounds. No distinct wheeze, rhonchi or rales. Patient is taking shallow breaths. O2 sat 95% on RA. Patient is not speaking at this time. Abdominal:      Palpations: Abdomen is soft. Tenderness: There is no abdominal tenderness. Musculoskeletal:         General: No swelling. Cervical back: Neck supple. No rigidity. Lymphadenopathy:      Cervical: No cervical adenopathy. Skin:     General: Skin is warm and dry. Capillary Refill: Capillary refill takes less than 2 seconds. Comments: No distinct hives however patient's neck is very erythematous. No macules, papules, vesicles or any other associated findings. No skin disruption. No other rash over the skin. Neurological:      General: No focal deficit present. Mental Status: She is alert and oriented to person, place, and time. GCS: GCS eye subscore is 4. GCS verbal subscore is 5. GCS motor subscore is 6.    Psychiatric:         Mood and Affect: Mood normal.         Vital Signs  ED Triage Vitals   Temperature Pulse Respirations Blood Pressure SpO2   11/23/23 2055 11/23/23 1934 11/23/23 1945 11/23/23 1934 11/23/23 1934   97.7 °F (36.5 °C) (!) 125 (!) 26 166/86 95 %      Temp Source Heart Rate Source Patient Position - Orthostatic VS BP Location FiO2 (%)   11/23/23 2055 11/23/23 1945 11/23/23 1945 11/23/23 1945 --   Oral Monitor Sitting Right arm       Pain Score       --                  Vitals:    11/23/23 2100 11/23/23 2130 11/23/23 2135 11/23/23 2200   BP: 105/52 (!) 86/51 95/54 111/56   Pulse: 95 92 96 95   Patient Position - Orthostatic VS: Sitting Lying Lying Lying         Visual Acuity  Visual Acuity      Flowsheet Row Most Recent Value   L Pupil Size (mm) 3   R Pupil Size (mm) 3            ED Medications  Medications   EPINEPHrine PF (ADRENALIN) 1 mg/mL injection 0.3 mg (0.3 mg Intramuscular Given 11/23/23 1934)   Famotidine (PF) (PEPCID) injection 20 mg (20 mg Intravenous Given 11/23/23 1941)   methylPREDNISolone sodium succinate (Solu-MEDROL) injection 125 mg (125 mg Intravenous Given by Other 11/23/23 1941)   sodium chloride 0.9 % bolus 1,000 mL (0 mL Intravenous Stopped 11/23/23 2055)   LORazepam (ATIVAN) injection 0.5 mg (0.5 mg Intravenous Given 11/23/23 1949)   LORazepam (ATIVAN) injection 0.5 mg (0.5 mg Intravenous Given 11/23/23 2009)   racepinephrine 2.25 % inhalation solution 0.5 mL (0.5 mL Inhalation Given 11/23/23 2009)       Diagnostic Studies  Results Reviewed       None                   XR chest 1 view portable   ED Interpretation by Chucky Perez PA-C (11/23 2050)   ED wet read:  No acute cardiopulmonary disease appreciated. Procedures  ECG 12 Lead Documentation Only    Date/Time: 11/23/2023 8:51 PM    Performed by: Chucky Perez PA-C  Authorized by: Chucky Perez PA-C    Indications / Diagnosis:  Tachycardia, trouble breathing, anaphylaxis  ECG reviewed by me, the ED Provider: yes    Patient location:  ED  Quality:     Tracing quality:  Limited by artifact  Rate:     ECG rate:  89    ECG rate assessment: normal    Rhythm:     Rhythm: sinus rhythm    Ectopy:     Ectopy: none    QRS:     QRS axis:  Normal    QRS intervals:  Normal  Conduction:     Conduction: normal    ST segments:     ST segments:  Normal  T waves:     T waves: inverted      Inverted:  AVL           ED Course  ED Course as of 11/23/23 2320   Thu Nov 23, 2023 2029 Patient continued to be stridorous without much improvement after the initial IM epinephrine dose along with the other medications.   There is some concern for possible psychogenic cause. Will add additional 0.5 Mg of Ativan and give a dose of racemic epi. Will continue to closely monitor. Dr. Carlisle Ganser in agreement with plan/management thus far. 2047 On bedside reevaluation patient continues to rest in the stretcher, no acute distress. No longer stridorous. Speaking in complete sentences without issue. Reports significant improvement of her itching and breathing issues. She has no other complaints at this time. She is requesting to go to the bathroom and have some water. Will continue to closely monitor patient for a little while longer however will start to plan for discharge. 2050 No acute findings appreciated on CXR, pending official read. 2050 Blood Pressure: 121/73   2050 Pulse: 83   2050 Respirations: 18   2050 SpO2: 100 %   2233 At time of discharge patient was sleeping comfortably in stretcher no acute distress. Easily arousable awake, alert and interactive. Has no complaints at this time. Reports resolution of her presenting symptoms. States she feels safe to go home. Patient is getting a ride home from family this evening. Will send EpiPen's to patient's pharmacy as needed for recurrent severe allergic reaction. Advised patient follow-up with PCP on Monday for reevaluation and further management. Other supportive care and follow-up as outlined in the AVS.  Strict return precautions verbally communicated to the patient as outlined in the AVS.  All patient questions and concerns were answered. Patient verbally communicated their understanding and agreement to the above plan. Patient stable at discharge. Portions of the record may have been created with voice recognition software. Occasional wrong word or "sound a like" substitutions may have occurred due to the inherent limitations of voice recognition software. Read the chart carefully and recognize, using context, where substitutions have occurred.      2234 Patient has had low blood pressures previously. Her office visit from 9/11/2023 her blood pressure was 96/64. Patient has no complaints this time that would indicate symptomatic hypotension. SBIRT 22yo+      Flowsheet Row Most Recent Value   Initial Alcohol Screen: US AUDIT-C     1. How often do you have a drink containing alcohol? 0 Filed at: 11/23/2023 1955   2. How many drinks containing alcohol do you have on a typical day you are drinking? 0 Filed at: 11/23/2023 1955   3a. Male UNDER 65: How often do you have five or more drinks on one occasion? 0 Filed at: 11/23/2023 1955   3b. FEMALE Any Age, or MALE 65+: How often do you have 4 or more drinks on one occassion? 0 Filed at: 11/23/2023 1955   Audit-C Score 0 Filed at: 11/23/2023 1955   ISABELA: How many times in the past year have you. .. Used an illegal drug or used a prescription medication for non-medical reasons? Never Filed at: 11/23/2023 1955                      Medical Decision Making  DDx including but not limited to: Anaphylaxis, allergic reaction, laryngeal spasm, psychogenic. Doubt angioedema, cellulitis, ingested foreign body, epiglottitis. Patient was initially treated with 0.3 Mg of IM epinephrine, 125 mg Solu-Medrol, 20 Mg Pepcid and 1 L NS in light of presenting symptoms and concern for anaphylaxis/severe allergic reaction. .  Will also give 0.5 Mg of Ativan in light of stridorous breath sounds with potential for vocal cord spasm. EKG and CXR were obtained in light of patient with complaints of difficulty breathing and tachycardia on arrival.  Will continue to closely monitor patient. Amount and/or Complexity of Data Reviewed  Radiology: ordered and independent interpretation performed. Risk  OTC drugs. Prescription drug management. Disposition  Final diagnoses:    Allergic reaction, initial encounter   Spasm of vocal cords     Time reflects when diagnosis was documented in both MDM as applicable and the Disposition within this note       Time User Action Codes Description Comment    11/23/2023  9:16 PM Moris Brown Add [T78.40XA] Allergic reaction, initial encounter     11/23/2023  9:16 PM Moris Brown Add [J38.5] Spasm of vocal cords           ED Disposition       ED Disposition   Discharge    Condition   Stable    Date/Time   Thu Nov 23, 2023 2230    Comment   Génesis Villagran discharge to home/self care.                    Follow-up Information       Follow up With Specialties Details Why Contact Info Additional [address] Otolaryngology Call in 2 days For further evaluation [phone] [address]  [address] [facility] [id number] [address] [place], [address] [facility] [address] [place], [place] [address]    [address] [facility] Emergency Department Emergency Medicine Go to  As needed, If symptoms worsen [address] [address]  [address] [facility] Emergency Department, [address] [place] [address], [address]            Discharge Medication List as of 11/23/2023 10:35 PM        START taking these medications    Details   EPINEPHrine (EPIPEN) 0.3 mg/0.3 mL SOAJ Inject 0.1 mL (0.1 mg total) into a muscle once for 1 dose As needed for severe allergic reaction for weight of 7.5-15 kg, Starting Thu 11/23/2023, Print           CONTINUE these medications which have NOT CHANGED    Details   ciprofloxacin (CIPRO) 500 mg tablet Take 1 tablet (500 mg total) by mouth every 24 hours for 3 days, Starting Tue 11/21/2023, Until Fri 11/24/2023, Normal      FLUoxetine (PROzac) 10 mg capsule TAKE 1 CAPSULE BY MOUTH EVERY DAY, Starting Thu 8/3/2023, Normal                 PDMP Review       None            ED Provider  Electronically Signed by             Lary Newell PA-C  11/23/23 9993

## 2023-11-30 ENCOUNTER — OFFICE VISIT (OUTPATIENT)
Dept: FAMILY MEDICINE CLINIC | Facility: CLINIC | Age: 43
End: 2023-11-30
Payer: COMMERCIAL

## 2023-11-30 VITALS
DIASTOLIC BLOOD PRESSURE: 70 MMHG | OXYGEN SATURATION: 96 % | WEIGHT: 126.4 LBS | HEIGHT: 67 IN | HEART RATE: 79 BPM | BODY MASS INDEX: 19.84 KG/M2 | SYSTOLIC BLOOD PRESSURE: 102 MMHG | RESPIRATION RATE: 16 BRPM | TEMPERATURE: 96.9 F

## 2023-11-30 DIAGNOSIS — T61.781A ALLERGIC REACTION TO SHELLFISH: Primary | ICD-10-CM

## 2023-11-30 PROCEDURE — 99213 OFFICE O/P EST LOW 20 MIN: CPT | Performed by: FAMILY MEDICINE

## 2023-11-30 NOTE — PROGRESS NOTES
Name: Tramaine Aleman      : 1980      MRN: [id number]  Encounter Provider: Floyd Biswas MD  Encounter Date: 2023   Encounter department: [place] [address]. Allergic reaction to shellfish  Comments:  Recently evaluated in the ED following possible shellfish exposure. Records reviewed. Now has a epi pen which she carries at all times. Allergy tesitng order but explained the skin test would be more accurate. Referred to allergist   Orders:  -     Ambulatory Referral to Allergy; Future  -     [facility] Panel, Adult; Future  -     Food Allergy Profile w/Reflex; Future         Subjective      Seen today for ER follow up. Developed angioedema and hives after attending thanksgiving dinner. History of shellfish allergy but was not in contact with shellfish. When she arrived, she was hypotensive and required epinephrine. She recovered and was given epi pens. She is requesting allergy testing to determine any other food allergies she be aware of       Review of Systems   HENT:  Positive for trouble swallowing (resolved) and voice change (resolved). Respiratory:  Positive for chest tightness (resolved) and shortness of breath (resolved). Skin:  Positive for rash (resolved). Neurological:  Positive for light-headedness (resolved). Current Outpatient Medications on File Prior to Visit   Medication Sig   • EPINEPHrine (EPIPEN) 0.3 mg/0.3 mL SOAJ Inject 0.1 mL (0.1 mg total) into a muscle once for 1 dose As needed for severe allergic reaction for weight of 7.5-15 kg   • FLUoxetine (PROzac) 10 mg capsule TAKE 1 CAPSULE BY MOUTH EVERY DAY       Objective     /70 (BP Location: Left arm, Patient Position: Sitting, Cuff Size: Adult)   Pulse 79   Temp (!) 96.9 °F (36.1 °C) (Tympanic)   Resp 16   Ht 5' 6.5" (1.689 m)   Wt 57.3 kg (126 lb 6.4 oz)   SpO2 96%   BMI 20.10 kg/m²     Physical Exam  Constitutional:       Appearance: Normal appearance. Eyes:      Extraocular Movements: Extraocular movements intact. Pulmonary:      Effort: Pulmonary effort is normal.   Skin:     General: Skin is warm. Neurological:      Mental Status: She is alert and oriented to person, place, and time.        Adele Castillo MD

## 2023-12-05 ENCOUNTER — TELEPHONE (OUTPATIENT)
Age: 43
End: 2023-12-05

## 2023-12-05 NOTE — TELEPHONE ENCOUNTER
Patient called requesting amoxicillin - states she feels the start of an ear infection    Please advise        [address] [facility] ([place]) [place], [place] - [address] [address]  Phone: [phone] Fax: [phone]

## 2023-12-05 NOTE — TELEPHONE ENCOUNTER
Spoke with patient, patient is requesting to get a phone call back from some one in the office, in regards to an antibiotic question for Amoxicillin. Please advise.

## 2023-12-06 NOTE — TELEPHONE ENCOUNTER
Please ask to wait 48 hours as most resolve on their own. If pain persist and or drainage/fever occurs, call back or message me via [employer].

## 2023-12-07 ENCOUNTER — PATIENT MESSAGE (OUTPATIENT)
Dept: FAMILY MEDICINE CLINIC | Facility: CLINIC | Age: 43
End: 2023-12-07

## 2023-12-07 DIAGNOSIS — J06.9 UPPER RESPIRATORY TRACT INFECTION, UNSPECIFIED TYPE: Primary | ICD-10-CM

## 2023-12-07 RX ORDER — AMOXICILLIN AND CLAVULANATE POTASSIUM 875; 125 MG/1; MG/1
1 TABLET, FILM COATED ORAL EVERY 12 HOURS SCHEDULED
Qty: 14 TABLET | Refills: 0 | Status: SHIPPED | OUTPATIENT
Start: 2023-12-07 | End: 2023-12-14

## 2023-12-07 RX ORDER — FLUTICASONE PROPIONATE 50 MCG
1 SPRAY, SUSPENSION (ML) NASAL DAILY
Qty: 9.9 ML | Refills: 0 | Status: SHIPPED | OUTPATIENT
Start: 2023-12-07

## 2023-12-07 NOTE — PATIENT COMMUNICATION
Patient messaged stated ear is not feeling any better and is still congested on the left side. Patient would like to know if another antibiotic could be called into the [address] at the campus.

## 2024-01-20 PROBLEM — N30.00 ACUTE CYSTITIS WITHOUT HEMATURIA: Status: RESOLVED | Noted: 2023-11-21 | Resolved: 2024-01-20

## 2024-04-02 ENCOUNTER — TELEPHONE (OUTPATIENT)
Age: 44
End: 2024-04-02

## 2024-04-02 NOTE — TELEPHONE ENCOUNTER
Called patient to verify what eye drops she is asking for as there is no eye drops on her active medication list. No answer, unable to leave message

## 2024-04-03 ENCOUNTER — PATIENT MESSAGE (OUTPATIENT)
Dept: FAMILY MEDICINE CLINIC | Facility: CLINIC | Age: 44
End: 2024-04-03

## 2024-04-03 DIAGNOSIS — H00.019 HORDEOLUM, UNSPECIFIED HORDEOLUM TYPE, UNSPECIFIED LATERALITY: Primary | ICD-10-CM

## 2024-04-03 RX ORDER — ERYTHROMYCIN 5 MG/G
0.5 OINTMENT OPHTHALMIC
Qty: 7 G | Refills: 0 | Status: SHIPPED | OUTPATIENT
Start: 2024-04-03 | End: 2024-04-08

## 2024-04-05 ENCOUNTER — PATIENT MESSAGE (OUTPATIENT)
Dept: FAMILY MEDICINE CLINIC | Facility: CLINIC | Age: 44
End: 2024-04-05

## 2024-04-05 DIAGNOSIS — H00.019 HORDEOLUM, UNSPECIFIED HORDEOLUM TYPE, UNSPECIFIED LATERALITY: Primary | ICD-10-CM

## 2024-04-08 RX ORDER — OFLOXACIN 3 MG/ML
2 SOLUTION/ DROPS OPHTHALMIC 4 TIMES DAILY
Qty: 10 ML | Refills: 0 | Status: SHIPPED | OUTPATIENT
Start: 2024-04-08

## 2024-04-08 RX ORDER — OFLOXACIN 3 MG/ML
2 SOLUTION/ DROPS OPHTHALMIC 4 TIMES DAILY
Qty: 10 ML | Refills: 0 | Status: SHIPPED | OUTPATIENT
Start: 2024-04-08 | End: 2024-04-08

## 2024-04-10 DIAGNOSIS — F41.1 GAD (GENERALIZED ANXIETY DISORDER): ICD-10-CM

## 2024-04-10 DIAGNOSIS — F41.8 SITUATIONAL ANXIETY: ICD-10-CM

## 2024-04-10 RX ORDER — FLUOXETINE 10 MG/1
10 CAPSULE ORAL DAILY
Qty: 90 CAPSULE | Refills: 1 | Status: SHIPPED | OUTPATIENT
Start: 2024-04-10

## 2024-04-16 ENCOUNTER — OFFICE VISIT (OUTPATIENT)
Dept: FAMILY MEDICINE CLINIC | Facility: CLINIC | Age: 44
End: 2024-04-16
Payer: COMMERCIAL

## 2024-04-16 VITALS
TEMPERATURE: 95.6 F | RESPIRATION RATE: 16 BRPM | OXYGEN SATURATION: 95 % | HEART RATE: 87 BPM | SYSTOLIC BLOOD PRESSURE: 120 MMHG | DIASTOLIC BLOOD PRESSURE: 78 MMHG | WEIGHT: 127.2 LBS | BODY MASS INDEX: 19.97 KG/M2 | HEIGHT: 67 IN

## 2024-04-16 DIAGNOSIS — H00.019 HORDEOLUM, UNSPECIFIED HORDEOLUM TYPE, UNSPECIFIED LATERALITY: ICD-10-CM

## 2024-04-16 DIAGNOSIS — Z00.00 ENCOUNTER FOR ANNUAL PHYSICAL EXAM: Primary | ICD-10-CM

## 2024-04-16 DIAGNOSIS — F41.1 GAD (GENERALIZED ANXIETY DISORDER): ICD-10-CM

## 2024-04-16 DIAGNOSIS — T61.781A ALLERGIC REACTION TO SHELLFISH: ICD-10-CM

## 2024-04-16 PROCEDURE — 99396 PREV VISIT EST AGE 40-64: CPT | Performed by: FAMILY MEDICINE

## 2024-04-16 NOTE — PROGRESS NOTES
ADULT ANNUAL PHYSICAL  [facility]    NAME: Génesis Villagran  AGE: 43 y.o. SEX: female  : 1980     DATE: 2024     Assessment and Plan:     Problem List Items Addressed This Visit       MARLIN (generalized anxiety disorder)  Stable on prozac. Pt wishes to continue this for now      Other Visit Diagnoses       Encounter for annual physical exam    -  Primary  Unremarkable female exam. Exercising and consumes a balanced diet. Plans to schedule colonoscopy this summer. Last one was done in  and asked to repeat in 3 years due to a personal history of colon polyps and family history of colon cancer. . UTD for mammogram     Hordeolum, unspecified hordeolum type, unspecified laterality        History of hordeleum. Typiclaly brought on by stress and ofloxacin helps.    Allergic reaction to shellfish        Has an appt with allergist next month and carries an api pen at all times            Immunizations and preventive care screenings were discussed with patient today. Appropriate education was printed on patient's after visit summary.    Counseling:  Dental Health: discussed importance of regular tooth brushing, flossing, and dental visits.  Exercise: the importance of regular exercise/physical activity was discussed. Recommend exercise 3-5 times per week for at least 30 minutes.          No follow-ups on file.     Chief Complaint:     Chief Complaint   Patient presents with    Follow-up     6-8 month follow up    Care Gap Request     PHQ      History of Present Illness:     Adult Annual Physical   Patient here for a comprehensive physical exam. The patient reports no problems.  Starting a new job on Monday!  Doing well on prozac    Diet and Physical Activity  Diet/Nutrition: well balanced diet.   Exercise:  4-5 days a week .      Depression Screening  PHQ-2/9 Depression Screening    Little interest or pleasure in doing things: 0 - not at all  Feeling down,  depressed, or hopeless: 0 - not at all  PHQ-2 Score: 0  PHQ-2 Interpretation: Negative depression screen       General Health  Sleep:  5-6 hours .   Hearing: normal - bilateral.  Vision: no vision problems.   Dental:  January .       /GYN Health  Follows with gynecology? no   Patient is: s/p hysterectomy  Last menstrual period: 7 years ago   Contraceptive method:  hysterectomy .       Review of Systems:     Review of Systems   Past Medical History:     Past Medical History:   Diagnosis Date    COVID-19     Diverticulosis     Hydronephrosis     Migraine     PONV (postoperative nausea and vomiting)     TMJ arthralgia     Resolved 10/29/2015     Ureteral stricture       Past Surgical History:     Past Surgical History:   Procedure Laterality Date    APPENDECTOMY      CYSTOSCOPY W/ LASER LITHOTRIPSY Right 11/2/2017    Procedure: CYSTOSCOPY;  RIGHT URETEROSCOPY WITH  HOLMIUM LASER INCISION OF URETERAL STRICTURE; (WITH HYDROSTATIC RIGHT URETERAL DILATION), BILATERAL RETROGRADE PYELOGRAM AND INSERTION OF RIGHT URETERAL STENT X 2 (4.7 X 26);  Surgeon: sOei Bedoya MD;  [facility];  Service: Urology    DILATION AND CURETTAGE OF UTERUS N/A 12/30/2016    Procedure: DILATATION AND CURETTAGE;  Surgeon: Ankur Elder MD;  [facility];  Service:     DILATION AND EVACUATION      HYSTERECTOMY N/A 5/22/2017    Procedure: EXAM UNDER ANESTHESIA;  Surgeon: Ankur Elder MD;  [facility];  Service:     HYSTEROSCOPY N/A 12/30/2016    Procedure: HYSTEROSCOPY;  Surgeon: Ankur Elder MD;  [facility];  Service:     ND CYSTO BLADDER W/URETERAL CATHETERIZATION Bilateral 3/27/2017    Procedure: CYSTOSCOPY WITH RETROGRADE PYELOGRAM; RIGHT STENT INSERTION;  Surgeon: Osei Bedoya MD;  [facility];  Service: Urology    ND CYSTO BLADDER W/URETERAL CATHETERIZATION N/A 1/11/2018    Procedure: CYSTOSCOPY , BILATERAL RETROGRADE PYELOGRAM WITH RIGHT STENT EXTRACTION;  Surgeon: Osei Bedoya MD;   [facility];  Service: Urology    SC CYSTO BLADDER W/URETERAL CATHETERIZATION Right 1/15/2018    Procedure: CYSTOSCOPY, RIGHT RETROGRADE PYELOGRAM WITH INSERTION OF RIGHT STENT URETERAL;  Surgeon: Osei Bedoya MD;  [facility];  Service: Urology    SC LAPAROSCOPY W TOTAL HYSTERECTOMY UTERUS 250 GM/< N/A 3/24/2017    Procedure: TOTAL LAPAROSCOPIC HYSTERECTOMY . bilateral salpingectomy, cysto;  Surgeon: Ankur Elder MD;  [facility];  Service: Gynecology    REIMPLANT URETER IN BLADDER Right 2018    Procedure: URETERAL NEOCYSTOSTOMY, EXCISION OF URETERAL STRICTURE.;  Surgeon: Osei Bedoya MD;  [facility];  Service: Urology    URETERAL STENT PLACEMENT Right 2018    Procedure: INSERTION STENT URETERAL;  Surgeon: Osei Bedoya MD;  [facility];  Service: Urology      Social History:     Social History     Socioeconomic History    Marital status: /Civil Union     Spouse name: None    Number of children: None    Years of education: None    Highest education level: None   Occupational History    Occupation:    Tobacco Use    Smoking status: Former     Current packs/day: 0.00     Average packs/day: 0.2 packs/day for 12.0 years (2.4 ttl pk-yrs)     Types: Cigarettes     Start date:      Quit date:      Years since quittin.3    Smokeless tobacco: Never    Tobacco comments:     quit 13 years ago   Vaping Use    Vaping status: Never Used   Substance and Sexual Activity    Alcohol use: Yes     Alcohol/week: 2.0 standard drinks of alcohol     Types: 2 Glasses of wine per week    Drug use: No    Sexual activity: Yes     Partners: Male   Other Topics Concern    None   Social History Narrative    None     Social Determinants of Health     Financial Resource Strain: Not on file   Food Insecurity: Not on file   Transportation Needs: Not on file   Physical Activity: Not on file   Stress: Not on file   Social Connections: Not on file  "  Intimate Partner Violence: Not on file   Housing Stability: Not on file      Family History:     Family History   Problem Relation Age of Onset    Heart attack Mother     Hypertension Mother     Uterine cancer Mother     No Known Problems Daughter     No Known Problems Maternal Grandmother     No Known Problems Maternal Grandfather     No Known Problems Paternal Grandmother     Diabetes type II Paternal Grandfather     Colon cancer Brother     No Known Problems Maternal Aunt     No Known Problems Maternal Aunt     Cancer Paternal Aunt         unsure of type      Current Medications:     Current Outpatient Medications   Medication Sig Dispense Refill    EPINEPHrine (EPIPEN) 0.3 mg/0.3 mL SOAJ Inject 0.1 mL (0.1 mg total) into a muscle once for 1 dose As needed for severe allergic reaction for weight of 7.5-15 kg 0.2 mL 0    FLUoxetine (PROzac) 10 mg capsule Take 1 capsule (10 mg total) by mouth daily 90 capsule 1    fluticasone (FLONASE) 50 mcg/act nasal spray 1 spray into each nostril daily 9.9 mL 0    ofloxacin (OCUFLOX) 0.3 % ophthalmic solution Administer 2 drops to both eyes 4 (four) times a day 10 mL 0     No current facility-administered medications for this visit.      Allergies:     Allergies   Allergen Reactions    Shellfish-Derived Products - Food Allergy Anaphylaxis     THROAT ITCHY MIGRAINES    Fentanyl Itching    Ginger-Fennel - Food Allergy Hives      Physical Exam:     /78 (BP Location: Left arm, Patient Position: Sitting, Cuff Size: Adult)   Pulse 87   Temp (!) 95.6 °F (35.3 °C) (Tympanic)   Resp 16   Ht 5' 6.5\" (1.689 m)   Wt 57.7 kg (127 lb 3.2 oz)   SpO2 95%   BMI 20.22 kg/m²     Physical Exam  Constitutional:       General: She is not in acute distress.     Appearance: Normal appearance. She is not ill-appearing or toxic-appearing.   HENT:      Head: Normocephalic and atraumatic.      Right Ear: Tympanic membrane normal.      Left Ear: Tympanic membrane normal.      Nose: No " congestion.      Mouth/Throat:      Mouth: Mucous membranes are moist.   Eyes:      Extraocular Movements: Extraocular movements intact.   Cardiovascular:      Rate and Rhythm: Normal rate and regular rhythm.      Heart sounds: No murmur heard.  Pulmonary:      Effort: Pulmonary effort is normal.      Breath sounds: Normal breath sounds.   Abdominal:      General: There is no distension.      Palpations: Abdomen is soft. There is no mass.      Tenderness: There is no abdominal tenderness. There is no guarding or rebound.      Hernia: No hernia is present.   Lymphadenopathy:      Cervical: No cervical adenopathy.   Skin:     General: Skin is warm.   Neurological:      Mental Status: She is alert and oriented to person, place, and time.   Psychiatric:         Behavior: Behavior normal.          Missy Mendosa MD  [facility]

## 2024-05-25 PROBLEM — J30.81 ALLERGIC RHINITIS DUE TO ANIMAL (CAT) (DOG) HAIR AND DANDER: Status: ACTIVE | Noted: 2024-05-25

## 2024-05-25 PROBLEM — J30.1 CHRONIC SEASONAL ALLERGIC RHINITIS DUE TO POLLEN: Status: ACTIVE | Noted: 2024-05-25

## 2024-05-25 PROBLEM — R39.9 UTI SYMPTOMS: Status: ACTIVE | Noted: 2018-01-02

## 2024-05-25 PROBLEM — J30.89 ALLERGIC RHINITIS DUE TO HOUSE DUST MITE: Status: ACTIVE | Noted: 2024-05-25

## 2024-10-10 ENCOUNTER — TELEPHONE (OUTPATIENT)
Dept: GASTROENTEROLOGY | Facility: CLINIC | Age: 44
End: 2024-10-10

## 2024-10-10 ENCOUNTER — PREP FOR PROCEDURE (OUTPATIENT)
Dept: GASTROENTEROLOGY | Facility: CLINIC | Age: 44
End: 2024-10-10

## 2024-10-10 DIAGNOSIS — Z86.0100 HISTORY OF COLON POLYPS: Primary | ICD-10-CM

## 2024-10-10 DIAGNOSIS — Z80.0 FAMILY HISTORY OF COLON CANCER: ICD-10-CM

## 2024-10-10 RX ORDER — BISACODYL 5 MG/1
10 TABLET, DELAYED RELEASE ORAL ONCE
Qty: 2 TABLET | Refills: 0 | Status: SHIPPED | OUTPATIENT
Start: 2024-10-10 | End: 2024-10-10

## 2024-10-10 NOTE — TELEPHONE ENCOUNTER
ER F/U-LEFT VM   Pt scheduled for 1/9/25 for colonoscopy w Dr. Mendosa. Please send golytely script to pharmacy on file. Thank you.

## 2024-10-10 NOTE — TELEPHONE ENCOUNTER
Scheduled date of colonoscopy (as of today): 1/9/25  Physician performing colonoscopy: Dr. Mendosa  Location of colonoscopy: [facility]  Bowel prep reviewed with patient: golytely  Instructions reviewed with patient by: wendy mailed to address on file  Clearances: n/a

## 2024-11-01 ENCOUNTER — OFFICE VISIT (OUTPATIENT)
Dept: FAMILY MEDICINE CLINIC | Facility: CLINIC | Age: 44
End: 2024-11-01
Payer: COMMERCIAL

## 2024-11-01 VITALS
DIASTOLIC BLOOD PRESSURE: 60 MMHG | WEIGHT: 122.4 LBS | BODY MASS INDEX: 19.21 KG/M2 | SYSTOLIC BLOOD PRESSURE: 100 MMHG | HEART RATE: 83 BPM | HEIGHT: 67 IN | OXYGEN SATURATION: 96 % | RESPIRATION RATE: 16 BRPM | TEMPERATURE: 97.7 F

## 2024-11-01 DIAGNOSIS — Z12.31 ENCOUNTER FOR SCREENING MAMMOGRAM FOR BREAST CANCER: ICD-10-CM

## 2024-11-01 DIAGNOSIS — J01.40 ACUTE NON-RECURRENT PANSINUSITIS: Primary | ICD-10-CM

## 2024-11-01 PROCEDURE — 99213 OFFICE O/P EST LOW 20 MIN: CPT | Performed by: FAMILY MEDICINE

## 2024-11-01 NOTE — PROGRESS NOTES
Ambulatory Visit  Name: Génesis Villagran      : 1980      MRN: [id number]  Encounter Provider: Missy Mendosa MD  Encounter Date: 2024   Encounter department: [facility]    Assessment & Plan  Acute non-recurrent pansinusitis  Sinus symptoms, nausea, fatigue, and cough for several weeks. + sick contact. Nausea likely due to PND. Recommend flonase. Avoid using afrin for more than 72 hours. Start Augmentin BID x 7 days.   Orders:    amoxicillin-clavulanate (AUGMENTIN) 875-125 mg per tablet; Take 1 tablet by mouth every 12 (twelve) hours for 7 days    Encounter for screening mammogram for breast cancer  Last done in 2023. Due for repeat screening  Orders:    Mammo screening bilateral w 3d and cad; Future       History of Present Illness     Presents with nasal congestion, sinus pressure, productive cough, headaches, nausea, and abodminal pain. Started several weeks ago.  also sick   No fever, sore throat, or diarrhea      History obtained from : patient  Review of Systems  Medical History Reviewed by provider this encounter:       Past Medical History   Past Medical History:   Diagnosis Date    COVID-19     Diverticulosis     Hydronephrosis     Migraine     PONV (postoperative nausea and vomiting)     TMJ arthralgia     Resolved 10/29/2015     Ureteral stricture      Past Surgical History:   Procedure Laterality Date    APPENDECTOMY      CYSTOSCOPY W/ LASER LITHOTRIPSY Right 2017    Procedure: CYSTOSCOPY;  RIGHT URETEROSCOPY WITH  HOLMIUM LASER INCISION OF URETERAL STRICTURE; (WITH HYDROSTATIC RIGHT URETERAL DILATION), BILATERAL RETROGRADE PYELOGRAM AND INSERTION OF RIGHT URETERAL STENT X 2 (4.7 X 26);  Surgeon: Osei Bedoya MD;  [facility];  Service: Urology    DILATION AND CURETTAGE OF UTERUS N/A 2016    Procedure: DILATATION AND CURETTAGE;  Surgeon: Ankur Elder MD;  [facility];  Service:     DILATION AND EVACUATION      HYSTERECTOMY  N/A 05/22/2017    Procedure: EXAM UNDER ANESTHESIA;  Surgeon: Ankur Elder MD;  [facility];  Service:     HYSTEROSCOPY N/A 12/30/2016    Procedure: HYSTEROSCOPY;  Surgeon: Ankur Elder MD;  [facility];  Service:     TX CYSTO BLADDER W/URETERAL CATHETERIZATION Bilateral 03/27/2017    Procedure: CYSTOSCOPY WITH RETROGRADE PYELOGRAM; RIGHT STENT INSERTION;  Surgeon: Osei Bedoya MD;  [facility];  Service: Urology    TX CYSTO BLADDER W/URETERAL CATHETERIZATION N/A 01/11/2018    Procedure: CYSTOSCOPY , BILATERAL RETROGRADE PYELOGRAM WITH RIGHT STENT EXTRACTION;  Surgeon: Osei Bedoya MD;  [facility];  Service: Urology    TX CYSTO BLADDER W/URETERAL CATHETERIZATION Right 01/15/2018    Procedure: CYSTOSCOPY, RIGHT RETROGRADE PYELOGRAM WITH INSERTION OF RIGHT STENT URETERAL;  Surgeon: Osei Bedoya MD;  [facility];  Service: Urology    TX LAPAROSCOPY W TOTAL HYSTERECTOMY UTERUS 250 GM/< N/A 03/24/2017    Procedure: TOTAL LAPAROSCOPIC HYSTERECTOMY . bilateral salpingectomy, cysto;  Surgeon: Ankur Elder MD;  [facility];  Service: Gynecology    REIMPLANT URETER IN BLADDER Right 02/22/2018    Procedure: URETERAL NEOCYSTOSTOMY, EXCISION OF URETERAL STRICTURE.;  Surgeon: Osei Bedoya MD;  [facility];  Service: Urology    URETERAL STENT PLACEMENT Right 02/22/2018    Procedure: INSERTION STENT URETERAL;  Surgeon: Osei Bedoya MD;  [facility];  Service: Urology    WISDOM TOOTH EXTRACTION      2 removed     Family History   Problem Relation Age of Onset    Heart attack Mother         S/P triple Bypass    Hypertension Mother     Uterine cancer Mother     Diabetes Mother     Glaucoma Father     Hearing loss Father     Colon cancer Brother     No Known Problems Maternal Grandmother     No Known Problems Maternal Grandfather     No Known Problems Paternal Grandmother     Diabetes type II Paternal Grandfather     No Known Problems Daughter      No Known Problems Maternal Aunt     No Known Problems Maternal Aunt     Cancer Paternal Aunt         unsure of type    No Known Problems Son      Current Outpatient Medications on File Prior to Visit   Medication Sig Dispense Refill    FLUoxetine (PROzac) 10 mg capsule Take 1 capsule (10 mg total) by mouth daily 90 capsule 1    fluticasone (FLONASE) 50 mcg/act nasal spray 1 spray into each nostril daily 9.9 mL 0    Multiple Vitamins-Minerals (Multivitamin Adult) CHEW Chew      ofloxacin (OCUFLOX) 0.3 % ophthalmic solution Administer 2 drops to both eyes 4 (four) times a day 10 mL 0    bisacodyl (DULCOLAX) 5 mg EC tablet Take 2 tablets (10 mg total) by mouth once for 1 dose 2 tablet 0    EPINEPHrine (EPIPEN) 0.3 mg/0.3 mL SOAJ Inject 0.1 mL (0.1 mg total) into a muscle once for 1 dose As needed for severe allergic reaction for weight of 7.5-15 kg 0.2 mL 0    polyethylene glycol (GOLYTELY) 4000 mL solution Take 4,000 mL by mouth once for 1 dose 4000 mL 0     No current facility-administered medications on file prior to visit.     Allergies   Allergen Reactions    Shellfish-Derived Products - Food Allergy Anaphylaxis     THROAT ITCHY MIGRAINES    Fentanyl Itching    Ginger-Fennel - Food Allergy Hives      Current Outpatient Medications on File Prior to Visit   Medication Sig Dispense Refill    FLUoxetine (PROzac) 10 mg capsule Take 1 capsule (10 mg total) by mouth daily 90 capsule 1    fluticasone (FLONASE) 50 mcg/act nasal spray 1 spray into each nostril daily 9.9 mL 0    Multiple Vitamins-Minerals (Multivitamin Adult) CHEW Chew      ofloxacin (OCUFLOX) 0.3 % ophthalmic solution Administer 2 drops to both eyes 4 (four) times a day 10 mL 0    bisacodyl (DULCOLAX) 5 mg EC tablet Take 2 tablets (10 mg total) by mouth once for 1 dose 2 tablet 0    EPINEPHrine (EPIPEN) 0.3 mg/0.3 mL SOAJ Inject 0.1 mL (0.1 mg total) into a muscle once for 1 dose As needed for severe allergic reaction for weight of 7.5-15 kg 0.2 mL 0     "polyethylene glycol (GOLYTELY) 4000 mL solution Take 4,000 mL by mouth once for 1 dose 4000 mL 0     No current facility-administered medications on file prior to visit.      Social History     Tobacco Use    Smoking status: Former     Current packs/day: 0.00     Average packs/day: 0.2 packs/day for 12.0 years (2.4 ttl pk-yrs)     Types: Cigarettes     Start date:      Quit date:      Years since quittin.8     Passive exposure: Past    Smokeless tobacco: Never    Tobacco comments:     quit 13 years ago   Vaping Use    Vaping status: Never Used   Substance and Sexual Activity    Alcohol use: Not Currently     Alcohol/week: 0.0 - 3.0 standard drinks of alcohol    Drug use: No    Sexual activity: Yes     Partners: Male         Objective     /60 (BP Location: Left arm, Patient Position: Sitting, Cuff Size: Standard)   Pulse 83   Temp 97.7 °F (36.5 °C) (Tympanic)   Resp 16   Ht 5' 6.5\" (1.689 m)   Wt 55.5 kg (122 lb 6.4 oz)   SpO2 96%   BMI 19.46 kg/m²     Physical Exam  Vitals reviewed.   Constitutional:       Comments: Tired appearing    HENT:      Head: Normocephalic and atraumatic.      Comments: Fluid behind TM      Nose: Congestion present.      Right Sinus: Maxillary sinus tenderness and frontal sinus tenderness present.      Left Sinus: Maxillary sinus tenderness and frontal sinus tenderness present.      Mouth/Throat:      Mouth: Mucous membranes are moist.      Pharynx: No oropharyngeal exudate or posterior oropharyngeal erythema.   Eyes:      Extraocular Movements: Extraocular movements intact.   Cardiovascular:      Rate and Rhythm: Normal rate and regular rhythm.      Heart sounds: No murmur heard.  Pulmonary:      Effort: Pulmonary effort is normal. No respiratory distress.      Breath sounds: Normal breath sounds. No stridor. No wheezing, rhonchi or rales.   Abdominal:      General: There is no distension.      Palpations: Abdomen is soft. There is no mass.      Tenderness: There " is abdominal tenderness in the right lower quadrant, left upper quadrant and left lower quadrant. There is no guarding or rebound.      Hernia: No hernia is present.   Lymphadenopathy:      Cervical: Cervical adenopathy present.   Skin:     General: Skin is warm.   Neurological:      Mental Status: She is oriented to person, place, and time.   Psychiatric:         Behavior: Behavior normal.

## 2024-12-24 ENCOUNTER — ANESTHESIA (OUTPATIENT)
Dept: ANESTHESIOLOGY | Facility: HOSPITAL | Age: 44
End: 2024-12-24

## 2024-12-24 ENCOUNTER — ANESTHESIA EVENT (OUTPATIENT)
Dept: ANESTHESIOLOGY | Facility: HOSPITAL | Age: 44
End: 2024-12-24

## 2025-01-02 DIAGNOSIS — Z86.0100 HISTORY OF COLON POLYPS: ICD-10-CM

## 2025-01-09 ENCOUNTER — ANESTHESIA (OUTPATIENT)
Dept: GASTROENTEROLOGY | Facility: AMBULARY SURGERY CENTER | Age: 45
End: 2025-01-09
Payer: COMMERCIAL

## 2025-01-09 ENCOUNTER — HOSPITAL ENCOUNTER (OUTPATIENT)
Dept: GASTROENTEROLOGY | Facility: AMBULARY SURGERY CENTER | Age: 45
Setting detail: OUTPATIENT SURGERY
End: 2025-01-09
Attending: INTERNAL MEDICINE
Payer: COMMERCIAL

## 2025-01-09 VITALS
OXYGEN SATURATION: 98 % | RESPIRATION RATE: 18 BRPM | BODY MASS INDEX: 20.83 KG/M2 | TEMPERATURE: 97.3 F | HEIGHT: 65 IN | HEART RATE: 73 BPM | SYSTOLIC BLOOD PRESSURE: 123 MMHG | DIASTOLIC BLOOD PRESSURE: 74 MMHG | WEIGHT: 125 LBS

## 2025-01-09 DIAGNOSIS — Z80.0 FAMILY HISTORY OF COLON CANCER: ICD-10-CM

## 2025-01-09 DIAGNOSIS — Z86.0100 HISTORY OF COLON POLYPS: ICD-10-CM

## 2025-01-09 PROCEDURE — 45385 COLONOSCOPY W/LESION REMOVAL: CPT | Performed by: INTERNAL MEDICINE

## 2025-01-09 PROCEDURE — 88305 TISSUE EXAM BY PATHOLOGIST: CPT | Performed by: STUDENT IN AN ORGANIZED HEALTH CARE EDUCATION/TRAINING PROGRAM

## 2025-01-09 PROCEDURE — 45380 COLONOSCOPY AND BIOPSY: CPT | Performed by: INTERNAL MEDICINE

## 2025-01-09 RX ORDER — PROPOFOL 10 MG/ML
INJECTION, EMULSION INTRAVENOUS AS NEEDED
Status: DISCONTINUED | OUTPATIENT
Start: 2025-01-09 | End: 2025-01-09

## 2025-01-09 RX ORDER — LIDOCAINE HYDROCHLORIDE 10 MG/ML
INJECTION, SOLUTION EPIDURAL; INFILTRATION; INTRACAUDAL; PERINEURAL AS NEEDED
Status: DISCONTINUED | OUTPATIENT
Start: 2025-01-09 | End: 2025-01-09

## 2025-01-09 RX ORDER — SODIUM CHLORIDE, SODIUM LACTATE, POTASSIUM CHLORIDE, CALCIUM CHLORIDE 600; 310; 30; 20 MG/100ML; MG/100ML; MG/100ML; MG/100ML
INJECTION, SOLUTION INTRAVENOUS CONTINUOUS PRN
Status: DISCONTINUED | OUTPATIENT
Start: 2025-01-09 | End: 2025-01-09

## 2025-01-09 RX ORDER — SODIUM CHLORIDE, SODIUM LACTATE, POTASSIUM CHLORIDE, CALCIUM CHLORIDE 600; 310; 30; 20 MG/100ML; MG/100ML; MG/100ML; MG/100ML
100 INJECTION, SOLUTION INTRAVENOUS CONTINUOUS
Status: CANCELLED | OUTPATIENT
Start: 2025-01-09

## 2025-01-09 RX ADMIN — PROPOFOL 40 MG: 10 INJECTION, EMULSION INTRAVENOUS at 09:58

## 2025-01-09 RX ADMIN — PROPOFOL 40 MG: 10 INJECTION, EMULSION INTRAVENOUS at 09:52

## 2025-01-09 RX ADMIN — SODIUM CHLORIDE, SODIUM LACTATE, POTASSIUM CHLORIDE, AND CALCIUM CHLORIDE: .6; .31; .03; .02 INJECTION, SOLUTION INTRAVENOUS at 09:39

## 2025-01-09 RX ADMIN — PROPOFOL 20 MG: 10 INJECTION, EMULSION INTRAVENOUS at 10:01

## 2025-01-09 RX ADMIN — PROPOFOL 20 MG: 10 INJECTION, EMULSION INTRAVENOUS at 10:04

## 2025-01-09 RX ADMIN — PROPOFOL 40 MG: 10 INJECTION, EMULSION INTRAVENOUS at 09:55

## 2025-01-09 RX ADMIN — PROPOFOL 100 MG: 10 INJECTION, EMULSION INTRAVENOUS at 09:48

## 2025-01-09 RX ADMIN — PROPOFOL 20 MG: 10 INJECTION, EMULSION INTRAVENOUS at 09:50

## 2025-01-09 RX ADMIN — LIDOCAINE HYDROCHLORIDE 50 MG: 10 INJECTION, SOLUTION EPIDURAL; INFILTRATION; INTRACAUDAL at 09:48

## 2025-01-09 NOTE — H&P
History and Physical - [facility]  Génesis Villagran 44 y.o. female MRN: [id number]    HPI: Génesis Villagran is a 44 y.o. year old female who presents for colon cancer screening evaluation.      Review of Systems    Historical Information   Past Medical History:   Diagnosis Date    Colon polyp     COVID-19     Diverticulosis     Hydronephrosis     Migraine     PONV (postoperative nausea and vomiting)     TMJ arthralgia     Resolved 10/29/2015     Ureteral stricture      Past Surgical History:   Procedure Laterality Date    APPENDECTOMY      CYSTOSCOPY W/ LASER LITHOTRIPSY Right 11/02/2017    Procedure: CYSTOSCOPY;  RIGHT URETEROSCOPY WITH  HOLMIUM LASER INCISION OF URETERAL STRICTURE; (WITH HYDROSTATIC RIGHT URETERAL DILATION), BILATERAL RETROGRADE PYELOGRAM AND INSERTION OF RIGHT URETERAL STENT X 2 (4.7 X 26);  Surgeon: Osei Bedoya MD;  [facility];  Service: Urology    DILATION AND CURETTAGE OF UTERUS N/A 12/30/2016    Procedure: DILATATION AND CURETTAGE;  Surgeon: Ankur Elder MD;  [facility];  Service:     DILATION AND EVACUATION      HYSTERECTOMY N/A 05/22/2017    Procedure: EXAM UNDER ANESTHESIA;  Surgeon: Ankur Elder MD;  [facility];  Service:     HYSTEROSCOPY N/A 12/30/2016    Procedure: HYSTEROSCOPY;  Surgeon: Ankur Elder MD;  [facility];  Service:     VA CYSTOURETHROSCOPY W/URETERAL CATHETERIZATION Bilateral 03/27/2017    Procedure: CYSTOSCOPY WITH RETROGRADE PYELOGRAM; RIGHT STENT INSERTION;  Surgeon: Osei Bedoya MD;  [facility];  Service: Urology    VA CYSTOURETHROSCOPY W/URETERAL CATHETERIZATION N/A 01/11/2018    Procedure: CYSTOSCOPY , BILATERAL RETROGRADE PYELOGRAM WITH RIGHT STENT EXTRACTION;  Surgeon: Osei Bedoya MD;  [facility];  Service: Urology    VA CYSTOURETHROSCOPY W/URETERAL CATHETERIZATION Right 01/15/2018    Procedure: CYSTOSCOPY, RIGHT RETROGRADE PYELOGRAM WITH INSERTION OF RIGHT STENT URETERAL;   Surgeon: Osei Bedoya MD;  [facility];  Service: Urology    MT LAPAROSCOPY W TOTAL HYSTERECTOMY UTERUS 250 GM/< N/A 2017    Procedure: TOTAL LAPAROSCOPIC HYSTERECTOMY . bilateral salpingectomy, cysto;  Surgeon: Ankur Elder MD;  [facility];  Service: Gynecology    REIMPLANT URETER IN BLADDER Right 2018    Procedure: URETERAL NEOCYSTOSTOMY, EXCISION OF URETERAL STRICTURE.;  Surgeon: Osei Bedoya MD;  [facility];  Service: Urology    URETERAL STENT PLACEMENT Right 2018    Procedure: INSERTION STENT URETERAL;  Surgeon: Osei Bedoya MD;  [facility];  Service: Urology    WISDOM TOOTH EXTRACTION      2 removed     Social History   Social History     Substance and Sexual Activity   Alcohol Use Not Currently    Alcohol/week: 0.0 - 3.0 standard drinks of alcohol     Social History     Substance and Sexual Activity   Drug Use No     Social History     Tobacco Use   Smoking Status Former    Current packs/day: 0.00    Average packs/day: 0.2 packs/day for 12.0 years (2.4 ttl pk-yrs)    Types: Cigarettes    Start date:     Quit date:     Years since quittin.0    Passive exposure: Past   Smokeless Tobacco Never   Tobacco Comments    quit 13 years ago     Family History   Problem Relation Age of Onset    Heart attack Mother         S/P triple Bypass    Hypertension Mother     Uterine cancer Mother     Diabetes Mother     Glaucoma Father     Hearing loss Father     Colon cancer Brother     No Known Problems Maternal Grandmother     No Known Problems Maternal Grandfather     No Known Problems Paternal Grandmother     Diabetes type II Paternal Grandfather     No Known Problems Daughter     No Known Problems Maternal Aunt     No Known Problems Maternal Aunt     Cancer Paternal Aunt         unsure of type    No Known Problems Son        Meds/Allergies     Not in a hospital admission.    Allergies   Allergen Reactions    Shellfish-Derived Products - Food  "Allergy Anaphylaxis     THROAT ITCHY MIGRAINES    Fentanyl Itching    Chandni-Fennel - Food Allergy Hives       Objective     /83   Pulse 69   Temp (!) 97.4 °F (36.3 °C) (Temporal)   Resp 18   Ht 5' 5\" (1.651 m)   Wt 56.7 kg (125 lb)   SpO2 98%   BMI 20.80 kg/m²       PHYSICAL EXAM    Gen: NAD  CV: RRR  CHEST: Clear  ABD: soft, NT/ND  EXT: no edema  Neuro: AAO      ASSESSMENT/PLAN:  This is a 44 y.o. year old female here for colon cancer screening evaluation, has history of polyps.    PLAN:   Procedure: Colonoscopy.      "

## 2025-01-09 NOTE — ANESTHESIA POSTPROCEDURE EVALUATION
Post-Op Assessment Note    CV Status:  Stable  Pain Score: 0    Pain management: adequate       Mental Status:  Awake and sleepy   Hydration Status:  Stable   PONV Controlled:  Controlled   Airway Patency:  Patent  Two or more mitigation strategies used for obstructive sleep apnea   Post Op Vitals Reviewed: Yes    No anethesia notable event occurred.    Staff: CRNA           Last Filed PACU Vitals:  Vitals Value Taken Time   Temp 97.3 °F (36.3 °C) 01/09/25 1010   Pulse 94 01/09/25 1010   /66 01/09/25 1010   Resp 18 01/09/25 1010   SpO2 98 % 01/09/25 1010       Modified Raji:     Vitals Value Taken Time   Activity 2 01/09/25 1010   Respiration 2 01/09/25 1010   Circulation 2 01/09/25 1010   Consciousness 1 01/09/25 1010   Oxygen Saturation 2 01/09/25 1010     Modified Raji Score: 9

## 2025-01-09 NOTE — ANESTHESIA PREPROCEDURE EVALUATION
Medical History    History Comments   PONV (postoperative nausea and vomiting)    Migraine    Ureteral stricture    Hydronephrosis    TMJ arthralgia Resolved 10/29/2015   Diverticulosis    COVID-19    Procedure:  COLONOSCOPY    Relevant Problems   ANESTHESIA   (+) PONV (postoperative nausea and vomiting)      CARDIO   (+) Atrial fibrillation with rapid ventricular response (HCC)   (+) Migraine   (+) Paroxysmal atrial fibrillation (HCC)      /RENAL   (+) Hydronephrosis      NEURO/PSYCH   (+) Chronic pain of left ankle   (+) MARLIN (generalized anxiety disorder)   (+) Migraine        Physical Exam    Airway    Mallampati score: II  TM Distance: >3 FB  Neck ROM: full     Dental       Cardiovascular  Rate: normal    Pulmonary  Pulmonary exam normal     Other Findings  Per pt denies anything remaining that is loose or removeable  post-pubertal.      Anesthesia Plan  ASA Score- 2     Anesthesia Type- IV sedation with anesthesia with ASA Monitors.         Additional Monitors:     Airway Plan:            Plan Factors-Exercise tolerance (METS): >4 METS.    Chart reviewed.    Patient summary reviewed.    Patient is not a current smoker.              Induction- intravenous.    Postoperative Plan-         Informed Consent- Anesthetic plan and risks discussed with patient.  I personally reviewed this patient with the CRNA. Discussed and agreed on the Anesthesia Plan with the CRNA..

## 2025-01-13 PROCEDURE — 88305 TISSUE EXAM BY PATHOLOGIST: CPT | Performed by: STUDENT IN AN ORGANIZED HEALTH CARE EDUCATION/TRAINING PROGRAM

## 2025-01-14 ENCOUNTER — RESULTS FOLLOW-UP (OUTPATIENT)
Dept: GASTROENTEROLOGY | Facility: CLINIC | Age: 45
End: 2025-01-14

## 2025-01-27 ENCOUNTER — OFFICE VISIT (OUTPATIENT)
Dept: FAMILY MEDICINE CLINIC | Facility: CLINIC | Age: 45
End: 2025-01-27
Payer: COMMERCIAL

## 2025-01-27 VITALS
OXYGEN SATURATION: 98 % | RESPIRATION RATE: 16 BRPM | SYSTOLIC BLOOD PRESSURE: 108 MMHG | BODY MASS INDEX: 21.56 KG/M2 | TEMPERATURE: 95.5 F | WEIGHT: 129.4 LBS | DIASTOLIC BLOOD PRESSURE: 76 MMHG | HEART RATE: 90 BPM | HEIGHT: 65 IN

## 2025-01-27 DIAGNOSIS — I48.0 PAROXYSMAL ATRIAL FIBRILLATION (HCC): ICD-10-CM

## 2025-01-27 DIAGNOSIS — L25.9 CONTACT DERMATITIS, UNSPECIFIED CONTACT DERMATITIS TYPE, UNSPECIFIED TRIGGER: Primary | ICD-10-CM

## 2025-01-27 DIAGNOSIS — L81.9 ATYPICAL PIGMENTED LESION: ICD-10-CM

## 2025-01-27 PROCEDURE — 99213 OFFICE O/P EST LOW 20 MIN: CPT | Performed by: FAMILY MEDICINE

## 2025-01-27 RX ORDER — CLOBETASOL PROPIONATE 0.5 MG/G
CREAM TOPICAL 2 TIMES DAILY
Qty: 60 G | Refills: 0 | Status: SHIPPED | OUTPATIENT
Start: 2025-01-27 | End: 2025-02-03

## 2025-01-27 RX ORDER — FAMOTIDINE 20 MG/1
40 TABLET, FILM COATED ORAL DAILY
Qty: 60 TABLET | Refills: 0 | Status: SHIPPED | OUTPATIENT
Start: 2025-01-27

## 2025-01-27 RX ORDER — LORATADINE 10 MG/1
10 TABLET ORAL DAILY
Start: 2025-01-27 | End: 2025-02-01

## 2025-01-27 NOTE — PROGRESS NOTES
"Name: Génesis Villagran      : 1980      MRN: [id number]  Encounter Provider: Missy Mendosa MD  Encounter Date: 2025   Encounter department: [facility]  :                Assessment & Plan  Contact dermatitis, unspecified contact dermatitis type, unspecified trigger  Suspect contact with an allergen. Recommend H1 and H2 blocker. Topical steroids to be applied up to 1 week for itching. Consider systemic steroid if the rash continues to spread  Orders:    famotidine (PEPCID) 20 mg tablet; Take 2 tablets (40 mg total) by mouth daily    loratadine (CLARITIN) 10 mg tablet; Take 1 tablet (10 mg total) by mouth daily for 5 days    clobetasol (TEMOVATE) 0.05 % cream; Apply topically 2 (two) times a day for 7 days    Paroxysmal atrial fibrillation (HCC)  Controlled without reoccurrence        Atypical pigmented lesion  2 mm papule with a hyperpigmented border. Pt states it has been present for years and unchanged               History of Present Illness   Presents with a rash that started 1 week ago   If first appeared on the left lower back and now throughout the back and the upper part of both arms.   Has been applying diaper cream and hydrocortisone with little relief. Pruritic. Denies starting a new medication. Did use a new detergent but is localized. No one else at home has a similar rash   Review of Systems   Skin:  Positive for rash.       Objective   /76 (BP Location: Left arm, Patient Position: Sitting, Cuff Size: Child)   Pulse 90   Temp (!) 95.5 °F (35.3 °C) (Tympanic)   Resp 16   Ht 5' 5\" (1.651 m)   Wt 58.7 kg (129 lb 6.4 oz)   SpO2 98%   BMI 21.53 kg/m²      Physical Exam  Vitals reviewed.   Constitutional:       General: She is not in acute distress.     Appearance: Normal appearance. She is not ill-appearing or toxic-appearing.   HENT:      Head: Normocephalic and atraumatic.   Pulmonary:      Effort: Pulmonary effort is normal.   Skin:     Findings: Rash " present. Rash is papular.             Comments: See photos    Neurological:      Mental Status: She is alert.

## 2025-01-28 ENCOUNTER — TELEPHONE (OUTPATIENT)
Age: 45
End: 2025-01-28

## 2025-01-28 DIAGNOSIS — T78.40XA ALLERGIC REACTION, INITIAL ENCOUNTER: ICD-10-CM

## 2025-01-28 RX ORDER — EPINEPHRINE 0.3 MG/.3ML
0.1 INJECTION SUBCUTANEOUS ONCE
Qty: 1 EACH | Refills: 1 | Status: SHIPPED | OUTPATIENT
Start: 2025-01-28 | End: 2025-01-28

## 2025-01-28 NOTE — TELEPHONE ENCOUNTER
PA EPINEPHRINE (EPIPEN) 0.3 mg/0.3 mL SUBMITTED     to [employer]     via    []CMM-KEY:    [x]Surescripts-Case ID # [id number]  []Availity-Auth ID #  NDC #    []Faxed to plan   []Other website    []Phone call Case ID #      []PA sent as URGENT    All office notes, labs and other pertaining documents and studies sent. Clinical questions answered. Awaiting determination from insurance company.     Turnaround time for your insurance to make a decision on your Prior Authorization can take 7-21 business days.

## 2025-03-21 DIAGNOSIS — H00.019 HORDEOLUM, UNSPECIFIED HORDEOLUM TYPE, UNSPECIFIED LATERALITY: ICD-10-CM

## 2025-03-21 DIAGNOSIS — H01.024 SQUAMOUS BLEPHARITIS OF LEFT UPPER EYELID: Primary | ICD-10-CM

## 2025-03-21 RX ORDER — OFLOXACIN 3 MG/ML
2 SOLUTION/ DROPS OPHTHALMIC 4 TIMES DAILY
Qty: 10 ML | Refills: 1 | Status: SHIPPED | OUTPATIENT
Start: 2025-03-21

## 2025-05-09 ENCOUNTER — OFFICE VISIT (OUTPATIENT)
Dept: FAMILY MEDICINE CLINIC | Facility: CLINIC | Age: 45
End: 2025-05-09
Payer: COMMERCIAL

## 2025-05-09 VITALS
RESPIRATION RATE: 18 BRPM | OXYGEN SATURATION: 97 % | HEART RATE: 88 BPM | BODY MASS INDEX: 20.83 KG/M2 | HEIGHT: 65 IN | WEIGHT: 125 LBS | SYSTOLIC BLOOD PRESSURE: 120 MMHG | TEMPERATURE: 97.5 F | DIASTOLIC BLOOD PRESSURE: 80 MMHG

## 2025-05-09 DIAGNOSIS — Z12.31 ENCOUNTER FOR SCREENING MAMMOGRAM FOR BREAST CANCER: ICD-10-CM

## 2025-05-09 DIAGNOSIS — Z13.6 SCREENING FOR CARDIOVASCULAR CONDITION: ICD-10-CM

## 2025-05-09 DIAGNOSIS — E55.9 VITAMIN D DEFICIENCY: ICD-10-CM

## 2025-05-09 DIAGNOSIS — R59.0 CERVICAL ADENOPATHY: ICD-10-CM

## 2025-05-09 DIAGNOSIS — F41.1 GAD (GENERALIZED ANXIETY DISORDER): ICD-10-CM

## 2025-05-09 DIAGNOSIS — Z00.00 ANNUAL PHYSICAL EXAM: Primary | ICD-10-CM

## 2025-05-09 PROCEDURE — 99396 PREV VISIT EST AGE 40-64: CPT | Performed by: FAMILY MEDICINE

## 2025-05-09 NOTE — PROGRESS NOTES
Adult Annual Physical  Name: Génesis Villagran      : 1980      MRN: [id number]  Encounter Provider: Missy Mendosa MD  Encounter Date: 2025   Encounter department: [facility]    :  Assessment & Plan  Annual physical exam  Doing well. Due for mammogram. UTD for pap. FBW ordered        Vitamin D deficiency  Historically low. Will check levels   Orders:    Vitamin D 25 hydroxy; Future    MARLIN (generalized anxiety disorder)  Controlled and no longer on prozac        Screening for cardiovascular condition    Orders:    Lipid panel; Future    Comprehensive metabolic panel; Future    Encounter for screening mammogram for breast cancer    Orders:    Mammo screening bilateral w 3d and cad; Future    Cervical adenopathy  Left posterior cervical and right anterior cervical adenopathy noted today   Asked to monitor   Getting CBC   Likely reactive           Preventive Screenings:    - Breast cancer screening: screening up-to-date     Immunizations:  - Immunizations due: Tdap         History of Present Illness     Adult Annual Physical:  Patient presents for annual physical.     Diet and Physical Activity:  - Diet/Nutrition: well balanced diet and portion control. gluten free  - Exercise: moderate cardiovascular exercise, 1-2 times a week on average, 3-4 times a week on average and less than 30 minutes on average. ride her bike around the [facility]    Depression Screening:  - PHQ-2 Score: 0    General Health:  - Sleep: sleeps well and 7-8 hours of sleep on average.  - Hearing: normal hearing bilateral ears.  - Vision: vision problems, wears glasses and most recent eye exam < 1 year ago.  - Dental: regular dental visits, brushes teeth once daily and floss regularly.    /GYN Health:  - Follows with GYN: no.   - Contraception: hysterectomy.      Advanced Care Planning:  - Has an advanced directive?: no    - Has a durable medical POA?: no      Review of Systems      Objective   /80 (BP  "Location: Left arm, Patient Position: Sitting, Cuff Size: Standard)   Pulse 88   Temp 97.5 °F (36.4 °C) (Temporal)   Resp 18   Ht 5' 5\" (1.651 m)   Wt 56.7 kg (125 lb)   SpO2 97%   BMI 20.80 kg/m²     Physical Exam  Vitals reviewed.   Constitutional:       General: She is not in acute distress.     Appearance: Normal appearance. She is not ill-appearing or toxic-appearing.   HENT:      Head: Normocephalic and atraumatic.      Right Ear: Tympanic membrane normal.      Left Ear: Tympanic membrane normal.      Nose: No congestion.      Mouth/Throat:      Mouth: Mucous membranes are moist.   Eyes:      Extraocular Movements: Extraocular movements intact.   Cardiovascular:      Rate and Rhythm: Normal rate and regular rhythm.      Heart sounds: No murmur heard.  Pulmonary:      Effort: Pulmonary effort is normal.      Breath sounds: Normal breath sounds.   Abdominal:      General: There is no distension.      Palpations: Abdomen is soft. There is no mass.      Tenderness: There is no abdominal tenderness. There is no guarding or rebound.      Hernia: No hernia is present.   Musculoskeletal:      Right lower leg: No edema.      Left lower leg: No edema.   Lymphadenopathy:      Cervical: Cervical adenopathy present.   Neurological:      Mental Status: She is alert and oriented to person, place, and time.   Psychiatric:         Mood and Affect: Mood normal.         Behavior: Behavior normal.         "

## 2025-05-10 NOTE — PATIENT INSTRUCTIONS
"Patient Education     Routine physical for adults   The Basics   Written by the doctors and editors at [facility]   What is a physical? -- A physical is a routine visit, or \"check-up,\" with your doctor. You might also hear it called a \"wellness visit\" or \"preventive visit.\"  During each visit, the doctor will:   Ask about your physical and mental health   Ask about your habits, behaviors, and lifestyle   Do an exam   Give you vaccines if needed   Talk to you about any medicines you take   Give advice about your health   Answer your questions  Getting regular check-ups is an important part of taking care of your health. It can help your doctor find and treat any problems you have. But it's also important for preventing health problems.  A routine physical is different from a \"sick visit.\" A sick visit is when you see a doctor because of a health concern or problem. Since physicals are scheduled ahead of time, you can think about what you want to ask the doctor.  How often should I get a physical? -- It depends on your age and health. In general, for people age 21 years and older:   If you are younger than 50 years, you might be able to get a physical every 3 years.   If you are 50 years or older, your doctor might recommend a physical every year.  If you have an ongoing health condition, like diabetes or high blood pressure, your doctor will probably want to see you more often.  What happens during a physical? -- In general, each visit will include:   Physical exam - The doctor or nurse will check your height, weight, heart rate, and blood pressure. They will also look at your eyes and ears. They will ask about how you are feeling and whether you have any symptoms that bother you.   Medicines - It's a good idea to bring a list of all the medicines you take to each doctor visit. Your doctor will talk to you about your medicines and answer any questions. Tell them if you are having any side effects that bother you. You " "should also tell them if you are having trouble paying for any of your medicines.   Habits and behaviors - This includes:   Your diet   Your exercise habits   Whether you smoke, drink alcohol, or use drugs   Whether you are sexually active   Whether you feel safe at home  Your doctor will talk to you about things you can do to improve your health and lower your risk of health problems. They will also offer help and support. For example, if you want to quit smoking, they can give you advice and might prescribe medicines. If you want to improve your diet or get more physical activity, they can help you with this, too.   Lab tests, if needed - The tests you get will depend on your age and situation. For example, your doctor might want to check your:   Cholesterol   Blood sugar   Iron level   Vaccines - The recommended vaccines will depend on your age, health, and what vaccines you already had. Vaccines are very important because they can prevent certain serious or deadly infections.   Discussion of screening - \"Screening\" means checking for diseases or other health problems before they cause symptoms. Your doctor can recommend screening based on your age, risk, and preferences. This might include tests to check for:   Cancer, such as breast, prostate, cervical, ovarian, colorectal, prostate, lung, or skin cancer   Sexually transmitted infections, such as chlamydia and gonorrhea   Mental health conditions like depression and anxiety  Your doctor will talk to you about the different types of screening tests. They can help you decide which screenings to have. They can also explain what the results might mean.   Answering questions - The physical is a good time to ask the doctor or nurse questions about your health. If needed, they can refer you to other doctors or specialists, too.  Adults older than 65 years often need other care, too. As you get older, your doctor will talk to you about:   How to prevent falling at " home   Hearing or vision tests   Memory testing   How to take your medicines safely   Making sure that you have the help and support you need at home  All topics are updated as new evidence becomes available and our peer review process is complete.  This topic retrieved from [employer] on: May 02, 2024.  Topic 523390 Version 1.0  Release: 32.4.3 - C32.122  © 2024 UpToDate, Inc. and/or its affiliates. All rights reserved.  Consumer Information Use and Disclaimer   Disclaimer: This generalized information is a limited summary of diagnosis, treatment, and/or medication information. It is not meant to be comprehensive and should be used as a tool to help the user understand and/or assess potential diagnostic and treatment options. It does NOT include all information about conditions, treatments, medications, side effects, or risks that may apply to a specific patient. It is not intended to be medical advice or a substitute for the medical advice, diagnosis, or treatment of a health care provider based on the health care provider's examination and assessment of a patient's specific and unique circumstances. Patients must speak with a health care provider for complete information about their health, medical questions, and treatment options, including any risks or benefits regarding use of medications. This information does not endorse any treatments or medications as safe, effective, or approved for treating a specific patient. Up[employer]Date, Inc. and its affiliates disclaim any warranty or liability relating to this information or the use thereof.The use of this information is governed by the Terms of Use, available at https://www.woltersCollegeHumoruwer.com/en/know/clinical-effectiveness-terms. 2024© UpToDate, Inc. and its affiliates and/or licensors. All rights reserved.  Copyright   © 2024 Up[employer]Date, Inc. and/or its affiliates. All rights reserved.

## (undated) DEVICE — DRESSING MEPILEX BORDER 4 X 8 IN

## (undated) DEVICE — DRAPE SHEET THREE QUARTER

## (undated) DEVICE — GUIDEWIRE ANGLED TIP 0.035 IN SOLO PLUS

## (undated) DEVICE — INFLATION DEVICE BASIX 30ATM

## (undated) DEVICE — GUIDEWIRE ANGLE TIP 0.038 IN SOLO PLUS

## (undated) DEVICE — AEM CORD

## (undated) DEVICE — SCD SEQUENTIAL COMPRESSION COMFORT SLEEVE MEDIUM KNEE LENGTH: Brand: KENDALL SCD

## (undated) DEVICE — CATH URETERAL 5FR X 70 CM FLEX TIP POLYUR BARD

## (undated) DEVICE — GAUZE SPONGES,16 PLY: Brand: CURITY

## (undated) DEVICE — 3M™ STERI-STRIP™ REINFORCED ADHESIVE SKIN CLOSURES, R1547, 1/2 IN X 4 IN (12 MM X 100 MM), 6 STRIPS/ENVELOPE: Brand: 3M™ STERI-STRIP™

## (undated) DEVICE — ENDOPATH XCEL BLADELESS TROCARS WITH STABILITY SLEEVES: Brand: ENDOPATH XCEL

## (undated) DEVICE — TRAY FOLEY 16FR URIMETER SURESTEP

## (undated) DEVICE — ENDOPATH XCEL UNIVERSAL TROCAR STABLILITY SLEEVES: Brand: ENDOPATH XCEL

## (undated) DEVICE — SUT MONOCRYL 2-0 SH 27 IN Y417H

## (undated) DEVICE — SUT PDS II 1 CTX 36 IN Z371T

## (undated) DEVICE — GLOVE SRG BIOGEL ECLIPSE 8

## (undated) DEVICE — SUT VICRYL 4-0 PS-2 18 IN J496G

## (undated) DEVICE — LIGACLIP MCA MULTIPLE CLIP APPLIERS, 20 LARGE CLIPS: Brand: LIGACLIP

## (undated) DEVICE — SPATULA SURG PKS PLASMA 33CM ANGLED

## (undated) DEVICE — ADHESIVE SKN CLSR HISTOACRYL FLEX 0.5ML LF

## (undated) DEVICE — ABDOMINAL PAD: Brand: DERMACEA

## (undated) DEVICE — SUT CHROMIC 0 CT 36 IN 914H

## (undated) DEVICE — SYRINGE 10ML LL

## (undated) DEVICE — CATH FOLEY 18FR 5ML 2 WAY SILICONE ELASTIMER

## (undated) DEVICE — PACK TUR

## (undated) DEVICE — NEEDLE 23 G X 1 SAFETY

## (undated) DEVICE — SPECIMEN CONTAINER STERILE PEEL PACK

## (undated) DEVICE — GLOVE INDICATOR PI UNDERGLOVE SZ 8 BLUE

## (undated) DEVICE — SUT VICRYL 2-0 SH 27 IN UNDYED J417H

## (undated) DEVICE — ALL PURPOSE SPONGES,NONWOVEN, 4 PLY: Brand: CURITY

## (undated) DEVICE — ENDOSCOPIC VALVE WITH ADAPTER.: Brand: SURSEAL® II

## (undated) DEVICE — SYRINGE 50ML LL

## (undated) DEVICE — VESSEL LOOPS X-RAY DETECTABLE: Brand: DEROYAL

## (undated) DEVICE — Device

## (undated) DEVICE — ENSEAL TISSUE SEALER G2 SUPER JAW CURVED FOR USE WITH GENERATOR G11 22CM SHAFT LENGTH: Brand: ENSEAL

## (undated) DEVICE — GLOVE SRG BIOGEL 6.5

## (undated) DEVICE — MAYO STAND COVER: Brand: CONVERTORS

## (undated) DEVICE — TUBING SUCTION 5MM X 12 FT

## (undated) DEVICE — JACKSON-PRATT 100CC BULB RESERVOIR: Brand: CARDINAL HEALTH

## (undated) DEVICE — GUIDEWIRE STRGHT TIP 0.035 IN  SOLO PLUS

## (undated) DEVICE — CATH BAL DIL 6FR 6MM 4CM PERC X-FRC U30 WO INFL DEV

## (undated) DEVICE — UTERINE MANIPULATOR RUMI 6.7 X 8 CM

## (undated) DEVICE — MAGNETIC INSTRUMENT PAD 16" X 20"; LARGE; DISPOSABLE: Brand: CARDINAL HEALTH

## (undated) DEVICE — SUT VICRYL 0 CT-1 27 IN J260H

## (undated) DEVICE — SUT ETHILON 3-0 PS-1 18 IN 1663G

## (undated) DEVICE — UNIVERSAL GYN LAPAROSCOPY,KIT: Brand: CARDINAL HEALTH

## (undated) DEVICE — STERILE CYSTO PACK: Brand: CARDINAL HEALTH

## (undated) DEVICE — GLOVE SRG BIOGEL 7

## (undated) DEVICE — GUIDEWIRE .038 X 145

## (undated) DEVICE — STERILE LATEX POWDER-FREE SURGICAL GLOVESWITH NITRILE COATING: Brand: PROTEXIS

## (undated) DEVICE — MEDI-VAC YANKAUER SUCTION HANDLE W/STRAIGHT TIP & CONTROL VENT: Brand: CARDINAL HEALTH

## (undated) DEVICE — SUT POLYESTER TAPE D-G 8618-00

## (undated) DEVICE — SURGICEL 4 X 8

## (undated) DEVICE — BULB SYRINGE,IRRIGATION WITH PROTECTIVE CAP: Brand: DOVER

## (undated) DEVICE — SYRINGE 20ML LL

## (undated) DEVICE — UNDYED BRAIDED (POLYGLACTIN 910), SYNTHETIC ABSORBABLE SUTURE: Brand: COATED VICRYL

## (undated) DEVICE — POOLE SUCTION HANDLE: Brand: CARDINAL HEALTH

## (undated) DEVICE — SUT VICRYL 0 REEL 54 IN J287G

## (undated) DEVICE — CYSTO TUBING SINGLE IRRIGATION

## (undated) DEVICE — STRAIGHT CATH 10FR

## (undated) DEVICE — SUT PDS II 0 CT-1 27 IN Z340H

## (undated) DEVICE — CATH URET .038 10FR 50CM DUAL LUMEN

## (undated) DEVICE — INTENDED FOR TISSUE SEPARATION, AND OTHER PROCEDURES THAT REQUIRE A SHARP SURGICAL BLADE TO PUNCTURE OR CUT.: Brand: BARD-PARKER SAFETY BLADES SIZE 11, STERILE

## (undated) DEVICE — URINE DRAINAGE BAG,BAG, NEEDLE SAMPLING, DRAIN TUBE: Brand: DOVER

## (undated) DEVICE — CYSTO TUBING TUR Y IRRIGATION

## (undated) DEVICE — 3000CC GUARDIAN II: Brand: GUARDIAN

## (undated) DEVICE — URIMETER 2500ML

## (undated) DEVICE — CHLORHEXIDINE 4PCT 4 OZ

## (undated) DEVICE — SUT MONOCRYL 4-0 RB-1 27 IN Y214H

## (undated) DEVICE — ARISTA AH ABSORBABLE HEMOSTATIC PARTICLES: Brand: ARISTA™ AH

## (undated) DEVICE — STERILE 8 INCH PROCTO SWAB: Brand: CARDINAL HEALTH

## (undated) DEVICE — 2000CC GUARDIAN II: Brand: GUARDIAN

## (undated) DEVICE — LIGACLIP MCA MULTIPLE CLIP APPLIERS, 20 MEDIUM CLIPS: Brand: LIGACLIP

## (undated) DEVICE — SPONGE PVP SCRUB WING STERILE

## (undated) DEVICE — SYRINGE CATH TIP 50ML

## (undated) DEVICE — ROSEBUD DISSECTORS: Brand: DEROYAL

## (undated) DEVICE — PLUMEPEN PRO 10FT

## (undated) DEVICE — JP PERF DRN SIL FLT 10MM FULL: Brand: CARDINAL HEALTH

## (undated) DEVICE — MEDI-VAC YANK SUCT HNDL W/TPRD BULBOUS TIP: Brand: CARDINAL HEALTH

## (undated) DEVICE — INTENDED FOR TISSUE SEPARATION, AND OTHER PROCEDURES THAT REQUIRE A SHARP SURGICAL BLADE TO PUNCTURE OR CUT.: Brand: BARD-PARKER SAFETY BLADES SIZE 10, STERILE

## (undated) DEVICE — MEDI-VAC NON-CONDUCTIVE SUCTION TUBING 6MM X 1.8M (6FT.) L: Brand: CARDINAL HEALTH

## (undated) DEVICE — CATH FOLEY 16FR 5ML 2WAY LUBRICATH

## (undated) DEVICE — IRRIG ENDO FLO TUBING

## (undated) DEVICE — STERILE MAJOR GENERAL PACK: Brand: CARDINAL HEALTH

## (undated) DEVICE — MEDI-VAC YANKAUER SUCTION HANDLE W/BULBOUS TIP: Brand: CARDINAL HEALTH

## (undated) DEVICE — PVC URETHRAL CATHETER: Brand: DOVER

## (undated) DEVICE — FORCEPS PK CUTTING 5MM 33CM

## (undated) DEVICE — POOLE SUCTION HANDLE W/TUBING: Brand: CARDINAL HEALTH

## (undated) DEVICE — SPONGE LAP 18 X 18 IN STRL RFD

## (undated) DEVICE — REM POLYHESIVE ADULT PATIENT RETURN ELECTRODE: Brand: VALLEYLAB

## (undated) DEVICE — INTENDED FOR TISSUE SEPARATION, AND OTHER PROCEDURES THAT REQUIRE A SHARP SURGICAL BLADE TO PUNCTURE OR CUT.: Brand: BARD-PARKER SAFETY BLADES SIZE 15, STERILE

## (undated) DEVICE — SUT MONOCRYL 4-0 RB-1 27 IN Y304H